# Patient Record
Sex: FEMALE | Race: WHITE | NOT HISPANIC OR LATINO | Employment: FULL TIME | URBAN - METROPOLITAN AREA
[De-identification: names, ages, dates, MRNs, and addresses within clinical notes are randomized per-mention and may not be internally consistent; named-entity substitution may affect disease eponyms.]

---

## 2017-06-01 ENCOUNTER — TRANSCRIBE ORDERS (OUTPATIENT)
Dept: MAMMOGRAPHY | Facility: HOSPITAL | Age: 47
End: 2017-06-01

## 2017-06-01 DIAGNOSIS — Z12.31 VISIT FOR SCREENING MAMMOGRAM: Primary | ICD-10-CM

## 2017-06-12 ENCOUNTER — HOSPITAL ENCOUNTER (OUTPATIENT)
Dept: MAMMOGRAPHY | Facility: HOSPITAL | Age: 47
Discharge: HOME | End: 2017-06-12
Attending: FAMILY MEDICINE | Admitting: FAMILY MEDICINE

## 2017-06-12 DIAGNOSIS — Z12.31 VISIT FOR SCREENING MAMMOGRAM: ICD-10-CM

## 2017-06-12 PROCEDURE — G0202 SCR MAMMO BI INCL CAD: HCPCS

## 2017-06-12 PROCEDURE — 77063 BREAST TOMOSYNTHESIS BI: CPT | Performed by: RADIOLOGY

## 2017-06-12 PROCEDURE — 77063 BREAST TOMOSYNTHESIS BI: CPT

## 2017-06-12 PROCEDURE — 77067 SCR MAMMO BI INCL CAD: CPT | Performed by: RADIOLOGY

## 2018-05-17 ENCOUNTER — TRANSCRIBE ORDERS (OUTPATIENT)
Dept: ADMINISTRATIVE | Facility: HOSPITAL | Age: 48
End: 2018-05-17

## 2018-05-17 ENCOUNTER — HOSPITAL ENCOUNTER (OUTPATIENT)
Dept: CT IMAGING | Facility: HOSPITAL | Age: 48
Discharge: HOME | End: 2018-05-17
Admitting: NURSE PRACTITIONER

## 2018-05-17 DIAGNOSIS — R10.30 LOWER ABDOMINAL PAIN: ICD-10-CM

## 2018-05-17 DIAGNOSIS — R10.30 LOWER ABDOMINAL PAIN: Primary | ICD-10-CM

## 2018-05-17 PROCEDURE — 74176 CT ABD & PELVIS W/O CONTRAST: CPT

## 2018-05-17 PROCEDURE — 0 DIATRIZOATE MEGLUMINE & SODIUM PER 1 ML: Performed by: NURSE PRACTITIONER

## 2018-05-17 RX ADMIN — DIATRIZOATE MEGLUMINE AND DIATRIZOATE SODIUM 15 ML: 660; 100 LIQUID ORAL; RECTAL at 11:50

## 2018-08-13 ENCOUNTER — TRANSCRIBE ORDERS (OUTPATIENT)
Dept: ADMINISTRATIVE | Facility: HOSPITAL | Age: 48
End: 2018-08-13

## 2018-08-13 DIAGNOSIS — Z12.31 VISIT FOR SCREENING MAMMOGRAM: Primary | ICD-10-CM

## 2018-08-14 ENCOUNTER — HOSPITAL ENCOUNTER (OUTPATIENT)
Dept: MAMMOGRAPHY | Facility: HOSPITAL | Age: 48
Discharge: HOME | End: 2018-08-14
Attending: FAMILY MEDICINE | Admitting: FAMILY MEDICINE

## 2018-08-14 DIAGNOSIS — Z12.31 VISIT FOR SCREENING MAMMOGRAM: ICD-10-CM

## 2018-08-14 PROCEDURE — 77063 BREAST TOMOSYNTHESIS BI: CPT | Performed by: RADIOLOGY

## 2018-08-14 PROCEDURE — 77063 BREAST TOMOSYNTHESIS BI: CPT

## 2018-08-14 PROCEDURE — 77067 SCR MAMMO BI INCL CAD: CPT

## 2018-08-14 PROCEDURE — 77067 SCR MAMMO BI INCL CAD: CPT | Performed by: RADIOLOGY

## 2019-05-31 ENCOUNTER — TRANSCRIBE ORDERS (OUTPATIENT)
Dept: ADMINISTRATIVE | Facility: HOSPITAL | Age: 49
End: 2019-05-31

## 2019-05-31 ENCOUNTER — HOSPITAL ENCOUNTER (OUTPATIENT)
Dept: CT IMAGING | Facility: HOSPITAL | Age: 49
Discharge: HOME | End: 2019-05-31
Admitting: NURSE PRACTITIONER

## 2019-05-31 DIAGNOSIS — R10.30 LOWER ABDOMINAL PAIN: ICD-10-CM

## 2019-05-31 DIAGNOSIS — R10.30 LOWER ABDOMINAL PAIN: Primary | ICD-10-CM

## 2019-05-31 PROCEDURE — 74176 CT ABD & PELVIS W/O CONTRAST: CPT

## 2019-12-16 ENCOUNTER — TRANSCRIBE ORDERS (OUTPATIENT)
Dept: ADMINISTRATIVE | Facility: HOSPITAL | Age: 49
End: 2019-12-16

## 2019-12-16 DIAGNOSIS — Z12.31 VISIT FOR SCREENING MAMMOGRAM: Primary | ICD-10-CM

## 2019-12-17 ENCOUNTER — HOSPITAL ENCOUNTER (OUTPATIENT)
Dept: MAMMOGRAPHY | Facility: HOSPITAL | Age: 49
Discharge: HOME | End: 2019-12-17
Admitting: FAMILY MEDICINE

## 2019-12-17 DIAGNOSIS — Z12.31 VISIT FOR SCREENING MAMMOGRAM: ICD-10-CM

## 2019-12-17 PROCEDURE — 77063 BREAST TOMOSYNTHESIS BI: CPT | Performed by: RADIOLOGY

## 2019-12-17 PROCEDURE — 77067 SCR MAMMO BI INCL CAD: CPT

## 2019-12-17 PROCEDURE — 77067 SCR MAMMO BI INCL CAD: CPT | Performed by: RADIOLOGY

## 2019-12-17 PROCEDURE — 77063 BREAST TOMOSYNTHESIS BI: CPT

## 2020-11-25 ENCOUNTER — TRANSCRIBE ORDERS (OUTPATIENT)
Dept: ADMINISTRATIVE | Facility: HOSPITAL | Age: 50
End: 2020-11-25

## 2020-11-25 ENCOUNTER — HOSPITAL ENCOUNTER (OUTPATIENT)
Dept: GENERAL RADIOLOGY | Facility: HOSPITAL | Age: 50
Discharge: HOME | End: 2020-11-25
Admitting: NURSE PRACTITIONER

## 2020-11-25 DIAGNOSIS — R10.12 LUQ PAIN: Primary | ICD-10-CM

## 2020-11-25 PROCEDURE — 74019 RADEX ABDOMEN 2 VIEWS: CPT

## 2021-01-19 ENCOUNTER — TRANSCRIBE ORDERS (OUTPATIENT)
Dept: ADMINISTRATIVE | Facility: HOSPITAL | Age: 51
End: 2021-01-19

## 2021-01-19 DIAGNOSIS — Z12.31 VISIT FOR SCREENING MAMMOGRAM: Primary | ICD-10-CM

## 2021-03-20 ENCOUNTER — APPOINTMENT (OUTPATIENT)
Dept: MAMMOGRAPHY | Facility: HOSPITAL | Age: 51
End: 2021-03-20

## 2021-05-01 ENCOUNTER — HOSPITAL ENCOUNTER (OUTPATIENT)
Dept: MAMMOGRAPHY | Facility: HOSPITAL | Age: 51
Discharge: HOME | End: 2021-05-01
Admitting: FAMILY MEDICINE

## 2021-05-01 DIAGNOSIS — Z12.31 VISIT FOR SCREENING MAMMOGRAM: ICD-10-CM

## 2021-05-01 PROCEDURE — 77067 SCR MAMMO BI INCL CAD: CPT

## 2021-05-01 PROCEDURE — 77063 BREAST TOMOSYNTHESIS BI: CPT

## 2021-05-01 PROCEDURE — 77067 SCR MAMMO BI INCL CAD: CPT | Performed by: RADIOLOGY

## 2021-05-01 PROCEDURE — 77063 BREAST TOMOSYNTHESIS BI: CPT | Performed by: RADIOLOGY

## 2021-08-10 ENCOUNTER — TRANSCRIBE ORDERS (OUTPATIENT)
Dept: GENERAL RADIOLOGY | Facility: HOSPITAL | Age: 51
End: 2021-08-10

## 2021-08-10 ENCOUNTER — HOSPITAL ENCOUNTER (OUTPATIENT)
Dept: GENERAL RADIOLOGY | Facility: HOSPITAL | Age: 51
Discharge: HOME | End: 2021-08-10
Admitting: PHYSICIAN ASSISTANT

## 2021-08-10 DIAGNOSIS — M54.50 LOW BACK PAIN, UNSPECIFIED BACK PAIN LATERALITY, UNSPECIFIED CHRONICITY, UNSPECIFIED WHETHER SCIATICA PRESENT: ICD-10-CM

## 2021-08-10 DIAGNOSIS — M54.50 LOW BACK PAIN, UNSPECIFIED BACK PAIN LATERALITY, UNSPECIFIED CHRONICITY, UNSPECIFIED WHETHER SCIATICA PRESENT: Primary | ICD-10-CM

## 2021-08-10 PROCEDURE — 72202 X-RAY EXAM SI JOINTS 3/> VWS: CPT

## 2021-09-17 ENCOUNTER — TRANSCRIBE ORDERS (OUTPATIENT)
Dept: ADMINISTRATIVE | Facility: HOSPITAL | Age: 51
End: 2021-09-17

## 2021-09-17 DIAGNOSIS — M15.0 PRIMARY GENERALIZED (OSTEO)ARTHRITIS: ICD-10-CM

## 2021-09-17 DIAGNOSIS — M06.09 RHEUMATOID ARTHRITIS OF MULTIPLE SITES WITHOUT RHEUMATOID FACTOR (HCC): ICD-10-CM

## 2021-09-17 DIAGNOSIS — M54.50 LOW BACK PAIN, UNSPECIFIED BACK PAIN LATERALITY, UNSPECIFIED CHRONICITY, UNSPECIFIED WHETHER SCIATICA PRESENT: Primary | ICD-10-CM

## 2022-03-11 ENCOUNTER — LAB (OUTPATIENT)
Dept: LAB | Facility: HOSPITAL | Age: 52
End: 2022-03-11

## 2022-03-11 ENCOUNTER — TRANSCRIBE ORDERS (OUTPATIENT)
Dept: LAB | Facility: HOSPITAL | Age: 52
End: 2022-03-11

## 2022-03-11 DIAGNOSIS — M05.79 SEROPOSITIVE RHEUMATOID ARTHRITIS OF MULTIPLE SITES: ICD-10-CM

## 2022-03-11 DIAGNOSIS — M05.79 SEROPOSITIVE RHEUMATOID ARTHRITIS OF MULTIPLE SITES: Primary | ICD-10-CM

## 2022-03-11 LAB
BASOPHILS # BLD AUTO: 0.04 10*3/MM3 (ref 0–0.2)
BASOPHILS NFR BLD AUTO: 0.5 % (ref 0–1.5)
DEPRECATED RDW RBC AUTO: 41.2 FL (ref 37–54)
EOSINOPHIL # BLD AUTO: 0.09 10*3/MM3 (ref 0–0.4)
EOSINOPHIL NFR BLD AUTO: 1.2 % (ref 0.3–6.2)
ERYTHROCYTE [DISTWIDTH] IN BLOOD BY AUTOMATED COUNT: 12.8 % (ref 12.3–15.4)
ERYTHROCYTE [SEDIMENTATION RATE] IN BLOOD: 15 MM/HR (ref 0–20)
HCT VFR BLD AUTO: 47.9 % (ref 34–46.6)
HGB BLD-MCNC: 16 G/DL (ref 12–15.9)
IMM GRANULOCYTES # BLD AUTO: 0.02 10*3/MM3 (ref 0–0.05)
IMM GRANULOCYTES NFR BLD AUTO: 0.3 % (ref 0–0.5)
LYMPHOCYTES # BLD AUTO: 2.05 10*3/MM3 (ref 0.7–3.1)
LYMPHOCYTES NFR BLD AUTO: 27.2 % (ref 19.6–45.3)
MCH RBC QN AUTO: 29.6 PG (ref 26.6–33)
MCHC RBC AUTO-ENTMCNC: 33.4 G/DL (ref 31.5–35.7)
MCV RBC AUTO: 88.5 FL (ref 79–97)
MONOCYTES # BLD AUTO: 0.48 10*3/MM3 (ref 0.1–0.9)
MONOCYTES NFR BLD AUTO: 6.4 % (ref 5–12)
NEUTROPHILS NFR BLD AUTO: 4.87 10*3/MM3 (ref 1.7–7)
NEUTROPHILS NFR BLD AUTO: 64.4 % (ref 42.7–76)
NRBC BLD AUTO-RTO: 0 /100 WBC (ref 0–0.2)
PLATELET # BLD AUTO: 302 10*3/MM3 (ref 140–450)
PMV BLD AUTO: 10.7 FL (ref 6–12)
RBC # BLD AUTO: 5.41 10*6/MM3 (ref 3.77–5.28)
WBC NRBC COR # BLD: 7.55 10*3/MM3 (ref 3.4–10.8)

## 2022-03-11 PROCEDURE — 80053 COMPREHEN METABOLIC PANEL: CPT

## 2022-03-11 PROCEDURE — 86480 TB TEST CELL IMMUN MEASURE: CPT

## 2022-03-11 PROCEDURE — 36415 COLL VENOUS BLD VENIPUNCTURE: CPT

## 2022-03-11 PROCEDURE — 85652 RBC SED RATE AUTOMATED: CPT

## 2022-03-11 PROCEDURE — 86140 C-REACTIVE PROTEIN: CPT

## 2022-03-11 PROCEDURE — 85025 COMPLETE CBC W/AUTO DIFF WBC: CPT

## 2022-03-12 LAB
ALBUMIN SERPL-MCNC: 4.5 G/DL (ref 3.5–5.2)
ALBUMIN/GLOB SERPL: 1.7 G/DL
ALP SERPL-CCNC: 77 U/L (ref 39–117)
ALT SERPL W P-5'-P-CCNC: 48 U/L (ref 1–33)
ANION GAP SERPL CALCULATED.3IONS-SCNC: 13.1 MMOL/L (ref 5–15)
AST SERPL-CCNC: 33 U/L (ref 1–32)
BILIRUB SERPL-MCNC: 0.3 MG/DL (ref 0–1.2)
BUN SERPL-MCNC: 11 MG/DL (ref 6–20)
BUN/CREAT SERPL: 12 (ref 7–25)
CALCIUM SPEC-SCNC: 9.2 MG/DL (ref 8.6–10.5)
CHLORIDE SERPL-SCNC: 97 MMOL/L (ref 98–107)
CO2 SERPL-SCNC: 28.9 MMOL/L (ref 22–29)
CREAT SERPL-MCNC: 0.92 MG/DL (ref 0.57–1)
CRP SERPL-MCNC: <0.3 MG/DL (ref 0–0.5)
EGFRCR SERPLBLD CKD-EPI 2021: 76.5 ML/MIN/1.73
GLOBULIN UR ELPH-MCNC: 2.7 GM/DL
GLUCOSE SERPL-MCNC: 92 MG/DL (ref 65–99)
POTASSIUM SERPL-SCNC: 3.2 MMOL/L (ref 3.5–5.2)
PROT SERPL-MCNC: 7.2 G/DL (ref 6–8.5)
SODIUM SERPL-SCNC: 139 MMOL/L (ref 136–145)

## 2022-03-15 LAB
GAMMA INTERFERON BACKGROUND BLD IA-ACNC: 0.19 IU/ML
M TB IFN-G BLD-IMP: NEGATIVE
M TB IFN-G CD4+ BCKGRND COR BLD-ACNC: 0.16 IU/ML
M TB IFN-G CD4+CD8+ BCKGRND COR BLD-ACNC: 0.29 IU/ML
MITOGEN IGNF BLD-ACNC: >10 IU/ML
SERVICE CMNT-IMP: NORMAL

## 2022-07-11 ENCOUNTER — TRANSCRIBE ORDERS (OUTPATIENT)
Dept: ADMINISTRATIVE | Facility: HOSPITAL | Age: 52
End: 2022-07-11

## 2022-07-11 DIAGNOSIS — Z12.31 VISIT FOR SCREENING MAMMOGRAM: Primary | ICD-10-CM

## 2022-07-19 ENCOUNTER — APPOINTMENT (OUTPATIENT)
Dept: MAMMOGRAPHY | Facility: HOSPITAL | Age: 52
End: 2022-07-19

## 2022-08-10 ENCOUNTER — HOSPITAL ENCOUNTER (OUTPATIENT)
Dept: MAMMOGRAPHY | Facility: HOSPITAL | Age: 52
Discharge: HOME | End: 2022-08-10
Admitting: FAMILY MEDICINE

## 2022-08-10 DIAGNOSIS — Z12.31 VISIT FOR SCREENING MAMMOGRAM: ICD-10-CM

## 2022-08-10 PROCEDURE — 77067 SCR MAMMO BI INCL CAD: CPT

## 2022-08-10 PROCEDURE — 77063 BREAST TOMOSYNTHESIS BI: CPT | Performed by: RADIOLOGY

## 2022-08-10 PROCEDURE — 77063 BREAST TOMOSYNTHESIS BI: CPT

## 2022-08-10 PROCEDURE — 77067 SCR MAMMO BI INCL CAD: CPT | Performed by: RADIOLOGY

## 2022-10-16 ENCOUNTER — HOSPITAL ENCOUNTER (EMERGENCY)
Facility: HOSPITAL | Age: 52
Discharge: HOME | End: 2022-10-16
Attending: EMERGENCY MEDICINE | Admitting: EMERGENCY MEDICINE

## 2022-10-16 ENCOUNTER — APPOINTMENT (OUTPATIENT)
Dept: CT IMAGING | Facility: HOSPITAL | Age: 52
End: 2022-10-16

## 2022-10-16 VITALS
HEIGHT: 66 IN | OXYGEN SATURATION: 98 % | WEIGHT: 198 LBS | RESPIRATION RATE: 18 BRPM | DIASTOLIC BLOOD PRESSURE: 85 MMHG | HEART RATE: 94 BPM | SYSTOLIC BLOOD PRESSURE: 123 MMHG | BODY MASS INDEX: 31.82 KG/M2 | TEMPERATURE: 97.8 F

## 2022-10-16 DIAGNOSIS — K57.92 ACUTE DIVERTICULITIS: Primary | ICD-10-CM

## 2022-10-16 LAB
ALBUMIN SERPL-MCNC: 4.3 G/DL (ref 3.5–5.2)
ALBUMIN/GLOB SERPL: 1.5 G/DL
ALP SERPL-CCNC: 86 U/L (ref 39–117)
ALT SERPL W P-5'-P-CCNC: 57 U/L (ref 1–33)
ANION GAP SERPL CALCULATED.3IONS-SCNC: 11 MMOL/L (ref 5–15)
AST SERPL-CCNC: 33 U/L (ref 1–32)
BASOPHILS # BLD AUTO: 0.02 10*3/MM3 (ref 0–0.2)
BASOPHILS NFR BLD AUTO: 0.2 % (ref 0–1.5)
BILIRUB SERPL-MCNC: 0.7 MG/DL (ref 0–1.2)
BILIRUB UR QL STRIP: NEGATIVE
BUN SERPL-MCNC: 13 MG/DL (ref 6–20)
BUN/CREAT SERPL: 16.9 (ref 7–25)
CALCIUM SPEC-SCNC: 9.3 MG/DL (ref 8.6–10.5)
CHLORIDE SERPL-SCNC: 101 MMOL/L (ref 98–107)
CLARITY UR: CLEAR
CO2 SERPL-SCNC: 27 MMOL/L (ref 22–29)
COLOR UR: YELLOW
CREAT SERPL-MCNC: 0.77 MG/DL (ref 0.57–1)
D-LACTATE SERPL-SCNC: 1.4 MMOL/L (ref 0.5–2)
DEPRECATED RDW RBC AUTO: 43 FL (ref 37–54)
EGFRCR SERPLBLD CKD-EPI 2021: 94.1 ML/MIN/1.73
EOSINOPHIL # BLD AUTO: 0.11 10*3/MM3 (ref 0–0.4)
EOSINOPHIL NFR BLD AUTO: 1.2 % (ref 0.3–6.2)
ERYTHROCYTE [DISTWIDTH] IN BLOOD BY AUTOMATED COUNT: 12.6 % (ref 12.3–15.4)
GLOBULIN UR ELPH-MCNC: 2.8 GM/DL
GLUCOSE SERPL-MCNC: 123 MG/DL (ref 65–99)
GLUCOSE UR STRIP-MCNC: NEGATIVE MG/DL
HCT VFR BLD AUTO: 47.9 % (ref 34–46.6)
HGB BLD-MCNC: 15.8 G/DL (ref 12–15.9)
HGB UR QL STRIP.AUTO: NEGATIVE
HOLD SPECIMEN: NORMAL
IMM GRANULOCYTES # BLD AUTO: 0.02 10*3/MM3 (ref 0–0.05)
IMM GRANULOCYTES NFR BLD AUTO: 0.2 % (ref 0–0.5)
KETONES UR QL STRIP: NEGATIVE
LEUKOCYTE ESTERASE UR QL STRIP.AUTO: NEGATIVE
LIPASE SERPL-CCNC: 16 U/L (ref 13–60)
LYMPHOCYTES # BLD AUTO: 1.72 10*3/MM3 (ref 0.7–3.1)
LYMPHOCYTES NFR BLD AUTO: 19.4 % (ref 19.6–45.3)
MCH RBC QN AUTO: 30.4 PG (ref 26.6–33)
MCHC RBC AUTO-ENTMCNC: 33 G/DL (ref 31.5–35.7)
MCV RBC AUTO: 92.1 FL (ref 79–97)
MONOCYTES # BLD AUTO: 0.73 10*3/MM3 (ref 0.1–0.9)
MONOCYTES NFR BLD AUTO: 8.2 % (ref 5–12)
NEUTROPHILS NFR BLD AUTO: 6.28 10*3/MM3 (ref 1.7–7)
NEUTROPHILS NFR BLD AUTO: 70.8 % (ref 42.7–76)
NITRITE UR QL STRIP: NEGATIVE
NRBC BLD AUTO-RTO: 0 /100 WBC (ref 0–0.2)
PH UR STRIP.AUTO: 6 [PH] (ref 5–8)
PLATELET # BLD AUTO: 279 10*3/MM3 (ref 140–450)
PMV BLD AUTO: 10 FL (ref 6–12)
POTASSIUM SERPL-SCNC: 3.7 MMOL/L (ref 3.5–5.2)
PROT SERPL-MCNC: 7.1 G/DL (ref 6–8.5)
PROT UR QL STRIP: NEGATIVE
RBC # BLD AUTO: 5.2 10*6/MM3 (ref 3.77–5.28)
SODIUM SERPL-SCNC: 139 MMOL/L (ref 136–145)
SP GR UR STRIP: 1.01 (ref 1–1.03)
UROBILINOGEN UR QL STRIP: NORMAL
WBC NRBC COR # BLD: 8.88 10*3/MM3 (ref 3.4–10.8)
WHOLE BLOOD HOLD COAG: NORMAL
WHOLE BLOOD HOLD SPECIMEN: NORMAL

## 2022-10-16 PROCEDURE — 25010000002 ONDANSETRON PER 1 MG: Performed by: EMERGENCY MEDICINE

## 2022-10-16 PROCEDURE — 25010000002 IOPAMIDOL 61 % SOLUTION: Performed by: EMERGENCY MEDICINE

## 2022-10-16 PROCEDURE — 25010000002 LEVOFLOXACIN PER 250 MG: Performed by: NURSE PRACTITIONER

## 2022-10-16 PROCEDURE — 96365 THER/PROPH/DIAG IV INF INIT: CPT

## 2022-10-16 PROCEDURE — 96376 TX/PRO/DX INJ SAME DRUG ADON: CPT

## 2022-10-16 PROCEDURE — 74177 CT ABD & PELVIS W/CONTRAST: CPT

## 2022-10-16 PROCEDURE — 81003 URINALYSIS AUTO W/O SCOPE: CPT

## 2022-10-16 PROCEDURE — 83690 ASSAY OF LIPASE: CPT

## 2022-10-16 PROCEDURE — 85025 COMPLETE CBC W/AUTO DIFF WBC: CPT

## 2022-10-16 PROCEDURE — 96375 TX/PRO/DX INJ NEW DRUG ADDON: CPT

## 2022-10-16 PROCEDURE — 99284 EMERGENCY DEPT VISIT MOD MDM: CPT

## 2022-10-16 PROCEDURE — 36415 COLL VENOUS BLD VENIPUNCTURE: CPT

## 2022-10-16 PROCEDURE — 83605 ASSAY OF LACTIC ACID: CPT

## 2022-10-16 PROCEDURE — 25010000002 HYDROMORPHONE PER 4 MG: Performed by: EMERGENCY MEDICINE

## 2022-10-16 PROCEDURE — 80053 COMPREHEN METABOLIC PANEL: CPT

## 2022-10-16 RX ORDER — LEVOFLOXACIN 5 MG/ML
750 INJECTION, SOLUTION INTRAVENOUS ONCE
Status: COMPLETED | OUTPATIENT
Start: 2022-10-16 | End: 2022-10-16

## 2022-10-16 RX ORDER — ONDANSETRON 2 MG/ML
4 INJECTION INTRAMUSCULAR; INTRAVENOUS ONCE
Status: COMPLETED | OUTPATIENT
Start: 2022-10-16 | End: 2022-10-16

## 2022-10-16 RX ORDER — HYDROMORPHONE HYDROCHLORIDE 1 MG/ML
0.5 INJECTION, SOLUTION INTRAMUSCULAR; INTRAVENOUS; SUBCUTANEOUS ONCE
Status: COMPLETED | OUTPATIENT
Start: 2022-10-16 | End: 2022-10-16

## 2022-10-16 RX ORDER — METRONIDAZOLE 500 MG/1
500 TABLET ORAL 2 TIMES DAILY
Qty: 14 TABLET | Refills: 0 | Status: ON HOLD | OUTPATIENT
Start: 2022-10-16 | End: 2023-02-17

## 2022-10-16 RX ORDER — SODIUM CHLORIDE 0.9 % (FLUSH) 0.9 %
10 SYRINGE (ML) INJECTION AS NEEDED
Status: DISCONTINUED | OUTPATIENT
Start: 2022-10-16 | End: 2022-10-16 | Stop reason: HOSPADM

## 2022-10-16 RX ORDER — LEVOFLOXACIN 750 MG/1
750 TABLET ORAL DAILY
Qty: 9 TABLET | Refills: 0 | Status: ON HOLD | OUTPATIENT
Start: 2022-10-16 | End: 2023-02-17

## 2022-10-16 RX ORDER — ONDANSETRON 4 MG/1
4 TABLET, ORALLY DISINTEGRATING ORAL EVERY 6 HOURS PRN
Qty: 20 TABLET | Refills: 0 | Status: ON HOLD | OUTPATIENT
Start: 2022-10-16 | End: 2023-04-03 | Stop reason: SDUPTHER

## 2022-10-16 RX ORDER — SODIUM CHLORIDE 9 MG/ML
10 INJECTION INTRAVENOUS AS NEEDED
Status: DISCONTINUED | OUTPATIENT
Start: 2022-10-16 | End: 2022-10-16 | Stop reason: HOSPADM

## 2022-10-16 RX ADMIN — IOPAMIDOL 95 ML: 612 INJECTION, SOLUTION INTRAVENOUS at 15:29

## 2022-10-16 RX ADMIN — SODIUM CHLORIDE 1000 ML: 9 INJECTION, SOLUTION INTRAVENOUS at 15:08

## 2022-10-16 RX ADMIN — LEVOFLOXACIN 750 MG: 750 INJECTION, SOLUTION INTRAVENOUS at 16:37

## 2022-10-16 RX ADMIN — ONDANSETRON 4 MG: 2 INJECTION INTRAMUSCULAR; INTRAVENOUS at 16:37

## 2022-10-16 RX ADMIN — HYDROMORPHONE HYDROCHLORIDE 0.5 MG: 1 INJECTION, SOLUTION INTRAMUSCULAR; INTRAVENOUS; SUBCUTANEOUS at 16:37

## 2022-10-16 RX ADMIN — HYDROMORPHONE HYDROCHLORIDE 0.5 MG: 1 INJECTION, SOLUTION INTRAMUSCULAR; INTRAVENOUS; SUBCUTANEOUS at 15:08

## 2022-10-16 RX ADMIN — ONDANSETRON 4 MG: 2 INJECTION INTRAMUSCULAR; INTRAVENOUS at 15:08

## 2022-10-16 NOTE — DISCHARGE INSTRUCTIONS
Home to rest.  Maintain your very best hydration and nutrition.  Your next dose of Levaquin is due tomorrow and once daily for 9 days.  Begin taking the Flagyl twice daily this evening.  Do not drink any alcohol while consuming Flagyl.  Ibuprofen for discomfort.  Return to the emergency department as needed for worsening symptoms or concerns including but not limited to: Fever, intractable pain, intractable vomiting.  Thank you

## 2022-10-16 NOTE — ED PROVIDER NOTES
EMERGENCY DEPARTMENT ENCOUNTER    Pt Name: Heidy Whitman  MRN: [id number]  Pt :   1970  Room Number:    Date of encounter:  10/16/2022  PCP: Itzel Long MD  ED Provider: RORO Bowles    Historian: Patient      HPI:  Chief Complaint: Abdominal pain        Context: Heidy Whitman is a 52 y.o. female who presents to the ED c/o abdominal pain in the left lower quadrant that she recognizes is similar presentation in intensity to previous occasions of diverticulitis.  She rates her pain as 8 out of 10.  It has been hurting for approximately 3 days now.  She originally thought she may be constipated but after use of laxatives and a series of bowel movements yesterday, her pain has not relieved but only worsened.  She has had no blood from the rectum.    Review of system is negative for fever positive for chills.  Negative for chest pain or cough or shortness of breath.  Positive for nausea without vomiting.  She denies any diarrhea.  Positive for some constipation no blood from the rectum.  Positive for generalized weakness without dizziness or syncope.  No neurosensory complaint or focal weakness      PAST MEDICAL HISTORY  Past Medical History:   Diagnosis Date   • Arthritis    • Breast injury     2022-right breast hit with car door   • Disease of thyroid gland    • Diverticulitis    • Edema    • Hyperlipidemia    • Hypertension    • Rheumatoid arthritis (HCC)          PAST SURGICAL HISTORY  Past Surgical History:   Procedure Laterality Date   • ABDOMINAL SURGERY     • HYSTERECTOMY      age 30   • OOPHORECTOMY     • THYROID SURGERY           FAMILY HISTORY  Family History   Problem Relation Age of Onset   • Breast cancer Paternal Grandmother         age unknown   • Ovarian cancer Neg Hx          SOCIAL HISTORY  Social History     Socioeconomic History   • Marital status:    Tobacco Use   • Smoking status: Never   Substance and Sexual Activity   • Alcohol use: Yes     Comment:  occasional use   • Drug use: No   • Sexual activity: Defer         ALLERGIES  Guaifenesin & derivatives, Gabapentin, and Pregabalin        REVIEW OF SYSTEMS  Review of Systems     All systems reviewed and negative except for those discussed in HPI.       PHYSICAL EXAM    I have reviewed the triage vital signs and nursing notes.    ED Triage Vitals [10/16/22 1159]   Temp Heart Rate Resp BP SpO2   97.8 °F (36.6 °C) 98 18 107/81 98 %      Temp src Heart Rate Source Patient Position BP Location FiO2 (%)   Oral Monitor Sitting Left arm --       Physical Exam  GENERAL:   Appears uncomfortable.  She is slightly tachycardic.  She is a good historian  HENT: Nares patent.  EYES: No scleral icterus.  CV: Regular rhythm, regular rate.  Heart rate in the 90s.  RESPIRATORY: Normal effort.  No audible wheezes, rales or rhonchi.  ABDOMEN: Soft, localizes pain to the left lower quadrant moderate without guarding.  No CVA tenderness  MUSCULOSKELETAL: No deformities.   NEURO: Alert, moves all extremities, follows commands.  SKIN: Warm, dry, no rash visualized.        LAB RESULTS  No results found for this or any previous visit (from the past 24 hour(s)).    If labs were ordered, I independently reviewed the results.        RADIOLOGY  No Radiology Exams Resulted Within Past 24 Hours      PROCEDURES    Procedures    No orders to display       MEDICATIONS GIVEN IN ER    Medications   sodium chloride 0.9 % bolus 1,000 mL (0 mL Intravenous Stopped 10/16/22 1606)   HYDROmorphone (DILAUDID) injection 0.5 mg (0.5 mg Intravenous Given 10/16/22 1508)   ondansetron (ZOFRAN) injection 4 mg (4 mg Intravenous Given 10/16/22 1508)   iopamidol (ISOVUE-300) 61 % injection 95 mL (95 mL Intravenous Given 10/16/22 1529)   levoFLOXacin (LEVAQUIN) 750 mg/150 mL D5W (premix) (LEVAQUIN) 750 mg (0 mg Intravenous Stopped 10/16/22 1825)   ondansetron (ZOFRAN) injection 4 mg (4 mg Intravenous Given 10/16/22 1637)   HYDROmorphone (DILAUDID) injection 0.5 mg (0.5  mg Intravenous Given 10/16/22 1637)         ED Course as of 10/21/22 2000   Sun Oct 16, 2022   1459 WBC: 8.88 [MS]   1624 Patient's CT imaging is remarkable for acute diverticulitis.  Meanwhile, she has a normal white count and normal lactic acid.  She has no hypotension or tachycardia.  She has been made comfortable.  She is sure she is able to take and tolerate fluids.  She historically cannot tolerate oral pain medications due to profound nausea.  She is confident that she can manage her pain at home with ibuprofen and the antibiotics.  In her experience, initiation of antibiotics almost immediately starts to relieve her associated discomfort.  We discussed in detail parameters for concern that would warrant return to the emergency department.  Ms. Whitman understands and concurs with this outpatient plan and close follow-up [MS]      ED Course User Index  [MS] Sharonda PulliamRORO           AS OF 20:00 EDT VITALS:    BP - 123/85  HR - 94  TEMP - 97.8 °F (36.6 °C) (Oral)  O2 SATS - 98%                  DIAGNOSIS  Final diagnoses:   Acute diverticulitis         DISPOSITION    DISCHARGE    Patient discharged in stable condition.    Reviewed implications of results, diagnosis, meds, responsibility to follow up, warning signs and symptoms of possible worsening, potential complications and reasons to return to ER.    Patient/Family voiced understanding of above instructions.    Discussed plan for discharge, as there is no emergent indication for admission.  Pt/family is agreeable and understands need for follow up and possible repeat testing.  Pt/family is aware that discharge does not mean that nothing is wrong but that it indicates no emergency is currently present that requires admission and they must continue care with follow-up as given below or with a physician of their choice.     FOLLOW-UP  Amaury Little MD  [address] [place] [address] [phone]               Medication List      New  Prescriptions    levoFLOXacin 750 MG tablet  Commonly known as: LEVAQUIN  Take 1 tablet by mouth Daily.     metroNIDAZOLE 500 MG tablet  Commonly known as: FLAGYL  Take 1 tablet by mouth 2 (Two) Times a Day.     ondansetron ODT 4 MG disintegrating tablet  Commonly known as: ZOFRAN-ODT  Place 1 tablet on the tongue Every 6 (Six) Hours As Needed for Nausea.           Where to Get Your Medications      These medications were sent to [facility] [address] [place] [address] [address] [place] [address] [phone]  - [phone] [place] [address]    Phone: [phone]   · levoFLOXacin 750 MG tablet  · metroNIDAZOLE 500 MG tablet  · ondansetron ODT 4 MG disintegrating tablet                  Sharonda Pulliam, RORO  10/21/22 2000

## 2023-02-06 ENCOUNTER — TRANSCRIBE ORDERS (OUTPATIENT)
Dept: ADMINISTRATIVE | Facility: HOSPITAL | Age: 53
End: 2023-02-06
Payer: COMMERCIAL

## 2023-02-06 DIAGNOSIS — R94.31 NONSPECIFIC ABNORMAL ELECTROCARDIOGRAM (ECG) (EKG): Primary | ICD-10-CM

## 2023-02-06 DIAGNOSIS — R10.30 LOWER ABDOMINAL PAIN: Primary | ICD-10-CM

## 2023-02-16 ENCOUNTER — APPOINTMENT (OUTPATIENT)
Dept: CT IMAGING | Facility: HOSPITAL | Age: 53
End: 2023-02-16
Payer: COMMERCIAL

## 2023-02-16 ENCOUNTER — HOSPITAL ENCOUNTER (OUTPATIENT)
Facility: HOSPITAL | Age: 53
Setting detail: OBSERVATION
Discharge: HOME | End: 2023-02-21
Attending: EMERGENCY MEDICINE | Admitting: EMERGENCY MEDICINE
Payer: COMMERCIAL

## 2023-02-16 ENCOUNTER — APPOINTMENT (OUTPATIENT)
Dept: GENERAL RADIOLOGY | Facility: HOSPITAL | Age: 53
End: 2023-02-16
Payer: COMMERCIAL

## 2023-02-16 DIAGNOSIS — K57.92 ACUTE DIVERTICULITIS: Primary | ICD-10-CM

## 2023-02-16 DIAGNOSIS — M06.9 RHEUMATOID ARTHRITIS INVOLVING MULTIPLE SITES, UNSPECIFIED WHETHER RHEUMATOID FACTOR PRESENT: ICD-10-CM

## 2023-02-16 DIAGNOSIS — K57.92 DIVERTICULITIS: ICD-10-CM

## 2023-02-16 PROBLEM — E03.9 HYPOTHYROIDISM (ACQUIRED): Status: ACTIVE | Noted: 2023-02-16

## 2023-02-16 PROBLEM — Z86.19 HISTORY OF SHINGLES: Status: ACTIVE | Noted: 2017-03-23

## 2023-02-16 PROBLEM — I10 HTN (HYPERTENSION): Status: ACTIVE | Noted: 2023-02-16

## 2023-02-16 PROBLEM — Z79.52 CURRENT USE OF STEROID MEDICATION: Status: ACTIVE | Noted: 2023-02-16

## 2023-02-16 PROBLEM — R06.09 DOE (DYSPNEA ON EXERTION): Status: ACTIVE | Noted: 2023-02-16

## 2023-02-16 PROBLEM — R07.89 OTHER CHEST PAIN: Status: ACTIVE | Noted: 2023-02-16

## 2023-02-16 PROBLEM — K57.90 DIVERTICULOSIS: Status: ACTIVE | Noted: 2018-05-01

## 2023-02-16 PROBLEM — E78.5 HLD (HYPERLIPIDEMIA): Status: ACTIVE | Noted: 2023-02-16

## 2023-02-16 LAB
ALBUMIN SERPL-MCNC: 4.5 G/DL (ref 3.5–5.2)
ALBUMIN/GLOB SERPL: 1.6 G/DL
ALP SERPL-CCNC: 101 U/L (ref 39–117)
ALT SERPL W P-5'-P-CCNC: 68 U/L (ref 1–33)
ANION GAP SERPL CALCULATED.3IONS-SCNC: 12 MMOL/L (ref 5–15)
AST SERPL-CCNC: 52 U/L (ref 1–32)
BACTERIA UR QL AUTO: ABNORMAL /HPF
BASOPHILS # BLD AUTO: 0.03 10*3/MM3 (ref 0–0.2)
BASOPHILS NFR BLD AUTO: 0.4 % (ref 0–1.5)
BILIRUB SERPL-MCNC: 0.5 MG/DL (ref 0–1.2)
BILIRUB UR QL STRIP: NEGATIVE
BUN SERPL-MCNC: 13 MG/DL (ref 6–20)
BUN/CREAT SERPL: 14.3 (ref 7–25)
CALCIUM SPEC-SCNC: 9.2 MG/DL (ref 8.6–10.5)
CHLORIDE SERPL-SCNC: 102 MMOL/L (ref 98–107)
CLARITY UR: CLEAR
CO2 SERPL-SCNC: 27 MMOL/L (ref 22–29)
COLOR UR: YELLOW
CREAT BLDA-MCNC: 0.9 MG/DL
CREAT BLDA-MCNC: 0.9 MG/DL (ref 0.6–1.3)
CREAT SERPL-MCNC: 0.91 MG/DL (ref 0.57–1)
CRP SERPL-MCNC: 1.4 MG/DL (ref 0–0.5)
D-LACTATE SERPL-SCNC: 1.2 MMOL/L (ref 0.5–2)
DEPRECATED RDW RBC AUTO: 42.9 FL (ref 37–54)
EGFRCR SERPLBLD CKD-EPI 2021: 77 ML/MIN/1.73
EOSINOPHIL # BLD AUTO: 0.06 10*3/MM3 (ref 0–0.4)
EOSINOPHIL NFR BLD AUTO: 0.7 % (ref 0.3–6.2)
ERYTHROCYTE [DISTWIDTH] IN BLOOD BY AUTOMATED COUNT: 13.1 % (ref 12.3–15.4)
ERYTHROCYTE [SEDIMENTATION RATE] IN BLOOD: 36 MM/HR (ref 0–30)
GEN 5 2HR TROPONIN T REFLEX: 7 NG/L
GLOBULIN UR ELPH-MCNC: 2.8 GM/DL
GLUCOSE SERPL-MCNC: 106 MG/DL (ref 65–99)
GLUCOSE UR STRIP-MCNC: NEGATIVE MG/DL
HCT VFR BLD AUTO: 46.4 % (ref 34–46.6)
HGB BLD-MCNC: 15.4 G/DL (ref 12–15.9)
HGB UR QL STRIP.AUTO: NEGATIVE
HOLD SPECIMEN: NORMAL
HYALINE CASTS UR QL AUTO: ABNORMAL /LPF
IMM GRANULOCYTES # BLD AUTO: 0.03 10*3/MM3 (ref 0–0.05)
IMM GRANULOCYTES NFR BLD AUTO: 0.4 % (ref 0–0.5)
KETONES UR QL STRIP: ABNORMAL
LEUKOCYTE ESTERASE UR QL STRIP.AUTO: ABNORMAL
LIPASE SERPL-CCNC: 24 U/L (ref 13–60)
LYMPHOCYTES # BLD AUTO: 2.28 10*3/MM3 (ref 0.7–3.1)
LYMPHOCYTES NFR BLD AUTO: 27.7 % (ref 19.6–45.3)
MCH RBC QN AUTO: 29.9 PG (ref 26.6–33)
MCHC RBC AUTO-ENTMCNC: 33.2 G/DL (ref 31.5–35.7)
MCV RBC AUTO: 90.1 FL (ref 79–97)
MONOCYTES # BLD AUTO: 0.92 10*3/MM3 (ref 0.1–0.9)
MONOCYTES NFR BLD AUTO: 11.2 % (ref 5–12)
NEUTROPHILS NFR BLD AUTO: 4.9 10*3/MM3 (ref 1.7–7)
NEUTROPHILS NFR BLD AUTO: 59.6 % (ref 42.7–76)
NITRITE UR QL STRIP: NEGATIVE
NRBC BLD AUTO-RTO: 0 /100 WBC (ref 0–0.2)
NT-PROBNP SERPL-MCNC: 34.6 PG/ML (ref 0–900)
PH UR STRIP.AUTO: 8 [PH] (ref 5–8)
PLATELET # BLD AUTO: 306 10*3/MM3 (ref 140–450)
PMV BLD AUTO: 10.2 FL (ref 6–12)
POTASSIUM SERPL-SCNC: 4.1 MMOL/L (ref 3.5–5.2)
PROCALCITONIN SERPL-MCNC: 0.06 NG/ML (ref 0–0.25)
PROT SERPL-MCNC: 7.3 G/DL (ref 6–8.5)
PROT UR QL STRIP: ABNORMAL
RBC # BLD AUTO: 5.15 10*6/MM3 (ref 3.77–5.28)
RBC # UR STRIP: ABNORMAL /HPF
REF LAB TEST METHOD: ABNORMAL
SODIUM SERPL-SCNC: 141 MMOL/L (ref 136–145)
SP GR UR STRIP: 1.02 (ref 1–1.03)
SQUAMOUS #/AREA URNS HPF: ABNORMAL /HPF
TROPONIN T DELTA: -4 NG/L
TROPONIN T SERPL HS-MCNC: 11 NG/L
UROBILINOGEN UR QL STRIP: ABNORMAL
WBC # UR STRIP: ABNORMAL /HPF
WBC NRBC COR # BLD: 8.22 10*3/MM3 (ref 3.4–10.8)
WHOLE BLOOD HOLD COAG: NORMAL
WHOLE BLOOD HOLD SPECIMEN: NORMAL

## 2023-02-16 PROCEDURE — 85025 COMPLETE CBC W/AUTO DIFF WBC: CPT | Performed by: EMERGENCY MEDICINE

## 2023-02-16 PROCEDURE — 25010000002 ONDANSETRON PER 1 MG: Performed by: EMERGENCY MEDICINE

## 2023-02-16 PROCEDURE — 96374 THER/PROPH/DIAG INJ IV PUSH: CPT

## 2023-02-16 PROCEDURE — G0378 HOSPITAL OBSERVATION PER HR: HCPCS

## 2023-02-16 PROCEDURE — 93005 ELECTROCARDIOGRAM TRACING: CPT | Performed by: EMERGENCY MEDICINE

## 2023-02-16 PROCEDURE — 25010000002 HYDROMORPHONE PER 4 MG: Performed by: EMERGENCY MEDICINE

## 2023-02-16 PROCEDURE — 25010000002 PIPERACILLIN SOD-TAZOBACTAM PER 1 G: Performed by: EMERGENCY MEDICINE

## 2023-02-16 PROCEDURE — 93005 ELECTROCARDIOGRAM TRACING: CPT

## 2023-02-16 PROCEDURE — 84484 ASSAY OF TROPONIN QUANT: CPT | Performed by: EMERGENCY MEDICINE

## 2023-02-16 PROCEDURE — 71045 X-RAY EXAM CHEST 1 VIEW: CPT

## 2023-02-16 PROCEDURE — 87040 BLOOD CULTURE FOR BACTERIA: CPT | Performed by: EMERGENCY MEDICINE

## 2023-02-16 PROCEDURE — 80053 COMPREHEN METABOLIC PANEL: CPT | Performed by: EMERGENCY MEDICINE

## 2023-02-16 PROCEDURE — 85652 RBC SED RATE AUTOMATED: CPT | Performed by: FAMILY MEDICINE

## 2023-02-16 PROCEDURE — 96375 TX/PRO/DX INJ NEW DRUG ADDON: CPT

## 2023-02-16 PROCEDURE — 25010000002 MORPHINE PER 10 MG: Performed by: FAMILY MEDICINE

## 2023-02-16 PROCEDURE — 84145 PROCALCITONIN (PCT): CPT | Performed by: EMERGENCY MEDICINE

## 2023-02-16 PROCEDURE — 25010000002 IOPAMIDOL 61 % SOLUTION: Performed by: EMERGENCY MEDICINE

## 2023-02-16 PROCEDURE — 99285 EMERGENCY DEPT VISIT HI MDM: CPT

## 2023-02-16 PROCEDURE — 86140 C-REACTIVE PROTEIN: CPT | Performed by: FAMILY MEDICINE

## 2023-02-16 PROCEDURE — 81001 URINALYSIS AUTO W/SCOPE: CPT | Performed by: EMERGENCY MEDICINE

## 2023-02-16 PROCEDURE — 82565 ASSAY OF CREATININE: CPT

## 2023-02-16 PROCEDURE — 96365 THER/PROPH/DIAG IV INF INIT: CPT

## 2023-02-16 PROCEDURE — 83605 ASSAY OF LACTIC ACID: CPT | Performed by: EMERGENCY MEDICINE

## 2023-02-16 PROCEDURE — 63710000001 DIPHENHYDRAMINE PER 50 MG: Performed by: NURSE PRACTITIONER

## 2023-02-16 PROCEDURE — 99222 1ST HOSP IP/OBS MODERATE 55: CPT | Performed by: FAMILY MEDICINE

## 2023-02-16 PROCEDURE — 83690 ASSAY OF LIPASE: CPT | Performed by: EMERGENCY MEDICINE

## 2023-02-16 PROCEDURE — 83880 ASSAY OF NATRIURETIC PEPTIDE: CPT | Performed by: FAMILY MEDICINE

## 2023-02-16 PROCEDURE — 74177 CT ABD & PELVIS W/CONTRAST: CPT

## 2023-02-16 PROCEDURE — 36415 COLL VENOUS BLD VENIPUNCTURE: CPT

## 2023-02-16 RX ORDER — MORPHINE SULFATE 2 MG/ML
2 INJECTION, SOLUTION INTRAMUSCULAR; INTRAVENOUS
Status: DISCONTINUED | OUTPATIENT
Start: 2023-02-16 | End: 2023-02-17

## 2023-02-16 RX ORDER — SODIUM CHLORIDE 0.9 % (FLUSH) 0.9 %
10 SYRINGE (ML) INJECTION EVERY 12 HOURS SCHEDULED
Status: DISCONTINUED | OUTPATIENT
Start: 2023-02-16 | End: 2023-02-21 | Stop reason: HOSPADM

## 2023-02-16 RX ORDER — SODIUM CHLORIDE 0.9 % (FLUSH) 0.9 %
10 SYRINGE (ML) INJECTION AS NEEDED
Status: DISCONTINUED | OUTPATIENT
Start: 2023-02-16 | End: 2023-02-20

## 2023-02-16 RX ORDER — HYDROCHLOROTHIAZIDE 25 MG/1
25 TABLET ORAL
Status: DISCONTINUED | OUTPATIENT
Start: 2023-02-17 | End: 2023-02-21 | Stop reason: HOSPADM

## 2023-02-16 RX ORDER — AMITRIPTYLINE HYDROCHLORIDE 10 MG/1
10 TABLET, FILM COATED ORAL NIGHTLY
Status: DISCONTINUED | OUTPATIENT
Start: 2023-02-16 | End: 2023-02-21 | Stop reason: HOSPADM

## 2023-02-16 RX ORDER — FLUOXETINE HYDROCHLORIDE 20 MG/1
40 CAPSULE ORAL DAILY
Status: DISCONTINUED | OUTPATIENT
Start: 2023-02-17 | End: 2023-02-21 | Stop reason: HOSPADM

## 2023-02-16 RX ORDER — ATORVASTATIN CALCIUM 40 MG/1
40 TABLET, FILM COATED ORAL DAILY
Status: DISCONTINUED | OUTPATIENT
Start: 2023-02-17 | End: 2023-02-17

## 2023-02-16 RX ORDER — ONDANSETRON 2 MG/ML
4 INJECTION INTRAMUSCULAR; INTRAVENOUS EVERY 6 HOURS PRN
Status: DISCONTINUED | OUTPATIENT
Start: 2023-02-16 | End: 2023-02-21 | Stop reason: HOSPADM

## 2023-02-16 RX ORDER — LOSARTAN POTASSIUM 50 MG/1
100 TABLET ORAL
Status: DISCONTINUED | OUTPATIENT
Start: 2023-02-17 | End: 2023-02-21 | Stop reason: HOSPADM

## 2023-02-16 RX ORDER — LANOLIN ALCOHOL/MO/W.PET/CERES
1000 CREAM (GRAM) TOPICAL DAILY
Status: DISCONTINUED | OUTPATIENT
Start: 2023-02-17 | End: 2023-02-21 | Stop reason: HOSPADM

## 2023-02-16 RX ORDER — HYDROMORPHONE HYDROCHLORIDE 1 MG/ML
0.5 INJECTION, SOLUTION INTRAMUSCULAR; INTRAVENOUS; SUBCUTANEOUS ONCE
Status: COMPLETED | OUTPATIENT
Start: 2023-02-16 | End: 2023-02-16

## 2023-02-16 RX ORDER — DIPHENHYDRAMINE HCL 25 MG
25 CAPSULE ORAL ONCE
Status: DISCONTINUED | OUTPATIENT
Start: 2023-02-16 | End: 2023-02-16

## 2023-02-16 RX ORDER — METOPROLOL SUCCINATE 25 MG/1
25 TABLET, EXTENDED RELEASE ORAL DAILY
COMMUNITY
End: 2023-02-21 | Stop reason: HOSPADM

## 2023-02-16 RX ORDER — SODIUM CHLORIDE 9 MG/ML
40 INJECTION, SOLUTION INTRAVENOUS AS NEEDED
Status: DISCONTINUED | OUTPATIENT
Start: 2023-02-16 | End: 2023-02-21 | Stop reason: HOSPADM

## 2023-02-16 RX ORDER — SODIUM CHLORIDE 0.9 % (FLUSH) 0.9 %
10 SYRINGE (ML) INJECTION AS NEEDED
Status: DISCONTINUED | OUTPATIENT
Start: 2023-02-16 | End: 2023-02-21 | Stop reason: HOSPADM

## 2023-02-16 RX ORDER — TIZANIDINE 4 MG/1
4 TABLET ORAL NIGHTLY PRN
Status: DISCONTINUED | OUTPATIENT
Start: 2023-02-16 | End: 2023-02-21 | Stop reason: HOSPADM

## 2023-02-16 RX ORDER — ONDANSETRON 2 MG/ML
4 INJECTION INTRAMUSCULAR; INTRAVENOUS ONCE
Status: COMPLETED | OUTPATIENT
Start: 2023-02-16 | End: 2023-02-16

## 2023-02-16 RX ORDER — DIPHENHYDRAMINE HCL 25 MG
25 CAPSULE ORAL EVERY 6 HOURS PRN
Status: DISCONTINUED | OUTPATIENT
Start: 2023-02-16 | End: 2023-02-16

## 2023-02-16 RX ORDER — DIPHENHYDRAMINE HCL 25 MG
25 CAPSULE ORAL EVERY 6 HOURS PRN
Status: DISCONTINUED | OUTPATIENT
Start: 2023-02-16 | End: 2023-02-21 | Stop reason: HOSPADM

## 2023-02-16 RX ORDER — ONDANSETRON 4 MG/1
4 TABLET, FILM COATED ORAL EVERY 6 HOURS PRN
Status: DISCONTINUED | OUTPATIENT
Start: 2023-02-16 | End: 2023-02-21 | Stop reason: HOSPADM

## 2023-02-16 RX ORDER — AMITRIPTYLINE HYDROCHLORIDE 10 MG/1
10 TABLET, FILM COATED ORAL NIGHTLY
COMMUNITY

## 2023-02-16 RX ORDER — LEVOTHYROXINE SODIUM 137 UG/1
137 TABLET ORAL
Status: DISCONTINUED | OUTPATIENT
Start: 2023-02-17 | End: 2023-02-17

## 2023-02-16 RX ADMIN — Medication 10 ML: at 22:51

## 2023-02-16 RX ADMIN — IOPAMIDOL 100 ML: 612 INJECTION, SOLUTION INTRAVENOUS at 17:38

## 2023-02-16 RX ADMIN — AMITRIPTYLINE HYDROCHLORIDE 10 MG: 10 TABLET, FILM COATED ORAL at 22:43

## 2023-02-16 RX ADMIN — ONDANSETRON 4 MG: 2 INJECTION INTRAMUSCULAR; INTRAVENOUS at 18:59

## 2023-02-16 RX ADMIN — HYDROMORPHONE HYDROCHLORIDE 0.5 MG: 1 INJECTION, SOLUTION INTRAMUSCULAR; INTRAVENOUS; SUBCUTANEOUS at 18:59

## 2023-02-16 RX ADMIN — MORPHINE SULFATE 2 MG: 2 INJECTION, SOLUTION INTRAMUSCULAR; INTRAVENOUS at 22:25

## 2023-02-16 RX ADMIN — METOPROLOL TARTRATE 25 MG: 25 TABLET, FILM COATED ORAL at 22:25

## 2023-02-16 RX ADMIN — DIPHENHYDRAMINE HYDROCHLORIDE 25 MG: 25 CAPSULE ORAL at 22:49

## 2023-02-16 RX ADMIN — TAZOBACTAM SODIUM AND PIPERACILLIN SODIUM 3.38 G: 375; 3 INJECTION, SOLUTION INTRAVENOUS at 20:43

## 2023-02-16 RX ADMIN — SODIUM CHLORIDE, POTASSIUM CHLORIDE, SODIUM LACTATE AND CALCIUM CHLORIDE 1000 ML: 600; 310; 30; 20 INJECTION, SOLUTION INTRAVENOUS at 18:59

## 2023-02-17 ENCOUNTER — APPOINTMENT (OUTPATIENT)
Dept: CARDIOLOGY | Facility: HOSPITAL | Age: 53
End: 2023-02-17
Payer: COMMERCIAL

## 2023-02-17 LAB
ANION GAP SERPL CALCULATED.3IONS-SCNC: 10 MMOL/L (ref 5–15)
BASOPHILS # BLD AUTO: 0.03 10*3/MM3 (ref 0–0.2)
BASOPHILS NFR BLD AUTO: 0.4 % (ref 0–1.5)
BH CV ECHO MEAS - AO MAX PG: 6.8 MMHG
BH CV ECHO MEAS - AO MEAN PG: 4 MMHG
BH CV ECHO MEAS - AO ROOT DIAM: 2.5 CM
BH CV ECHO MEAS - AO V2 MAX: 130 CM/SEC
BH CV ECHO MEAS - AO V2 VTI: 29.8 CM
BH CV ECHO MEAS - AVA(I,D): 2.07 CM2
BH CV ECHO MEAS - EDV(CUBED): 103.8 ML
BH CV ECHO MEAS - EDV(MOD-SP2): 59.8 ML
BH CV ECHO MEAS - EDV(MOD-SP4): 85.9 ML
BH CV ECHO MEAS - EF(MOD-BP): 68.6 %
BH CV ECHO MEAS - EF(MOD-SP2): 67.7 %
BH CV ECHO MEAS - EF(MOD-SP4): 68.6 %
BH CV ECHO MEAS - ESV(CUBED): 24.4 ML
BH CV ECHO MEAS - ESV(MOD-SP2): 19.3 ML
BH CV ECHO MEAS - ESV(MOD-SP4): 27 ML
BH CV ECHO MEAS - FS: 38.3 %
BH CV ECHO MEAS - IVS/LVPW: 1 CM
BH CV ECHO MEAS - IVSD: 0.8 CM
BH CV ECHO MEAS - LA DIMENSION: 3.5 CM
BH CV ECHO MEAS - LAT PEAK E' VEL: 12.4 CM/SEC
BH CV ECHO MEAS - LV MASS(C)D: 122.3 GRAMS
BH CV ECHO MEAS - LV MAX PG: 3.4 MMHG
BH CV ECHO MEAS - LV MEAN PG: 2 MMHG
BH CV ECHO MEAS - LV V1 MAX: 92 CM/SEC
BH CV ECHO MEAS - LV V1 VTI: 19.6 CM
BH CV ECHO MEAS - LVIDD: 4.7 CM
BH CV ECHO MEAS - LVIDS: 2.9 CM
BH CV ECHO MEAS - LVOT AREA: 3.1 CM2
BH CV ECHO MEAS - LVOT DIAM: 2 CM
BH CV ECHO MEAS - LVPWD: 0.8 CM
BH CV ECHO MEAS - MED PEAK E' VEL: 13.4 CM/SEC
BH CV ECHO MEAS - MV A MAX VEL: 78.6 CM/SEC
BH CV ECHO MEAS - MV DEC SLOPE: 207 CM/SEC2
BH CV ECHO MEAS - MV DEC TIME: 0.25 MSEC
BH CV ECHO MEAS - MV E MAX VEL: 62.6 CM/SEC
BH CV ECHO MEAS - MV E/A: 0.8
BH CV ECHO MEAS - MV MAX PG: 3 MMHG
BH CV ECHO MEAS - MV MEAN PG: 1 MMHG
BH CV ECHO MEAS - MV P1/2T: 101.7 MSEC
BH CV ECHO MEAS - MV V2 VTI: 25.4 CM
BH CV ECHO MEAS - MVA(P1/2T): 2.16 CM2
BH CV ECHO MEAS - MVA(VTI): 2.42 CM2
BH CV ECHO MEAS - PA ACC TIME: 0.15 SEC
BH CV ECHO MEAS - PA PR(ACCEL): 12.4 MMHG
BH CV ECHO MEAS - SV(LVOT): 61.6 ML
BH CV ECHO MEAS - SV(MOD-SP2): 40.5 ML
BH CV ECHO MEAS - SV(MOD-SP4): 58.9 ML
BH CV ECHO MEAS - TAPSE (>1.6): 2.22 CM
BH CV ECHO MEASUREMENTS AVERAGE E/E' RATIO: 4.85
BH CV XLRA - RV BASE: 3.4 CM
BH CV XLRA - RV LENGTH: 7 CM
BH CV XLRA - RV MID: 2.7 CM
BH CV XLRA - TDI S': 11.3 CM/SEC
BUN SERPL-MCNC: 14 MG/DL (ref 6–20)
BUN/CREAT SERPL: 18.2 (ref 7–25)
CALCIUM SPEC-SCNC: 9 MG/DL (ref 8.6–10.5)
CHLORIDE SERPL-SCNC: 101 MMOL/L (ref 98–107)
CHOLEST SERPL-MCNC: 144 MG/DL (ref 0–200)
CO2 SERPL-SCNC: 27 MMOL/L (ref 22–29)
CREAT SERPL-MCNC: 0.77 MG/DL (ref 0.57–1)
DEPRECATED RDW RBC AUTO: 45 FL (ref 37–54)
EGFRCR SERPLBLD CKD-EPI 2021: 94.1 ML/MIN/1.73
EOSINOPHIL # BLD AUTO: 0.1 10*3/MM3 (ref 0–0.4)
EOSINOPHIL NFR BLD AUTO: 1.5 % (ref 0.3–6.2)
ERYTHROCYTE [DISTWIDTH] IN BLOOD BY AUTOMATED COUNT: 13.2 % (ref 12.3–15.4)
GLUCOSE SERPL-MCNC: 93 MG/DL (ref 65–99)
HBA1C MFR BLD: 5.9 % (ref 4.8–5.6)
HCT VFR BLD AUTO: 41 % (ref 34–46.6)
HDLC SERPL-MCNC: 42 MG/DL (ref 40–60)
HGB BLD-MCNC: 13.6 G/DL (ref 12–15.9)
IMM GRANULOCYTES # BLD AUTO: 0.02 10*3/MM3 (ref 0–0.05)
IMM GRANULOCYTES NFR BLD AUTO: 0.3 % (ref 0–0.5)
LDLC SERPL CALC-MCNC: 78 MG/DL (ref 0–100)
LDLC/HDLC SERPL: 1.78 {RATIO}
LEFT ATRIUM VOLUME INDEX: 16.3 ML/M2
LYMPHOCYTES # BLD AUTO: 2.46 10*3/MM3 (ref 0.7–3.1)
LYMPHOCYTES NFR BLD AUTO: 36.6 % (ref 19.6–45.3)
MAGNESIUM SERPL-MCNC: 2 MG/DL (ref 1.6–2.6)
MAXIMAL PREDICTED HEART RATE: 170 BPM
MCH RBC QN AUTO: 30.4 PG (ref 26.6–33)
MCHC RBC AUTO-ENTMCNC: 33.2 G/DL (ref 31.5–35.7)
MCV RBC AUTO: 91.5 FL (ref 79–97)
MONOCYTES # BLD AUTO: 0.76 10*3/MM3 (ref 0.1–0.9)
MONOCYTES NFR BLD AUTO: 11.3 % (ref 5–12)
NEUTROPHILS NFR BLD AUTO: 3.36 10*3/MM3 (ref 1.7–7)
NEUTROPHILS NFR BLD AUTO: 49.9 % (ref 42.7–76)
NRBC BLD AUTO-RTO: 0 /100 WBC (ref 0–0.2)
PLATELET # BLD AUTO: 256 10*3/MM3 (ref 140–450)
PMV BLD AUTO: 10.5 FL (ref 6–12)
POTASSIUM SERPL-SCNC: 4.2 MMOL/L (ref 3.5–5.2)
QT INTERVAL: 396 MS
QTC INTERVAL: 465 MS
RBC # BLD AUTO: 4.48 10*6/MM3 (ref 3.77–5.28)
SODIUM SERPL-SCNC: 138 MMOL/L (ref 136–145)
STRESS TARGET HR: 145 BPM
TRIGL SERPL-MCNC: 137 MG/DL (ref 0–150)
TROPONIN T SERPL HS-MCNC: <6 NG/L
VLDLC SERPL-MCNC: 24 MG/DL (ref 5–40)
WBC NRBC COR # BLD: 6.73 10*3/MM3 (ref 3.4–10.8)

## 2023-02-17 PROCEDURE — G0378 HOSPITAL OBSERVATION PER HR: HCPCS

## 2023-02-17 PROCEDURE — 63710000001 DIPHENHYDRAMINE PER 50 MG: Performed by: NURSE PRACTITIONER

## 2023-02-17 PROCEDURE — 93306 TTE W/DOPPLER COMPLETE: CPT

## 2023-02-17 PROCEDURE — 99233 SBSQ HOSP IP/OBS HIGH 50: CPT | Performed by: FAMILY MEDICINE

## 2023-02-17 PROCEDURE — 80048 BASIC METABOLIC PNL TOTAL CA: CPT

## 2023-02-17 PROCEDURE — 99204 OFFICE O/P NEW MOD 45 MIN: CPT | Performed by: INTERNAL MEDICINE

## 2023-02-17 PROCEDURE — 85025 COMPLETE CBC W/AUTO DIFF WBC: CPT

## 2023-02-17 PROCEDURE — 96376 TX/PRO/DX INJ SAME DRUG ADON: CPT

## 2023-02-17 PROCEDURE — 25010000002 HYDROMORPHONE PER 4 MG: Performed by: NURSE PRACTITIONER

## 2023-02-17 PROCEDURE — 83735 ASSAY OF MAGNESIUM: CPT

## 2023-02-17 PROCEDURE — 83036 HEMOGLOBIN GLYCOSYLATED A1C: CPT | Performed by: FAMILY MEDICINE

## 2023-02-17 PROCEDURE — 25010000002 PIPERACILLIN SOD-TAZOBACTAM PER 1 G

## 2023-02-17 PROCEDURE — 84484 ASSAY OF TROPONIN QUANT: CPT

## 2023-02-17 PROCEDURE — 93306 TTE W/DOPPLER COMPLETE: CPT | Performed by: INTERNAL MEDICINE

## 2023-02-17 PROCEDURE — 80061 LIPID PANEL: CPT | Performed by: FAMILY MEDICINE

## 2023-02-17 RX ORDER — HYDROXYZINE HYDROCHLORIDE 25 MG/1
25 TABLET, FILM COATED ORAL 3 TIMES DAILY PRN
Status: DISCONTINUED | OUTPATIENT
Start: 2023-02-17 | End: 2023-02-21 | Stop reason: HOSPADM

## 2023-02-17 RX ORDER — ALIROCUMAB 75 MG/ML
75 INJECTION, SOLUTION SUBCUTANEOUS
Status: ON HOLD | COMMUNITY
End: 2023-04-03 | Stop reason: SDUPTHER

## 2023-02-17 RX ORDER — ACETAMINOPHEN 500 MG
1000 TABLET ORAL EVERY 6 HOURS PRN
Status: DISCONTINUED | OUTPATIENT
Start: 2023-02-17 | End: 2023-02-21 | Stop reason: HOSPADM

## 2023-02-17 RX ORDER — OMEPRAZOLE 40 MG/1
40 CAPSULE, DELAYED RELEASE ORAL DAILY
COMMUNITY
End: 2023-02-21 | Stop reason: HOSPADM

## 2023-02-17 RX ORDER — HYDROMORPHONE HYDROCHLORIDE 1 MG/ML
0.5 INJECTION, SOLUTION INTRAMUSCULAR; INTRAVENOUS; SUBCUTANEOUS EVERY 4 HOURS PRN
Status: DISCONTINUED | OUTPATIENT
Start: 2023-02-17 | End: 2023-02-17

## 2023-02-17 RX ORDER — MORPHINE SULFATE 2 MG/ML
2 INJECTION, SOLUTION INTRAMUSCULAR; INTRAVENOUS EVERY 4 HOURS PRN
Status: DISCONTINUED | OUTPATIENT
Start: 2023-02-17 | End: 2023-02-17

## 2023-02-17 RX ORDER — HYDROMORPHONE HYDROCHLORIDE 1 MG/ML
0.5 INJECTION, SOLUTION INTRAMUSCULAR; INTRAVENOUS; SUBCUTANEOUS
Status: DISCONTINUED | OUTPATIENT
Start: 2023-02-17 | End: 2023-02-17

## 2023-02-17 RX ORDER — HYDROCODONE BITARTRATE AND ACETAMINOPHEN 5; 325 MG/1; MG/1
1 TABLET ORAL EVERY 6 HOURS PRN
Status: DISCONTINUED | OUTPATIENT
Start: 2023-02-17 | End: 2023-02-18

## 2023-02-17 RX ORDER — LEVOTHYROXINE SODIUM 0.15 MG/1
150 TABLET ORAL
Status: DISCONTINUED | OUTPATIENT
Start: 2023-02-18 | End: 2023-02-21 | Stop reason: HOSPADM

## 2023-02-17 RX ORDER — IBUPROFEN 400 MG/1
800 TABLET ORAL ONCE
Status: COMPLETED | OUTPATIENT
Start: 2023-02-17 | End: 2023-02-17

## 2023-02-17 RX ADMIN — IBUPROFEN 800 MG: 400 TABLET, FILM COATED ORAL at 17:00

## 2023-02-17 RX ADMIN — LEVOTHYROXINE SODIUM 137 MCG: 137 TABLET ORAL at 05:23

## 2023-02-17 RX ADMIN — CYANOCOBALAMIN TAB 1000 MCG 1000 MCG: 1000 TAB at 09:24

## 2023-02-17 RX ADMIN — FLUOXETINE 40 MG: 20 CAPSULE ORAL at 09:24

## 2023-02-17 RX ADMIN — TIZANIDINE 4 MG: 4 TABLET ORAL at 21:17

## 2023-02-17 RX ADMIN — Medication 10 ML: at 21:35

## 2023-02-17 RX ADMIN — HYDROMORPHONE HYDROCHLORIDE 0.5 MG: 1 INJECTION, SOLUTION INTRAMUSCULAR; INTRAVENOUS; SUBCUTANEOUS at 09:33

## 2023-02-17 RX ADMIN — HYDROCHLOROTHIAZIDE 25 MG: 25 TABLET ORAL at 09:24

## 2023-02-17 RX ADMIN — DIPHENHYDRAMINE HYDROCHLORIDE 25 MG: 25 CAPSULE ORAL at 09:24

## 2023-02-17 RX ADMIN — HYDROXYZINE HYDROCHLORIDE 25 MG: 25 TABLET ORAL at 11:51

## 2023-02-17 RX ADMIN — AMITRIPTYLINE HYDROCHLORIDE 10 MG: 10 TABLET, FILM COATED ORAL at 21:05

## 2023-02-17 RX ADMIN — ACETAMINOPHEN 1000 MG: 500 TABLET ORAL at 21:05

## 2023-02-17 RX ADMIN — TAZOBACTAM SODIUM AND PIPERACILLIN SODIUM 3.38 G: 375; 3 INJECTION, SOLUTION INTRAVENOUS at 11:51

## 2023-02-17 RX ADMIN — LOSARTAN POTASSIUM 100 MG: 50 TABLET, FILM COATED ORAL at 09:24

## 2023-02-17 RX ADMIN — ATORVASTATIN CALCIUM 40 MG: 40 TABLET, FILM COATED ORAL at 09:24

## 2023-02-17 RX ADMIN — TAZOBACTAM SODIUM AND PIPERACILLIN SODIUM 3.38 G: 375; 3 INJECTION, SOLUTION INTRAVENOUS at 04:09

## 2023-02-17 RX ADMIN — Medication 10 ML: at 09:25

## 2023-02-17 RX ADMIN — HYDROMORPHONE HYDROCHLORIDE 0.5 MG: 1 INJECTION, SOLUTION INTRAMUSCULAR; INTRAVENOUS; SUBCUTANEOUS at 04:09

## 2023-02-17 RX ADMIN — TAZOBACTAM SODIUM AND PIPERACILLIN SODIUM 3.38 G: 375; 3 INJECTION, SOLUTION INTRAVENOUS at 21:05

## 2023-02-17 NOTE — CASE MANAGEMENT/SOCIAL WORK
Discharge Planning Assessment  [facility]     Patient Name: Heidy Whitman  MRN: [id number]  Today's Date: 2/17/2023    Admit Date: 2/16/2023    Plan: home   Discharge Needs Assessment     Row Name 02/17/23 0925       Living Environment    People in Home spouse    Name(s) of People in Home Jimi    Current Living Arrangements home    Primary Care Provided by self       Transition Planning    Patient/Family Anticipates Transition to home with family       Discharge Needs Assessment    Readmission Within the Last 30 Days no previous admission in last 30 days    Equipment Currently Used at Home none    Concerns to be Addressed discharge planning;basic needs               Discharge Plan     Row Name 02/17/23 0925       Plan    Plan home    Patient/Family in Agreement with Plan yes    Plan Comments I met with Ms Whitman at the bedside. She lives with her  in [facility]. She is independent with activities of daily living and mobility. She anticipates returning home after this hospitalization and her  can transport her home. Case management will continue to follow her plan of care and assist with any discharge planning needs.    Final Discharge Disposition Code 01 - home or self-care              Continued Care and Services - Admitted Since 2/16/2023    Coordination has not been started for this encounter.       Expected Discharge Date and Time     Expected Discharge Date Expected Discharge Time    Feb 24, 2023          Demographic Summary     Row Name 02/17/23 0924       General Information    General Information Comments I confirmed that Itzel Long is Ms Whitman's PCP and she has [facility] for insurance               Functional Status     Row Name 02/17/23 0924       Functional Status, IADL    Medications independent    Meal Preparation independent    Housekeeping independent    Laundry independent    Shopping independent               Psychosocial    No documentation.                 Abuse/Neglect    No documentation.                Legal    No documentation.                Substance Abuse    No documentation.                Patient Forms    No documentation.                   Magi Gandara RN

## 2023-02-17 NOTE — CONSULTS
Heidy Whitman  [id number]  [id number]  1970    Patient Care Team:  Itzel Long MD as PCP - General (Family Medicine)    Consulting Provider Annalisa Wood, internal medicine    Reason for Consult diverticulitis    Subjective     Patient is a 52 y.o. female with a past medical history significant for hyperlipidemia, hypertension, rheumatoid arthritis on Enbrel and hypothyroidism who is several 0 history of intermittent diverticulitis.  First episode was around 2019.  She has had a total of 5 episodes since then.  Last colonoscopy was in 2019 by my partner Dr. Little.  Patient stated that she recently suffered a flare in October 2022 which was quite significant and took her a while to recover.  In the beginning of February, she redeveloped some abdominal pain, CT scan confirmed presence of diverticulitis, she was given an outpatient oral regimen of Augmentin which she completed on February 13, 2023.  She did temporarily improve but quickly relapsed especially once her antibiotics were off.  Due to significant pain, she presented to [facility] on 2/16/2023.  Work-up showed normal white count but elevated inflammatory markers, CT scan shows signs of mid sigmoid diverticulitis, uncomplicated.  She has since been admitted and initiated on IV antibiotics and received a clear liquid diet this afternoon.    Colorectal surgery was consulted for assistance in management    Review of Systems   Pertinent items are noted in HPI, all other systems reviewed and negative. Specifically, there is no F/C/N/V/NSAID abuse, recent abx, new/unusual HA or visual disturbances, CP/SOB. Limb swelling, gait disturbance, new rashes or arthritis.       History  Past Medical History:   Diagnosis Date   • Arthritis    • Breast injury     July 2022-right breast hit with car door   • Current use of steroid medication 2/16/2023   • Disease of thyroid gland    • Diverticulitis    • Edema    • Hyperlipidemia    • Hypertension    •  Rheumatoid arthritis (HCC)      Past Surgical History:   Procedure Laterality Date   • ABDOMINAL SURGERY     • HYSTERECTOMY      age 30   • OOPHORECTOMY     • THYROID SURGERY       Family History   Problem Relation Age of Onset   • COPD Father    • Heart disease Father    • Breast cancer Paternal Grandmother         age unknown   • Colon cancer Paternal Grandfather    • Ovarian cancer Neg Hx      Social History     Tobacco Use   • Smoking status: Never   Vaping Use   • Vaping Use: Never used   Substance Use Topics   • Alcohol use: Not Currently     Comment: occasional use   • Drug use: No     Medications Prior to Admission   Medication Sig Dispense Refill Last Dose   • amitriptyline (ELAVIL) 10 MG tablet Take 10 mg by mouth Every Night.      • Cyanocobalamin (Vitamin B-12) 50 MCG lozenge Take 25 mcg by mouth.      • FLUoxetine (PROzac) 10 MG capsule Take 40 mg by mouth daily.      • furosemide (LASIX) 20 MG tablet Take 20 mg by mouth 2 (two) times a day.      • levothyroxine (SYNTHROID, LEVOTHROID) 137 MCG tablet Take 137 mcg by mouth daily.      • Loratadine-Pseudoephedrine (CLARITIN-D 24 HOUR PO) Take  by mouth daily.      • losartan-hydrochlorothiazide (HYZAAR) 100-25 MG per tablet Take 1 tablet by mouth daily.      • metoprolol succinate XL (TOPROL-XL) 25 MG 24 hr tablet Take 25 mg by mouth Daily.      • ondansetron ODT (ZOFRAN-ODT) 4 MG disintegrating tablet Place 1 tablet on the tongue Every 6 (Six) Hours As Needed for Nausea. 20 tablet 0    • potassium chloride (K-DUR,KLOR-CON) 20 MEQ tablet controlled-release ER tablet       • predniSONE (DELTASONE) 10 MG tablet Take 10 mg by mouth As Needed.      • predniSONE (DELTASONE) 10 MG tablet DAILY TAPER - 6/6/5/5/4/4/3/3/2/2/1/1 THEN D/C 42 tablet 0    • TiZANidine (ZANAFLEX) 4 MG capsule Take 4 mg by mouth 3 (three) times a day.        Allergies:  Guaifenesin & derivatives, Gabapentin, and Pregabalin    Objective     Vital Signs  Blood pressure 119/75, pulse 73,  "temperature 98.2 °F (36.8 °C), temperature source Oral, resp. rate 18, height 167.6 cm (66\"), weight 94.9 kg (209 lb 3.2 oz), SpO2 98 %.  I/O last 3 completed shifts:  In: 50 [IV Piggyback:50]  Out: -     Physical Exam:  General Appearance: Alert and Oriented  Head: normocephalic, atraumatic  Eyes: HELENA  Neck: trachea midline  Lungs: nonlabored respirations  Heart: regular rhythm & normal rate    Abdomen: Soft, tender to palpation in the left lower quadrant and suprapubic regions.  Nondistended.  No peritoneal signs.    Skin: no bruising or rash  Neurologic: Cranial Nerves grossly intact   Psych: normal      Results Review:   LABS  Results from last 7 days   Lab Units 02/17/23  0344 02/16/23  1627   WBC 10*3/mm3 6.73 8.22   HEMOGLOBIN g/dL 13.6 15.4   HEMATOCRIT % 41.0 46.4   PLATELETS 10*3/mm3 256 306     Results from last 7 days   Lab Units 02/17/23  0344 02/16/23  1630 02/16/23  1627   SODIUM mmol/L 138  --  141   POTASSIUM mmol/L 4.2  --  4.1   CHLORIDE mmol/L 101  --  102   CO2 mmol/L 27.0  --  27.0   BUN mg/dL 14  --  13   CREATININE mg/dL 0.77 0.90  0.90 0.91   GLUCOSE mg/dL 93  --  106*   CALCIUM mg/dL 9.0  --  9.2       IMAGING  Imaging Results (Last 72 Hours)     Procedure Component Value Units Date/Time    XR Chest 1 View [[id number]] Collected: 02/16/23 2029     Updated: 02/16/23 2032    Narrative:      XR CHEST 1 VW    Date of Exam: 2/16/2023 8:00 PM EST    Indication: chest pain.    Comparison: Chest x-ray 8/16/2016    Findings:  Normal cardiomediastinal silhouette. The lungs are clear. No pleural effusion or pneumothorax. No acute osseous findings.      Impression:      Impression:  No acute cardiopulmonary findings.       Electronically Signed: Keon Bunch    2/16/2023 8:30 PM EST    Workstation ID: [id number]    CT Abdomen Pelvis With Contrast [[id number]] Collected: 02/16/23 1742     Updated: 02/16/23 1754    Narrative:      CT ABDOMEN PELVIS W CONTRAST    Date of Exam: 2/16/2023 5:33 PM " EST    Indication: LLQ pain.    Comparison: CT abdomen and pelvis 10/16/2022    Technique: Axial CT images were obtained of the abdomen and pelvis following the uneventful intravenous administration of 100 mL Isovue-300. Reconstructed coronal and sagittal images were also obtained. Automated exposure control and iterative   construction methods were used.    Findings:  Unremarkable appearance of the lower thorax. There is diffuse hypoattenuation of the liver parenchyma compatible with hepatic steatosis. Unremarkable appearance of the gallbladder and bile ducts. Normal appearance of the spleen, pancreas, adrenal glands,   kidneys, ureters, and decompressed bladder. The uterus is surgically absent.    There is a short 4 cm segment of mid sigmoid colonic wall thickening with peridiverticular fat stranding, compatible with acute diverticulitis. No peridiverticular abscess/fluid or extraluminal air to suggest perforation. The location of diverticulitis   is similar to that of prior CT from October 2022. Otherwise grossly unremarkable appearance of the GI tract. No abdominopelvic free fluid. No pneumoperitoneum.    Normal appearance of the vasculature. No lymphadenopathy. Unremarkable appearance of the body wall soft tissues. No acute or suspicious bony findings.      Impression:      Impression:  Acute uncomplicated sigmoid diverticulitis. The location is similar to prior diverticulitis seen on October 2022 CT. Given recurrent diverticulitis in this area, colonoscopy is recommended to exclude possibility of colonic neoplasm in this area, and   correlate with colonoscopy history.    Electronically Signed: Keon Bunch    2/16/2023 5:52 PM EST    Workstation ID: [id number]           I reviewed the patient's new clinical results.  Personally reviewed her CT scan from 2/16/2023, this shows evidence of mid sigmoid diverticulitis and a bit of mesenteric stranding.  There is no signs of perforation, no extraluminal  air.      Assessment & Plan       Diverticulitis    HTN (hypertension)    HLD (hyperlipidemia)    Hypothyroidism (acquired)    Rheumatoid arthritis involving multiple sites (HCC)    Other chest pain    BLAEK (dyspnea on exertion)    Current use of steroid medication    This a 52-year-old female who presented to [facility] with uncomplicated diverticulitis after failed outpatient management.  This is her fifth bout in the most recent years.  She does not exhibit signs of toxicity    Plan  At this point, I would continue with only a clear liquid diet  Agree with IV antibiotics  I will reevaluate tomorrow, hopefully we can advance her diet in the next 24 hours.  As she does not have any abscess, perhaps when she is improved, she can go out on Cipro and Flagyl, this may be a bit more efficacious than her previous regiment of Augmentin. This in an effort to keep her off IV antibiotics.    Ultimately, she will require a low anterior resection in the future, wish to pursue conservative measures now in order to get her to a more elective colectomy.    I discussed the patients findings and my recommendations with patient and family.     Isidro Stanley MD  02/17/23  15:54 EST    Time: More than 50% of time spent in counseling and coordination of care:  Total face-to-face/floor time 30 min.  Time spent in counseling 10 min. Counseling included the following topics: Diverticulitis, low anterior resection, colonoscopy

## 2023-02-17 NOTE — ED PROVIDER NOTES
"Subjective   History of Present Illness  52-year-old female sent by her primary care physician to be evaluated for multiple complaints.  Of note, the patient has a history of recurrent diverticulitis.  She saw her primary care physician regarding symptoms of lower abdominal pain on February 2 and 6 days ago had an outpatient CT scan that revealed acute diverticulitis.  She endorses compliance with an outpatient course of Augmentin but notes that she has had persistent pain and notes that her symptoms do not seem to be improving but actually do seem to be worsening.  She is still experiencing persistent pain in her lower abdomen, primarily in her right lower quadrant.  She endorses a subjective fever over the past 24 hours as well.  She endorses accompanying occasional episodes of chest pain over the past few days as well.  She describes the pain as fleeting and as a \"pressure.\"  She denies any accompanying vomiting or diaphoresis.  She notes that her blood pressure has been quite elevated as well.  She was unsure as to whether this was secondary to stress or pain.  Given all of her symptoms, she was advised to come to the emergency department to be evaluated today by her primary care physician.        Review of Systems   Respiratory: Positive for chest tightness.    Cardiovascular: Positive for chest pain.   Gastrointestinal: Positive for abdominal pain and nausea.   All other systems reviewed and are negative.      Past Medical History:   Diagnosis Date   • Arthritis    • Breast injury     July 2022-right breast hit with car door   • Current use of steroid medication 2/16/2023   • Disease of thyroid gland    • Diverticulitis    • Edema    • Hyperlipidemia    • Hypertension    • Rheumatoid arthritis (HCC)        Allergies   Allergen Reactions   • Guaifenesin & Derivatives    • Gabapentin Hives and Rash   • Pregabalin Hives and Rash       Past Surgical History:   Procedure Laterality Date   • ABDOMINAL SURGERY     • " HYSTERECTOMY      age 30   • OOPHORECTOMY     • THYROID SURGERY         Family History   Problem Relation Age of Onset   • COPD Father    • Heart disease Father    • Breast cancer Paternal Grandmother         age unknown   • Colon cancer Paternal Grandfather    • Ovarian cancer Neg Hx        Social History     Socioeconomic History   • Marital status:    Tobacco Use   • Smoking status: Never   Vaping Use   • Vaping Use: Never used   Substance and Sexual Activity   • Alcohol use: Not Currently     Comment: occasional use   • Drug use: No   • Sexual activity: Defer           Objective   Physical Exam  Vitals and nursing note reviewed.   Constitutional:       General: She is not in acute distress.     Appearance: She is well-developed. She is not diaphoretic.      Comments: Nontoxic-appearing female   HENT:      Head: Normocephalic and atraumatic.   Cardiovascular:      Rate and Rhythm: Normal rate and regular rhythm.      Heart sounds: Normal heart sounds. No murmur heard.    No friction rub. No gallop.   Pulmonary:      Effort: Pulmonary effort is normal. No respiratory distress.      Breath sounds: Normal breath sounds. No wheezing or rales.   Abdominal:      General: Bowel sounds are normal. There is no distension.      Palpations: Abdomen is soft. There is no mass.      Tenderness: There is abdominal tenderness. There is guarding. There is no rebound.      Comments: Focal tenderness noted to left lower quadrant with voluntary guarding present, no pain out of proportion to exam   Genitourinary:     Comments: No CVA tenderness noted  Musculoskeletal:         General: Normal range of motion.      Cervical back: Neck supple.   Skin:     General: Skin is warm and dry.      Findings: No erythema or rash.      Comments: No dermatomal rash present   Neurological:      Mental Status: She is alert and oriented to person, place, and time.   Psychiatric:         Mood and Affect: Mood normal.         Thought Content:  Thought content normal.         Judgment: Judgment normal.         Procedures           ED Course  ED Course as of 02/17/23 0154   u Feb 16, 2023   1636 52-year-old female sent by her primary care physician to be evaluated for multiple complaints.  Of note, the patient has a history of recurrent diverticulitis.  She saw her primary care physician regarding her symptoms of lower abdominal pain on February 2 and 6 days ago had an outpatient CT scan that revealed acute diverticulitis.  She endorses compliance with an outpatient course of Augmentin but notes that her symptoms do not seem to be improving.  She is experiencing persistent lower abdominal pain, primarily in her left lower quadrant.  She endorses subjective fever.  She endorses accompanying occasional chest pain over the past few days as well.  She notes that her blood pressure has been elevated.  Given all of her symptoms, she was advised to come to the emergency department today by her primary care physician.  On arrival, the patient is nontoxic-appearing.  Vital signs are reassuring.  Exam remarkable for focal left lower quadrant tenderness with guarding noted.  No pain out of proportion to exam.  No dermatomal rash noted.  No CVA tenderness noted.  Broad differential diagnosis.  We will obtain labs and imaging, and we will reassess following initial interventions.  The patient's only cardiac risk factor is hypertension.  EKG revealed normal sinus rhythm with a heart rate of 83 and no ST segments suggestive of or concerning for ischemia. [DD]   1834 After reviewing the patient's labs and imaging, she was reevaluated.  We discussed inpatient versus outpatient management, and using shared decision-making, we elected for the former.  I feel that this is completely reasonable as she has clearly failed outpatient treatment at this point.  I discussed the patient's case with Dr. Perez, the patient will be admitted under her care for further evaluation and  treatment.  IV Zosyn given.  We will admit the patient to the hospital and seek a gastroenterology consult given CT findings as well.  She is aware/agreeable with the plan at this time. [DD]   Fri Feb 17, 2023 0154 I personally and independently viewed the patient's x-ray images myself, and I am in agreement with the radiologist's reading for final interpretation.   [DD]      ED Course User Index  [DD] Paulo Glover MD                                       Recent Results (from the past 24 hour(s))   ECG 12 Lead ED Triage Standing Order; Abdominal Pain (Upper)    Collection Time: 02/16/23  4:00 PM   Result Value Ref Range    QT Interval 396 ms    QTC Interval 465 ms   Comprehensive Metabolic Panel    Collection Time: 02/16/23  4:27 PM    Specimen: Blood   Result Value Ref Range    Glucose 106 (H) 65 - 99 mg/dL    BUN 13 6 - 20 mg/dL    Creatinine 0.91 0.57 - 1.00 mg/dL    Sodium 141 136 - 145 mmol/L    Potassium 4.1 3.5 - 5.2 mmol/L    Chloride 102 98 - 107 mmol/L    CO2 27.0 22.0 - 29.0 mmol/L    Calcium 9.2 8.6 - 10.5 mg/dL    Total Protein 7.3 6.0 - 8.5 g/dL    Albumin 4.5 3.5 - 5.2 g/dL    ALT (SGPT) 68 (H) 1 - 33 U/L    AST (SGOT) 52 (H) 1 - 32 U/L    Alkaline Phosphatase 101 39 - 117 U/L    Total Bilirubin 0.5 0.0 - 1.2 mg/dL    Globulin 2.8 gm/dL    A/G Ratio 1.6 g/dL    BUN/Creatinine Ratio 14.3 7.0 - 25.0    Anion Gap 12.0 5.0 - 15.0 mmol/L    eGFR 77.0 >60.0 mL/min/1.73   Lipase    Collection Time: 02/16/23  4:27 PM    Specimen: Blood   Result Value Ref Range    Lipase 24 13 - 60 U/L   High Sensitivity Troponin T    Collection Time: 02/16/23  4:27 PM    Specimen: Blood   Result Value Ref Range    HS Troponin T 11 (H) <10 ng/L   Green Top (Gel)    Collection Time: 02/16/23  4:27 PM   Result Value Ref Range    Extra Tube Hold for add-ons.    Lavender Top    Collection Time: 02/16/23  4:27 PM   Result Value Ref Range    Extra Tube hold for add-on    Gold Top - SST    Collection Time: 02/16/23  4:27 PM    Result Value Ref Range    Extra Tube Hold for add-ons.    Gray Top    Collection Time: 02/16/23  4:27 PM   Result Value Ref Range    Extra Tube Hold for add-ons.    Light Blue Top    Collection Time: 02/16/23  4:27 PM   Result Value Ref Range    Extra Tube Hold for add-ons.    CBC Auto Differential    Collection Time: 02/16/23  4:27 PM    Specimen: Blood   Result Value Ref Range    WBC 8.22 3.40 - 10.80 10*3/mm3    RBC 5.15 3.77 - 5.28 10*6/mm3    Hemoglobin 15.4 12.0 - 15.9 g/dL    Hematocrit 46.4 34.0 - 46.6 %    MCV 90.1 79.0 - 97.0 fL    MCH 29.9 26.6 - 33.0 pg    MCHC 33.2 31.5 - 35.7 g/dL    RDW 13.1 12.3 - 15.4 %    RDW-SD 42.9 37.0 - 54.0 fl    MPV 10.2 6.0 - 12.0 fL    Platelets 306 140 - 450 10*3/mm3    Neutrophil % 59.6 42.7 - 76.0 %    Lymphocyte % 27.7 19.6 - 45.3 %    Monocyte % 11.2 5.0 - 12.0 %    Eosinophil % 0.7 0.3 - 6.2 %    Basophil % 0.4 0.0 - 1.5 %    Immature Grans % 0.4 0.0 - 0.5 %    Neutrophils, Absolute 4.90 1.70 - 7.00 10*3/mm3    Lymphocytes, Absolute 2.28 0.70 - 3.10 10*3/mm3    Monocytes, Absolute 0.92 (H) 0.10 - 0.90 10*3/mm3    Eosinophils, Absolute 0.06 0.00 - 0.40 10*3/mm3    Basophils, Absolute 0.03 0.00 - 0.20 10*3/mm3    Immature Grans, Absolute 0.03 0.00 - 0.05 10*3/mm3    nRBC 0.0 0.0 - 0.2 /100 WBC   Lactic Acid, Plasma    Collection Time: 02/16/23  4:27 PM    Specimen: Blood   Result Value Ref Range    Lactate 1.2 0.5 - 2.0 mmol/L   Procalcitonin    Collection Time: 02/16/23  4:27 PM    Specimen: Blood   Result Value Ref Range    Procalcitonin 0.06 0.00 - 0.25 ng/mL   Sedimentation Rate    Collection Time: 02/16/23  4:27 PM    Specimen: Blood   Result Value Ref Range    Sed Rate 36 (H) 0 - 30 mm/hr   POC Creatinine    Collection Time: 02/16/23  4:30 PM    Specimen: Blood   Result Value Ref Range    Creatinine 0.90 mg/dL   POC Creatinine    Collection Time: 02/16/23  4:30 PM    Specimen: Blood   Result Value Ref Range    Creatinine 0.90 0.60 - 1.30 mg/dL   Urinalysis With  Microscopic If Indicated (No Culture) - Urine, Clean Catch    Collection Time: 02/16/23  5:32 PM    Specimen: Urine, Clean Catch   Result Value Ref Range    Color, UA Yellow Yellow, Straw    Appearance, UA Clear Clear    pH, UA 8.0 5.0 - 8.0    Specific Gravity, UA 1.023 1.001 - 1.030    Glucose, UA Negative Negative    Ketones, UA Trace (A) Negative    Bilirubin, UA Negative Negative    Blood, UA Negative Negative    Protein, UA Trace (A) Negative    Leuk Esterase, UA Trace (A) Negative    Nitrite, UA Negative Negative    Urobilinogen, UA 1.0 E.U./dL 0.2 - 1.0 E.U./dL   Urinalysis, Microscopic Only - Urine, Clean Catch    Collection Time: 02/16/23  5:32 PM    Specimen: Urine, Clean Catch   Result Value Ref Range    RBC, UA 7-12 (A) None Seen, 0-2 /HPF    WBC, UA 0-2 None Seen, 0-2 /HPF    Bacteria, UA None Seen None Seen, Trace /HPF    Squamous Epithelial Cells, UA 7-12 (A) None Seen, 0-2 /HPF    Hyaline Casts, UA 0-6 0 - 6 /LPF    Methodology Manual Light Microscopy    High Sensitivity Troponin T 2Hr    Collection Time: 02/16/23  6:58 PM    Specimen: Blood   Result Value Ref Range    HS Troponin T 7 <10 ng/L    Troponin T Delta -4 (L) >=-4 - <+4 ng/L   proBNP    Collection Time: 02/16/23  6:58 PM    Specimen: Blood   Result Value Ref Range    proBNP 34.6 0.0 - 900.0 pg/mL   C-reactive Protein    Collection Time: 02/16/23  6:58 PM    Specimen: Blood   Result Value Ref Range    C-Reactive Protein 1.40 (H) 0.00 - 0.50 mg/dL     Note: In addition to lab results from this visit, the labs listed above may include labs taken at another facility or during a different encounter within the last 24 hours. Please correlate lab times with ED admission and discharge times for further clarification of the services performed during this visit.    XR Chest 1 View   Final Result   Impression:   No acute cardiopulmonary findings.          Electronically Signed: Keon Bunch     2/16/2023 8:30 PM EST     Workstation ID: [id number]       CT Abdomen Pelvis With Contrast   Final Result   Impression:   Acute uncomplicated sigmoid diverticulitis. The location is similar to prior diverticulitis seen on October 2022 CT. Given recurrent diverticulitis in this area, colonoscopy is recommended to exclude possibility of colonic neoplasm in this area, and    correlate with colonoscopy history.      Electronically Signed: Keon Bunch     2/16/2023 5:52 PM EST     Workstation ID: [id number]        Vitals:    02/16/23 1954 02/16/23 2000 02/16/23 2102 02/16/23 2208   BP:  125/81 126/75 127/77   BP Location:    Left arm   Patient Position:    Lying   Pulse: 73 76 72 72   Resp:    18   Temp:    98.3 °F (36.8 °C)   TempSrc:    Oral   SpO2: 93%  93% 97%   Weight:    94.9 kg (209 lb 3.2 oz)   Height:         Medications   sodium chloride 0.9 % flush 10 mL (has no administration in time range)   atorvastatin (LIPITOR) tablet 40 mg (has no administration in time range)   FLUoxetine (PROzac) capsule 40 mg (has no administration in time range)   levothyroxine (SYNTHROID, LEVOTHROID) tablet 137 mcg (has no administration in time range)   losartan (COZAAR) tablet 100 mg (has no administration in time range)     And   hydroCHLOROthiazide (HYDRODIURIL) tablet 25 mg (has no administration in time range)   metoprolol tartrate (LOPRESSOR) tablet 25 mg (25 mg Oral Given 2/16/23 2225)   amitriptyline (ELAVIL) tablet 10 mg (10 mg Oral Given 2/16/23 2243)   vitamin B-12 (CYANOCOBALAMIN) tablet 1,000 mcg (has no administration in time range)   tiZANidine (ZANAFLEX) tablet 4 mg (has no administration in time range)   sodium chloride 0.9 % flush 10 mL (10 mL Intravenous Given 2/16/23 2251)   sodium chloride 0.9 % flush 10 mL (has no administration in time range)   sodium chloride 0.9 % infusion 40 mL (has no administration in time range)   ondansetron (ZOFRAN) tablet 4 mg (has no administration in time range)     Or   ondansetron (ZOFRAN) injection 4 mg (has no administration in time  range)   piperacillin-tazobactam (ZOSYN) 3.375 g in iso-osmotic dextrose 50 ml (premix) (has no administration in time range)   diphenhydrAMINE (BENADRYL) capsule 25 mg (25 mg Oral Given 2/16/23 2249)   HYDROmorphone (DILAUDID) injection 0.5 mg (has no administration in time range)   lactated ringers bolus 1,000 mL (0 mL Intravenous Stopped 2/16/23 2252)   ondansetron (ZOFRAN) injection 4 mg (4 mg Intravenous Given 2/16/23 1859)   HYDROmorphone (DILAUDID) injection 0.5 mg (0.5 mg Intravenous Given 2/16/23 1859)   iopamidol (ISOVUE-300) 61 % injection 100 mL (100 mL Intravenous Given 2/16/23 1738)   piperacillin-tazobactam (ZOSYN) 3.375 g in iso-osmotic dextrose 50 ml (premix) (0 g Intravenous Stopped 2/16/23 2113)     ECG/EMG Results (last 24 hours)     Procedure Component Value Units Date/Time    ECG 12 Lead ED Triage Standing Order; Abdominal Pain (Upper) [[id number]] Collected: 02/16/23 1600     Updated: 02/16/23 1600     QT Interval 396 ms      QTC Interval 465 ms     Narrative:      Test Reason : ED Triage Standing Order~  Blood Pressure :   */*   mmHG  Vent. Rate :  83 BPM     Atrial Rate :  83 BPM     P-R Int : 140 ms          QRS Dur :  80 ms      QT Int : 396 ms       P-R-T Axes :  32  -4  27 degrees     QTc Int : 465 ms    Normal sinus rhythm  Nonspecific ST abnormality  Abnormal ECG  No previous ECGs available    Referred By:            Confirmed By:         ECG 12 Lead ED Triage Standing Order; Abdominal Pain (Upper)   Preliminary Result   Test Reason : ED Triage Standing Order~   Blood Pressure :   */*   mmHG   Vent. Rate :  83 BPM     Atrial Rate :  83 BPM      P-R Int : 140 ms          QRS Dur :  80 ms       QT Int : 396 ms       P-R-T Axes :  32  -4  27 degrees      QTc Int : 465 ms      Normal sinus rhythm   Nonspecific ST abnormality   Abnormal ECG   No previous ECGs available      Referred By:            Confirmed By:       ECG 12 Lead Chest Pain    (Results Pending)              MDM    Final  diagnoses:   Acute diverticulitis       ED Disposition  ED Disposition     ED Disposition   Decision to Admit    Condition   --    Comment   Level of Care: Telemetry [5]   Diagnosis: Diverticulitis [087083]   Admitting Physician: EVA HOLLOWAY [[id number]]   Attending Physician: EVA HOLLOWAY [[id number]]   Bed Request Comments: tele               No follow-up provider specified.       Medication List      No changes were made to your prescriptions during this visit.          Paulo Glover MD  02/17/23 0155

## 2023-02-17 NOTE — PROGRESS NOTES
[facility] Medicine Services  PROGRESS NOTE    Patient Name: Heidy Whitman  : 1970  MRN: [id number]    Date of Admission: 2023  Primary Care Physician: Itzel Long MD    Subjective   Subjective     CC:  F/U recurrent diverticulitis    HPI:  Patient seen and examined. Still having some LLQ pain. No N/V/D or fever. Also discussed her chest pain. Associated with elevated BP and headaches.     ROS:  Gen- No fevers, chills  CV- No chest pain, palpitations  Resp- No cough, dyspnea  GI- No N/V/D, +LLQ abd pain    Objective   Objective     Vital Signs:   Temp:  [98.2 °F (36.8 °C)-98.8 °F (37.1 °C)] 98.2 °F (36.8 °C)  Heart Rate:  [60-88] 73  Resp:  [18] 18  BP: (115-128)/(71-94) 119/75  Flow (L/min):  [2] 2     Physical Exam:  Constitutional: No acute distress, awake, alert  HENT: NCAT, mucous membranes moist  Respiratory: Clear to auscultation bilaterally, respiratory effort normal   Cardiovascular: RRR, no murmurs, rubs, or gallops  Gastrointestinal: Positive bowel sounds, soft, non-distended, +TTP LLQ, no rigidity, rebound or guarding   Musculoskeletal: No bilateral ankle edema  Psychiatric: Appropriate affect, cooperative  Neurologic: Oriented x 3, no focal deficits, speech clear  Skin: No rashes    Results Reviewed:  LAB RESULTS:      Lab 23  0344 23  1858 23  1627   WBC 6.73  --  8.22   HEMOGLOBIN 13.6  --  15.4   HEMATOCRIT 41.0  --  46.4   PLATELETS 256  --  306   NEUTROS ABS 3.36  --  4.90   IMMATURE GRANS (ABS) 0.02  --  0.03   LYMPHS ABS 2.46  --  2.28   MONOS ABS 0.76  --  0.92*   EOS ABS 0.10  --  0.06   MCV 91.5  --  90.1   SED RATE  --   --  36*   CRP  --  1.40*  --    PROCALCITONIN  --   --  0.06   LACTATE  --   --  1.2         Lab 23  0344 23  1630 23  1627   SODIUM 138  --  141   POTASSIUM 4.2  --  4.1   CHLORIDE 101  --  102   CO2 27.0  --  27.0   ANION GAP 10.0  --  12.0   BUN 14  --  13   CREATININE 0.77 0.90  0.90  0.91   EGFR 94.1  --  77.0   GLUCOSE 93  --  106*   CALCIUM 9.0  --  9.2   MAGNESIUM 2.0  --   --    HEMOGLOBIN A1C 5.90*  --   --          Lab 02/16/23  1627   TOTAL PROTEIN 7.3   ALBUMIN 4.5   GLOBULIN 2.8   ALT (SGPT) 68*   AST (SGOT) 52*   BILIRUBIN 0.5   ALK PHOS 101   LIPASE 24         Lab 02/17/23  0344 02/16/23  1858 02/16/23  1627   PROBNP  --  34.6  --    HSTROP T <6 7 11*         Lab 02/17/23  0344   CHOLESTEROL 144   LDL CHOL 78   HDL CHOL 42   TRIGLYCERIDES 137             Brief Urine Lab Results  (Last result in the past 365 days)      Color   Clarity   Blood   Leuk Est   Nitrite   Protein   CREAT   Urine HCG        02/16/23 1732 Yellow   Clear   Negative   Trace   Negative   Trace                 Microbiology Results Abnormal     None          CT Abdomen Pelvis With Contrast    Result Date: 2/16/2023  CT ABDOMEN PELVIS W CONTRAST Date of Exam: 2/16/2023 5:33 PM EST Indication: LLQ pain. Comparison: CT abdomen and pelvis 10/16/2022 Technique: Axial CT images were obtained of the abdomen and pelvis following the uneventful intravenous administration of 100 mL Isovue-300. Reconstructed coronal and sagittal images were also obtained. Automated exposure control and iterative construction methods were used. Findings: Unremarkable appearance of the lower thorax. There is diffuse hypoattenuation of the liver parenchyma compatible with hepatic steatosis. Unremarkable appearance of the gallbladder and bile ducts. Normal appearance of the spleen, pancreas, adrenal glands,  kidneys, ureters, and decompressed bladder. The uterus is surgically absent. There is a short 4 cm segment of mid sigmoid colonic wall thickening with peridiverticular fat stranding, compatible with acute diverticulitis. No peridiverticular abscess/fluid or extraluminal air to suggest perforation. The location of diverticulitis is similar to that of prior CT from October 2022. Otherwise grossly unremarkable appearance of the GI tract. No  abdominopelvic free fluid. No pneumoperitoneum. Normal appearance of the vasculature. No lymphadenopathy. Unremarkable appearance of the body wall soft tissues. No acute or suspicious bony findings.     Impression: Impression: Acute uncomplicated sigmoid diverticulitis. The location is similar to prior diverticulitis seen on October 2022 CT. Given recurrent diverticulitis in this area, colonoscopy is recommended to exclude possibility of colonic neoplasm in this area, and correlate with colonoscopy history. Electronically Signed: Keon Tang Song  2/16/2023 5:52 PM EST  Workstation ID: [id number]    XR Chest 1 View    Result Date: 2/16/2023  XR CHEST 1 VW Date of Exam: 2/16/2023 8:00 PM EST Indication: chest pain. Comparison: Chest x-ray 8/16/2016 Findings: Normal cardiomediastinal silhouette. The lungs are clear. No pleural effusion or pneumothorax. No acute osseous findings.     Impression: Impression: No acute cardiopulmonary findings.  Electronically Signed: Keon Tang Song  2/16/2023 8:30 PM EST  Workstation ID: [id number]          I have reviewed the medications:  Scheduled Meds:amitriptyline, 10 mg, Oral, Nightly  atorvastatin, 40 mg, Oral, Daily  FLUoxetine, 40 mg, Oral, Daily  losartan, 100 mg, Oral, Q24H   And  hydroCHLOROthiazide, 25 mg, Oral, Q24H  levothyroxine, 137 mcg, Oral, Q AM  metoprolol tartrate, 25 mg, Oral, Daily  piperacillin-tazobactam, 3.375 g, Intravenous, Q8H  sodium chloride, 10 mL, Intravenous, Q12H  vitamin B-12, 1,000 mcg, Oral, Daily      Continuous Infusions:Pharmacy Consult,       PRN Meds:.•  diphenhydrAMINE  •  HYDROcodone-acetaminophen  •  hydrOXYzine  •  ondansetron **OR** ondansetron  •  Pharmacy Consult  •  sodium chloride  •  sodium chloride  •  sodium chloride  •  tiZANidine    Assessment & Plan   Assessment & Plan     Active Hospital Problems    Diagnosis  POA   • **Diverticulitis [K57.92]  Yes   • HTN (hypertension) [I10]  Unknown   • HLD (hyperlipidemia) [E78.5]  Unknown   •  Hypothyroidism (acquired) [E03.9]  Unknown   • Rheumatoid arthritis involving multiple sites (HCC) [M06.9]  Unknown   • Other chest pain [R07.89]  Unknown   • BLAKE (dyspnea on exertion) [R06.09]  Unknown   • Current use of steroid medication [Z79.52]  Not Applicable      Resolved Hospital Problems   No resolved problems to display.        Brief Hospital Course to date:  Heidy Whitman is a 52 y.o. female with PMH of HTN, hypothyroidism, HLD, RA and diverticulitis (x5) presents to the ED for abdominal pain.    This patient's problems and plans were partially entered by my partner and updated as appropriate by me 02/17/23.  All problems are new to me today      Diverticulitis  -CT abd shows acute uncomplicated sigmoid diverticulitis, location is similar to prior diverticulitis  -Last colonoscopy in 2019 with Dr. Little  -Pain control. Patient itching with Dilaudid. Also has itching with Morphine. Trial of Norco. PRN Atarax for itching   -Antiemetics  -Failed outpatient therapy of Augmentin  -Continue IV Zosyn  -S/P Maintenance fluids  -Colorectal Surgery consult, follows with Dr Little  -Trial of clear liquids      Chest Pain  -Trop negative  -Reported EKG changes per PCP  -EKG here shows NSR, no acute ST changes  -Echo pending   -Cardiology following, plan for Stress Test Monday     Uncontrolled HTN, HLD  -Continue home statin, losartan/HCTZ, metoprolol  -Hold home lasix for now     RA  -On amitryptiline   -Gets Enbrel and Praluent injects, last was 2/14  -Currently on prednisone taper.  We will need to clarify where patient is in her current taper, will have pharmacy clarify   -Follows with Dr Adarsh Patton      Hypothyroidism  -Continue home synthroid    Expected Discharge Location and Transportation: Home   Expected Discharge   Expected Discharge Date and Time     Expected Discharge Date Expected Discharge Time    Feb 21, 2023            DVT prophylaxis:  Mechanical DVT prophylaxis orders are present.          CODE  STATUS:   Code Status and Medical Interventions:   Ordered at: 02/16/23 1954     Level Of Support Discussed With:    Patient     Code Status (Patient has no pulse and is not breathing):    CPR (Attempt to Resuscitate)     Medical Interventions (Patient has pulse or is breathing):    Full Support       Annalisa Wood, DO  02/17/23
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
## 2023-02-17 NOTE — H&P
"    [facility] Medicine Services  HISTORY AND PHYSICAL    Patient Name: Heidy Whitman  : 1970  MRN: [id number]  Primary Care Physician: Itzel Long MD  Date of admission: 2023    Subjective   Subjective     Chief Complaint:  Abdominal Pain    HPI:  Heidy Whitman is a 52 y.o. female with PMH of HTN, hypothyroidism, HLD, RA and diverticulitis (x5) presents to the ED for abdominal pain. She does have a family history of colon cancer. Patient saw her PCP on  for left lower abdominal pain. She had a CT scan that confirmed diverticulitis. She was started on Augmentin. Since finishing her antibiotics on , patient has had gradual increase in abdominal pain. She is distended. Last night, she wasn't able to sleep. She had a 10/10 pain. She reports she has been on a clear liquid diet. On Monday and Tuesday she had severe pain again. She has been nauseated, but has not vomited. Denies diarrhea. But chronic has loss stools due to increased fiber intake. Denies fever, V/D, dysuria.     She also has had dull intermittent chest pains. She has radiation into her neck and back. She gets dyspnea on exertion and has had bilateral lower extremity swelling, despite taking lasix at home. She has also been having high blood pressures with associated headaches. Her PCP started her on metoprolol recently.     Of note, patient has had multiple episodes of diverticulitis in the past. She has followed with a dietitian to reduce \"trigger\" foods. Her last colonoscopy was in 2019 with Dr. Little. She reports severe pain last night despite completely a course of Augmentin. And pain more severe after BMs. Also with Family hx of colon cancer.  Therefore we will request input from Dr Little.         Review of Systems   Constitutional: Positive for activity change and appetite change. Negative for fever.   HENT: Negative for congestion, sore throat and trouble swallowing.    Eyes: Negative.  "   Respiratory: Positive for shortness of breath (on exertion). Negative for cough and wheezing.    Cardiovascular: Positive for chest pain and leg swelling.   Gastrointestinal: Positive for abdominal distention, abdominal pain, constipation (significant constipation in the past, currentl with loose stools ) and nausea. Negative for blood in stool, diarrhea and vomiting.   Endocrine: Negative.    Genitourinary: Negative.  Negative for difficulty urinating and dysuria.   Musculoskeletal: Positive for arthralgias (chronic).   Skin: Negative.    Neurological: Positive for headaches. Negative for dizziness and weakness.   Hematological: Negative.    Psychiatric/Behavioral: Negative for agitation and confusion.      All other systems reviewed and are negative.     Personal History     Past Medical History:   Diagnosis Date   • Arthritis    • Breast injury     July 2022-right breast hit with car door   • Current use of steroid medication 2/16/2023   • Disease of thyroid gland    • Diverticulitis    • Edema    • Hyperlipidemia    • Hypertension    • Rheumatoid arthritis (HCC)          Past Surgical History:   Procedure Laterality Date   • ABDOMINAL SURGERY     • HYSTERECTOMY      age 30   • OOPHORECTOMY     • THYROID SURGERY         Family History:  family history includes Breast cancer in her paternal grandmother; COPD in her father; Colon cancer in her paternal grandfather; Heart disease in her father. Otherwise pertinent FHx was reviewed and unremarkable.     Social History:  reports that she has never smoked. She does not have any smokeless tobacco history on file. She reports that she does not currently use alcohol. She reports that she does not use drugs.  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        Medications:  Certolizumab Pegol, FLUoxetine, Lifitegrast, Loratadine, Loratadine-Pseudoephedrine, TiZANidine, Vitamin B-12, atorvastatin, folic  acid, furosemide, levoFLOXacin, levothyroxine, losartan-hydrochlorothiazide, methotrexate, metroNIDAZOLE, ondansetron ODT, potassium chloride, and predniSONE    Allergies   Allergen Reactions   • Guaifenesin & Derivatives    • Gabapentin Hives and Rash   • Pregabalin Hives and Rash       Objective   Objective     Vital Signs:   Temp:  [98.8 °F (37.1 °C)] 98.8 °F (37.1 °C)  Heart Rate:  [88] 88  Resp:  [18] 18  BP: (128)/(94) 128/94    Physical Exam  Constitutional:       General: She is not in acute distress.     Appearance: She is obese.   HENT:      Head: Normocephalic.      Nose: Nose normal. No congestion.      Mouth/Throat:      Mouth: Mucous membranes are moist.   Eyes:      Extraocular Movements: Extraocular movements intact.      Pupils: Pupils are equal, round, and reactive to light.   Cardiovascular:      Rate and Rhythm: Normal rate and regular rhythm.      Pulses: Normal pulses.      Heart sounds: Normal heart sounds. No murmur heard.  Pulmonary:      Effort: No respiratory distress.      Breath sounds: Normal breath sounds. No wheezing or rhonchi.   Abdominal:      General: Bowel sounds are normal. There is distension.      Palpations: Abdomen is soft.      Tenderness: There is abdominal tenderness.   Musculoskeletal:      Cervical back: Normal range of motion. No rigidity.      Right lower le+ Edema present.      Left lower le+ Edema present.   Skin:     General: Skin is warm.      Capillary Refill: Capillary refill takes less than 2 seconds.   Neurological:      General: No focal deficit present.      Mental Status: She is alert and oriented to person, place, and time. Mental status is at baseline.      Motor: No weakness.   Psychiatric:         Mood and Affect: Mood normal.         Behavior: Behavior normal.         Thought Content: Thought content normal.         Judgment: Judgment normal.          Result Review:  I have personally reviewed the results from the time of this admission to  2/16/2023 19:26 EST and agree with these findings:  [x]  Laboratory list / accordion  [x]  Microbiology  [x]  Radiology  [x]  EKG/Telemetry   []  Cardiology/Vascular   []  Pathology  [x]  Old records  []  Other:  Most notable findings include:     LAB RESULTS:      Lab 02/16/23  1627   WBC 8.22   HEMOGLOBIN 15.4   HEMATOCRIT 46.4   PLATELETS 306   NEUTROS ABS 4.90   IMMATURE GRANS (ABS) 0.03   LYMPHS ABS 2.28   MONOS ABS 0.92*   EOS ABS 0.06   MCV 90.1   PROCALCITONIN 0.06   LACTATE 1.2         Lab 02/16/23  1630 02/16/23  1627   SODIUM  --  141   POTASSIUM  --  4.1   CHLORIDE  --  102   CO2  --  27.0   ANION GAP  --  12.0   BUN  --  13   CREATININE 0.90  0.90 0.91   EGFR  --  77.0   GLUCOSE  --  106*   CALCIUM  --  9.2         Lab 02/16/23  1627   TOTAL PROTEIN 7.3   ALBUMIN 4.5   GLOBULIN 2.8   ALT (SGPT) 68*   AST (SGOT) 52*   BILIRUBIN 0.5   ALK PHOS 101   LIPASE 24         Lab 02/16/23  1627   HSTROP T 11*                 Brief Urine Lab Results  (Last result in the past 365 days)      Color   Clarity   Blood   Leuk Est   Nitrite   Protein   CREAT   Urine HCG        02/16/23 1732 Yellow   Clear   Negative   Trace   Negative   Trace               Microbiology Results (last 10 days)     ** No results found for the last 240 hours. **          CT Abdomen Pelvis With Contrast    Result Date: 2/16/2023  CT ABDOMEN PELVIS W CONTRAST Date of Exam: 2/16/2023 5:33 PM EST Indication: LLQ pain. Comparison: CT abdomen and pelvis 10/16/2022 Technique: Axial CT images were obtained of the abdomen and pelvis following the uneventful intravenous administration of 100 mL Isovue-300. Reconstructed coronal and sagittal images were also obtained. Automated exposure control and iterative construction methods were used. Findings: Unremarkable appearance of the lower thorax. There is diffuse hypoattenuation of the liver parenchyma compatible with hepatic steatosis. Unremarkable appearance of the gallbladder and bile ducts. Normal  appearance of the spleen, pancreas, adrenal glands,  kidneys, ureters, and decompressed bladder. The uterus is surgically absent. There is a short 4 cm segment of mid sigmoid colonic wall thickening with peridiverticular fat stranding, compatible with acute diverticulitis. No peridiverticular abscess/fluid or extraluminal air to suggest perforation. The location of diverticulitis is similar to that of prior CT from October 2022. Otherwise grossly unremarkable appearance of the GI tract. No abdominopelvic free fluid. No pneumoperitoneum. Normal appearance of the vasculature. No lymphadenopathy. Unremarkable appearance of the body wall soft tissues. No acute or suspicious bony findings.     Impression: Impression: Acute uncomplicated sigmoid diverticulitis. The location is similar to prior diverticulitis seen on October 2022 CT. Given recurrent diverticulitis in this area, colonoscopy is recommended to exclude possibility of colonic neoplasm in this area, and correlate with colonoscopy history. Electronically Signed: Keon Bunch  2/16/2023 5:52 PM EST  Workstation ID: [id number]          Assessment & Plan   Assessment & Plan       Diverticulitis    HTN (hypertension)    HLD (hyperlipidemia)    Hypothyroidism (acquired)    Rheumatoid arthritis involving multiple sites (HCC)    Other chest pain    BLAKE (dyspnea on exertion)    Current use of steroid medication    Heidy Whitman is a 52 y.o. female with PMH of HTN, hypothyroidism, HLD, RA and diverticulitis (x5) presents to the ED for abdominal pain.    Diverticulitis  -CT abd shows acute uncomplicated sigmoid diverticulitis, location is similar to prior diverticulitis  -Last colonoscopy in 2019 with Dr. Little  -Pain control  -Antiemetics  -Failed outpatient therapy of Augmentin  -Zosyn  -Maintenance fluids  -Has appt outpatient with Dr. Little 3/15, consult colorectal    Chest Pain  -Trop negative  -Reported EKG changes per PCP  -EKG here shows NSR, no acute ST  changes  -Check echo  -NPO at midnight  -Has scheduled stress test 3/6, may need inpatient  -Patient requesting Dr. Elizabeth, consult cardiology     Uncontrolled HTN, HLD  -Continue home statin, losartan/HCTZ, metoprolol  -Hold home lasix for now    RA  -On amitryptiline   -Gets Enbrel and Praluent injects, last was 2/14  -Currently on prednisone taper.  We will need to clarify where patient is in her current taper.    Hypothyroidism  -Continue home synthroid    DVT prophylaxis:  SCDs    CODE STATUS:  FULL       Expected Discharge  2/18/2023      This note has been completed as part of a split-shared workflow.     Signature: Electronically signed by RORO Ellis, 02/16/23, 7:48 PM EST.      Total APC Time: 60 minutes      Attending   Admission Attestation       I have performed an independent face-to-face diagnostic evaluation including performing an independent physical examination as documented here.  The documented plan of care above was reviewed and developed with the advanced practice clinician (APC).      Brief Summary Statement:   Heidy Whitman is a 52 y.o. female with PMHx of HTN, HLD, Rheumatoid arthritis,   Followed by a retina specialist, on systemic steroids, recurrent diverticulitis and family hx of colon cancer. Pt presented to [facility] ED with increased lower abd pain. Her symptoms started a few weeks ago and she was Seen at PCP office on 2/2/23, with increased SOA, edema and chest pain and abd pain. Had increased pain that evening and then CT was completed on 2/3/23. Started augmentin on completed on 2/15. Better for a few days and then worsened. Was also doing clear liquids, soups and jello. On Monday a Tuesday had severe abdominal pain. She reports that last night she had such severe pain after having a BP she had to go lay down. And on multiple occations she was bent over with abdominal pain. Last night had constant severe pain last night. 10/10. Pain greatest in the suprapubic and LLQ pain.  "Pain never completely resolved.   Last BM was last night. No BRBPR. On arrival to the ED CT of abd and pelvis noted   \"There is a short 4 cm segment of mid sigmoid colonic wall thickening with peridiverticular fat stranding, compatible with acute diverticulitis\".  There decision was made to admit patient to hospitalist service for further evaluation and management.  Patient has been followed by Dr. Little in the past for recurrent diverticulitis therefore we will consult Dr. Little in the a.m.  Patient will be treated with IV Zosyn for now.  We will keep patient n.p.o. after midnight.    Patient also reports ongoing Chest pain on/off for the past 3 weeks. BP has been elevated. Dyspnea climbing stairs at work. CP on left side of chest and at times radiates into back and neck and often has associated headache. When BP gets headache. When BP is up her left eye has blurred vision.  Pressure, dull pain. 4/10 at its greatest.   BP was in the 160/90s.  Due to chest pain and dyspnea with exertion we will obtain a 2D echo and consult cardiology in the a.m.      Remainder of detailed HPI is as noted by APC and has been reviewed and/or edited by me for completeness.    Attending Physical Exam:  Temp:  [98.8 °F (37.1 °C)] 98.8 °F (37.1 °C)  Heart Rate:  [88] 88  Resp:  [18] 18  BP: (128)/(94) 128/94    Constitutional: Awake, alert, distressed, diaphoretic   Eyes: PERRLA, sclerae anicteric, no conjunctival injection  HENT: NCAT, mucous membranes moist  Neck: Supple, no thyromegaly, no lymphadenopathy, trachea midline  Respiratory: Clear to auscultation bilaterally, nonlabored respirations   Cardiovascular: RRR, no murmurs, rubs, or gallops, palpable pedal pulses bilaterally  Gastrointestinal: Positive bowel sounds, soft, left lower quadrant and suprapubic tenderness  Musculoskeletal: trace edema, no clubbing or cyanosis to extremities  Psychiatric: Anxious affect, cooperative  Neurologic: Oriented x 3, strength symmetric in all " extremities, Cranial Nerves grossly intact to confrontation, speech clear  Skin: No rashes      Brief Assessment/Plan :  See detailed assessment and plan developed with APC which I have reviewed and/or edited for completeness.    Total time spent: 30  minutes  Time spent includes time reviewing chart, face-to-face time, counseling patient/family/caregiver, ordering medications/tests/procedures, communicating with other health care professionals, documenting clinical information in the electronic health record, and coordination of care.    Aury Worthington,   02/16/23

## 2023-02-18 LAB
ANION GAP SERPL CALCULATED.3IONS-SCNC: 12 MMOL/L (ref 5–15)
BUN SERPL-MCNC: 14 MG/DL (ref 6–20)
BUN/CREAT SERPL: 19.2 (ref 7–25)
CALCIUM SPEC-SCNC: 9 MG/DL (ref 8.6–10.5)
CHLORIDE SERPL-SCNC: 98 MMOL/L (ref 98–107)
CO2 SERPL-SCNC: 28 MMOL/L (ref 22–29)
CREAT SERPL-MCNC: 0.73 MG/DL (ref 0.57–1)
DEPRECATED RDW RBC AUTO: 44 FL (ref 37–54)
EGFRCR SERPLBLD CKD-EPI 2021: 100.3 ML/MIN/1.73
ERYTHROCYTE [DISTWIDTH] IN BLOOD BY AUTOMATED COUNT: 13 % (ref 12.3–15.4)
GEN 5 2HR TROPONIN T REFLEX: 11 NG/L
GLUCOSE SERPL-MCNC: 83 MG/DL (ref 65–99)
HCT VFR BLD AUTO: 45.6 % (ref 34–46.6)
HGB BLD-MCNC: 14.9 G/DL (ref 12–15.9)
MCH RBC QN AUTO: 29.9 PG (ref 26.6–33)
MCHC RBC AUTO-ENTMCNC: 32.7 G/DL (ref 31.5–35.7)
MCV RBC AUTO: 91.4 FL (ref 79–97)
PLATELET # BLD AUTO: 279 10*3/MM3 (ref 140–450)
PMV BLD AUTO: 10.3 FL (ref 6–12)
POTASSIUM SERPL-SCNC: 3.3 MMOL/L (ref 3.5–5.2)
RBC # BLD AUTO: 4.99 10*6/MM3 (ref 3.77–5.28)
SODIUM SERPL-SCNC: 138 MMOL/L (ref 136–145)
TROPONIN T DELTA: 4 NG/L
TROPONIN T SERPL HS-MCNC: 7 NG/L
WBC NRBC COR # BLD: 5.94 10*3/MM3 (ref 3.4–10.8)

## 2023-02-18 PROCEDURE — 63710000001 DIPHENHYDRAMINE PER 50 MG: Performed by: NURSE PRACTITIONER

## 2023-02-18 PROCEDURE — 99232 SBSQ HOSP IP/OBS MODERATE 35: CPT | Performed by: FAMILY MEDICINE

## 2023-02-18 PROCEDURE — 25010000002 PIPERACILLIN SOD-TAZOBACTAM PER 1 G

## 2023-02-18 PROCEDURE — 84484 ASSAY OF TROPONIN QUANT: CPT | Performed by: INTERNAL MEDICINE

## 2023-02-18 PROCEDURE — G0378 HOSPITAL OBSERVATION PER HR: HCPCS

## 2023-02-18 PROCEDURE — 93005 ELECTROCARDIOGRAM TRACING: CPT | Performed by: INTERNAL MEDICINE

## 2023-02-18 PROCEDURE — 80048 BASIC METABOLIC PNL TOTAL CA: CPT | Performed by: FAMILY MEDICINE

## 2023-02-18 PROCEDURE — 93010 ELECTROCARDIOGRAM REPORT: CPT | Performed by: INTERNAL MEDICINE

## 2023-02-18 PROCEDURE — 99214 OFFICE O/P EST MOD 30 MIN: CPT | Performed by: INTERNAL MEDICINE

## 2023-02-18 PROCEDURE — 85027 COMPLETE CBC AUTOMATED: CPT | Performed by: FAMILY MEDICINE

## 2023-02-18 RX ORDER — POTASSIUM CHLORIDE 750 MG/1
40 CAPSULE, EXTENDED RELEASE ORAL ONCE
Status: COMPLETED | OUTPATIENT
Start: 2023-02-18 | End: 2023-02-18

## 2023-02-18 RX ORDER — ACETAMINOPHEN 10 MG/ML
1000 INJECTION, SOLUTION INTRAVENOUS ONCE
Status: DISCONTINUED | OUTPATIENT
Start: 2023-02-18 | End: 2023-02-20

## 2023-02-18 RX ORDER — SIMETHICONE 80 MG
80 TABLET,CHEWABLE ORAL 4 TIMES DAILY PRN
Status: DISCONTINUED | OUTPATIENT
Start: 2023-02-18 | End: 2023-02-21 | Stop reason: HOSPADM

## 2023-02-18 RX ORDER — TRAMADOL HYDROCHLORIDE 50 MG/1
50 TABLET ORAL EVERY 6 HOURS PRN
Status: DISCONTINUED | OUTPATIENT
Start: 2023-02-18 | End: 2023-02-21 | Stop reason: HOSPADM

## 2023-02-18 RX ADMIN — HYDROXYZINE HYDROCHLORIDE 25 MG: 25 TABLET ORAL at 18:28

## 2023-02-18 RX ADMIN — Medication 10 ML: at 07:53

## 2023-02-18 RX ADMIN — ACETAMINOPHEN 1000 MG: 500 TABLET ORAL at 18:28

## 2023-02-18 RX ADMIN — METOPROLOL TARTRATE 25 MG: 25 TABLET, FILM COATED ORAL at 07:51

## 2023-02-18 RX ADMIN — FLUOXETINE 40 MG: 20 CAPSULE ORAL at 07:52

## 2023-02-18 RX ADMIN — HYDROXYZINE HYDROCHLORIDE 25 MG: 25 TABLET ORAL at 08:02

## 2023-02-18 RX ADMIN — CYANOCOBALAMIN TAB 1000 MCG 1000 MCG: 1000 TAB at 07:52

## 2023-02-18 RX ADMIN — TAZOBACTAM SODIUM AND PIPERACILLIN SODIUM 3.38 G: 375; 3 INJECTION, SOLUTION INTRAVENOUS at 04:10

## 2023-02-18 RX ADMIN — DIPHENHYDRAMINE HYDROCHLORIDE 25 MG: 25 CAPSULE ORAL at 20:13

## 2023-02-18 RX ADMIN — SIMETHICONE CHEW TAB 80 MG 80 MG: 80 TABLET ORAL at 15:54

## 2023-02-18 RX ADMIN — AMITRIPTYLINE HYDROCHLORIDE 10 MG: 10 TABLET, FILM COATED ORAL at 20:13

## 2023-02-18 RX ADMIN — TAZOBACTAM SODIUM AND PIPERACILLIN SODIUM 3.38 G: 375; 3 INJECTION, SOLUTION INTRAVENOUS at 20:12

## 2023-02-18 RX ADMIN — TRAMADOL HYDROCHLORIDE 50 MG: 50 TABLET, COATED ORAL at 09:34

## 2023-02-18 RX ADMIN — TAZOBACTAM SODIUM AND PIPERACILLIN SODIUM 3.38 G: 375; 3 INJECTION, SOLUTION INTRAVENOUS at 12:26

## 2023-02-18 RX ADMIN — TIZANIDINE 4 MG: 4 TABLET ORAL at 20:12

## 2023-02-18 RX ADMIN — LOSARTAN POTASSIUM 100 MG: 50 TABLET, FILM COATED ORAL at 07:52

## 2023-02-18 RX ADMIN — HYDROCHLOROTHIAZIDE 25 MG: 25 TABLET ORAL at 07:52

## 2023-02-18 RX ADMIN — POTASSIUM CHLORIDE 40 MEQ: 750 CAPSULE, EXTENDED RELEASE ORAL at 06:42

## 2023-02-18 RX ADMIN — ACETAMINOPHEN 1000 MG: 500 TABLET ORAL at 03:01

## 2023-02-18 RX ADMIN — LEVOTHYROXINE SODIUM 150 MCG: 150 TABLET ORAL at 05:46

## 2023-02-18 RX ADMIN — TRAMADOL HYDROCHLORIDE 50 MG: 50 TABLET, COATED ORAL at 15:46

## 2023-02-18 NOTE — PROGRESS NOTES
"[facility] ([facility]        Length of stay: 0 days    Subjective: Ms. lerner is doing fair today.  She did have some chest pain earlier this morning.  She has been followed by cardiology.    She is also complaining of some abdominal pain, this increased in severity immediately prior to passage of some diarrhea.  Once her bowel movement was over, she did experience some relief.  Still not back to normal.    Physical Exam:  Blood pressure 115/71, pulse 62, temperature 97.8 °F (36.6 °C), temperature source Oral, resp. rate 18, height 167.6 cm (66\"), weight 94.9 kg (209 lb 3.2 oz), SpO2 99 %.    Abd: Soft, tender to palpation left lower quadrant and suprapubically.  No peritoneal signs.  Nondistended.    Labs past 24 hours:  Lab Results (last 24 hours)     Procedure Component Value Units Date/Time    High Sensitivity Troponin T 2Hr [[id number]]  (Abnormal) Collected: 02/18/23 0937    Specimen: Blood Updated: 02/18/23 1038     HS Troponin T 11 ng/L      Troponin T Delta 4 ng/L     Narrative:      High Sensitive Troponin T Reference Range:  <10.0 ng/L- Negative Female for AMI  <15.0 ng/L- Negative Male for AMI  >=10 - Abnormal Female indicating possible myocardial injury.  >=15 - Abnormal Male indicating possible myocardial injury.   Clinicians would have to utilize clinical acumen, EKG, Troponin, and serial changes to determine if it is an Acute Myocardial Infarction or myocardial injury due to an underlying chronic condition.         High Sensitivity Troponin T [[id number]]  (Normal) Collected: 02/18/23 0730    Specimen: Blood Updated: 02/18/23 0814     HS Troponin T 7 ng/L     Narrative:      High Sensitive Troponin T Reference Range:  <10.0 ng/L- Negative Female for AMI  <15.0 ng/L- Negative Male for AMI  >=10 - Abnormal Female indicating possible myocardial injury.  >=15 - Abnormal Male indicating possible myocardial injury.   Clinicians would have to utilize clinical acumen, EKG, " Troponin, and serial changes to determine if it is an Acute Myocardial Infarction or myocardial injury due to an underlying chronic condition.         Basic Metabolic Panel [[id number]]  (Abnormal) Collected: 02/18/23 0256    Specimen: Blood Updated: 02/18/23 0547     Glucose 83 mg/dL      BUN 14 mg/dL      Creatinine 0.73 mg/dL      Sodium 138 mmol/L      Potassium 3.3 mmol/L      Comment: Slight hemolysis detected by analyzer. Results may be affected.        Chloride 98 mmol/L      CO2 28.0 mmol/L      Calcium 9.0 mg/dL      BUN/Creatinine Ratio 19.2     Anion Gap 12.0 mmol/L      eGFR 100.3 mL/min/1.73     Narrative:      GFR Normal >60  Chronic Kidney Disease <60  Kidney Failure <15      CBC (No Diff) [[id number]]  (Normal) Collected: 02/18/23 0256    Specimen: Blood Updated: 02/18/23 0500     WBC 5.94 10*3/mm3      RBC 4.99 10*6/mm3      Hemoglobin 14.9 g/dL      Hematocrit 45.6 %      MCV 91.4 fL      MCH 29.9 pg      MCHC 32.7 g/dL      RDW 13.0 %      RDW-SD 44.0 fl      MPV 10.3 fL      Platelets 279 10*3/mm3     Blood Culture - Blood, Hand, Left [[id number]]  (Normal) Collected: 02/16/23 1940    Specimen: Blood from Hand, Left Updated: 02/17/23 2015     Blood Culture No growth at 24 hours    Blood Culture - Blood, Arm, Right [[id number]]  (Normal) Collected: 02/16/23 1625    Specimen: Blood from Arm, Right Updated: 02/17/23 1645     Blood Culture No growth at 24 hours          I/O last shift:  I/O this shift:  In: 360 [P.O.:360]  Out: -        Pathology:  * Cannot find OR log *.    Assessment and Plan:  52-year-old female with uncomplicated diverticulitis, no improvement after failed outpatient management.  Multiple bouts over the recent years.  Still has not shown significant improvement despite almost 48 hours of IV antibiotics    Plan  Continue her current regiment with antibiotics  Do not advance past clear liquid diet yet  If she still shows no improvement by tomorrow, will consider reimaging with a CT  scan  Certainly the longer it takes her to improve, I think this increases her need for outpatient antibiotics, will make that decision based on her appearance tomorrow.    Isidro Stanley MD  [facility]  02/18/23  13:36 EST

## 2023-02-18 NOTE — PROGRESS NOTES
[facility]  IP Progress Note      Chief Complaint/Reason for service: Chest pain    Subjective   Subjective: The patient denies chest pain.  She tells me she had a reaction to Dilaudid yesterday.  Also complaining of some lower abdominal pain.  No nausea or vomiting.    Past medical, surgical, social and family history reviewed in the patient's electronic medical record.    Objective     Vital Sign Min/Max for last 24 hours  Temp  Min: 97.9 °F (36.6 °C)  Max: 98.3 °F (36.8 °C)   BP  Min: 106/63  Max: 121/68   Pulse  Min: 59  Max: 73   Resp  Min: 8  Max: 18   SpO2  Min: 97 %  Max: 100 %   Flow (L/min)  Min: 2  Max: 2      Intake/Output Summary (Last 24 hours) at 2/18/2023 0551  Last data filed at 2/18/2023 0410  Gross per 24 hour   Intake 100 ml   Output --   Net 100 ml             Current Facility-Administered Medications:   •  acetaminophen (TYLENOL) tablet 1,000 mg, 1,000 mg, Oral, Q6H PRN, Annalisa Wood DO, 1,000 mg at 02/18/23 0301  •  amitriptyline (ELAVIL) tablet 10 mg, 10 mg, Oral, Nightly, [employer], Lolis, APRN, 10 mg at 02/17/23 2105  •  diphenhydrAMINE (BENADRYL) capsule 25 mg, 25 mg, Oral, Q6H PRN, Darell, Bethany, APRN, 25 mg at 02/17/23 0924  •  FLUoxetine (PROzac) capsule 40 mg, 40 mg, Oral, Daily, [employer], Lolis, APRN, 40 mg at 02/17/23 0924  •  losartan (COZAAR) tablet 100 mg, 100 mg, Oral, Q24H, 100 mg at 02/17/23 0924 **AND** hydroCHLOROthiazide (HYDRODIURIL) tablet 25 mg, 25 mg, Oral, Q24H, [employer], Lolis, APRN, 25 mg at 02/17/23 0924  •  HYDROcodone-acetaminophen (NORCO) 5-325 MG per tablet 1 tablet, 1 tablet, Oral, Q6H PRN, Annalisa Wood DO  •  hydrOXYzine (ATARAX) tablet 25 mg, 25 mg, Oral, TID PRN, Annalisa Wood DO, 25 mg at 02/17/23 1151  •  levothyroxine (SYNTHROID, LEVOTHROID) tablet 150 mcg, 150 mcg, Oral, Q AM, Annalisa Wood DO, 150 mcg at 02/18/23 0546  •  metoprolol tartrate (LOPRESSOR) tablet 25 mg, 25 mg, Oral, Daily, Paolo,  Lolis, APRN, 25 mg at 02/16/23 2225  •  ondansetron (ZOFRAN) tablet 4 mg, 4 mg, Oral, Q6H PRN **OR** ondansetron (ZOFRAN) injection 4 mg, 4 mg, Intravenous, Q6H PRN, [employer], Lolis, APRN  •  piperacillin-tazobactam (ZOSYN) 3.375 g in iso-osmotic dextrose 50 ml (premix), 3.375 g, Intravenous, Q8H, [employer], Lolis, APRN, 3.375 g at 02/18/23 0410  •  sodium chloride 0.9 % flush 10 mL, 10 mL, Intravenous, PRN, Paulo Glover MD  •  sodium chloride 0.9 % flush 10 mL, 10 mL, Intravenous, Q12H, [employer], Lolis, APRN, 10 mL at 02/17/23 2135  •  sodium chloride 0.9 % flush 10 mL, 10 mL, Intravenous, PRN, [employer], Lolis, APRN  •  sodium chloride 0.9 % infusion 40 mL, 40 mL, Intravenous, PRN, [employer], Lolis, APRN  •  tiZANidine (ZANAFLEX) tablet 4 mg, 4 mg, Oral, Nightly PRN, [employer], Lolis, APRN, 4 mg at 02/17/23 2117  •  vitamin B-12 (CYANOCOBALAMIN) tablet 1,000 mcg, 1,000 mcg, Oral, Daily, [employer], Lolis, APRN, 1,000 mcg at 02/17/23 0924    Physical Exam: General pleasant overweight female in bed at a 60 degree angle not dyspneic not tachypneic current heart rate 68        HEENT: No JVP       Respiratory: Clear anteriorly       Cardiovascular: Regular rate and rhythm without murmur gallop or click and no edema             Lower Extremities: No edema       Neuro: Facial expressions are symmetrical moves all 4 extremities       Skin: Warm and dry       Psych: Pleasant affect    Results Review: Intake and output are essentially even since admission.  Blood pressures 101 228 systolic.  Potassium is low at 3.3    Radiology Results:  Imaging Results (Last 72 Hours)     Procedure Component Value Units Date/Time    XR Chest 1 View [[id number]] Collected: 02/16/23 2029     Updated: 02/16/23 2032    Narrative:      XR CHEST 1 VW    Date of Exam: 2/16/2023 8:00 PM EST    Indication: chest pain.    Comparison: Chest x-ray 8/16/2016    Findings:  Normal cardiomediastinal silhouette. The lungs are clear. No pleural effusion or pneumothorax. No  acute osseous findings.      Impression:      Impression:  No acute cardiopulmonary findings.       Electronically Signed: Keon Bunch    2/16/2023 8:30 PM EST    Workstation ID: [id number]    CT Abdomen Pelvis With Contrast [[id number]] Collected: 02/16/23 1742     Updated: 02/16/23 1754    Narrative:      CT ABDOMEN PELVIS W CONTRAST    Date of Exam: 2/16/2023 5:33 PM EST    Indication: LLQ pain.    Comparison: CT abdomen and pelvis 10/16/2022    Technique: Axial CT images were obtained of the abdomen and pelvis following the uneventful intravenous administration of 100 mL Isovue-300. Reconstructed coronal and sagittal images were also obtained. Automated exposure control and iterative   construction methods were used.    Findings:  Unremarkable appearance of the lower thorax. There is diffuse hypoattenuation of the liver parenchyma compatible with hepatic steatosis. Unremarkable appearance of the gallbladder and bile ducts. Normal appearance of the spleen, pancreas, adrenal glands,   kidneys, ureters, and decompressed bladder. The uterus is surgically absent.    There is a short 4 cm segment of mid sigmoid colonic wall thickening with peridiverticular fat stranding, compatible with acute diverticulitis. No peridiverticular abscess/fluid or extraluminal air to suggest perforation. The location of diverticulitis   is similar to that of prior CT from October 2022. Otherwise grossly unremarkable appearance of the GI tract. No abdominopelvic free fluid. No pneumoperitoneum.    Normal appearance of the vasculature. No lymphadenopathy. Unremarkable appearance of the body wall soft tissues. No acute or suspicious bony findings.      Impression:      Impression:  Acute uncomplicated sigmoid diverticulitis. The location is similar to prior diverticulitis seen on October 2022 CT. Given recurrent diverticulitis in this area, colonoscopy is recommended to exclude possibility of colonic neoplasm in this area, and   correlate  with colonoscopy history.    Electronically Signed: Keon Bunch    2/16/2023 5:52 PM EST    Workstation ID: [id number]          EKG: Sinus rhythm nonspecific ST abnormalities laterally unchanged from previous EKG 2016    ECHO: EF 66 to 70%    Tele: Sinus rhythm    Assessment   Assessment/Plan: Chest pain-patient had no further episodes of chest pain.  She is on the schedule for stress PET on Monday  2 patient complaining of significant lower abdominal to pelvic pain.  She has had reactions now to pain medications.  I will give her a dose of IV Tylenol and see how she does with that    Randal Carmen MD  02/18/23   This is an addendum to the earlier note.  The nurse called me stating the patient was having chest pain.  I ordered a stat at bedtime troponin which was normal at 7 and the EKG showed no signs of ischemia.  The patient has recurrent chest pain we will place Nitropaste on her  05:51 EST

## 2023-02-18 NOTE — PROGRESS NOTES
[facility] Medicine Services  PROGRESS NOTE    Patient Name: Heidy Whitman  : 1970  MRN: [id number]    Date of Admission: 2023  Primary Care Physician: Itzel Long MD    Subjective   Subjective     CC:  F/U recurrent diverticulitis    HPI:  Patient seen and examined. Had some CP this AM, Cardiology aware. Having LLQ pain this AM, not tolerating pain meds due to itching     ROS:  Gen- No fevers, chills  CV- +chest pain, no palpitations  Resp- No cough, dyspnea  GI- No N/V/D, +LLQ abd pain    Objective   Objective     Vital Signs:   Temp:  [97.8 °F (36.6 °C)-98.3 °F (36.8 °C)] 97.8 °F (36.6 °C)  Heart Rate:  [59-64] 62  Resp:  [8-18] 18  BP: (106-121)/(63-74) 115/71  Flow (L/min):  [2] 2     Physical Exam:  Constitutional: No acute distress, awake, alert  HENT: NCAT, mucous membranes moist  Respiratory: Clear to auscultation bilaterally, respiratory effort normal   Cardiovascular: RRR, no murmurs, rubs, or gallops  Gastrointestinal: Positive bowel sounds, soft, non-distended, +TTP LLQ, no rigidity, rebound or guarding   Musculoskeletal: No bilateral ankle edema  Psychiatric: Appropriate affect, cooperative  Neurologic: Oriented x 3, no focal deficits, speech clear  Skin: No rashes    Results Reviewed:  LAB RESULTS:      Lab 23  0256 23  0344 23  1858 23  1627   WBC 5.94 6.73  --  8.22   HEMOGLOBIN 14.9 13.6  --  15.4   HEMATOCRIT 45.6 41.0  --  46.4   PLATELETS 279 256  --  306   NEUTROS ABS  --  3.36  --  4.90   IMMATURE GRANS (ABS)  --  0.02  --  0.03   LYMPHS ABS  --  2.46  --  2.28   MONOS ABS  --  0.76  --  0.92*   EOS ABS  --  0.10  --  0.06   MCV 91.4 91.5  --  90.1   SED RATE  --   --   --  36*   CRP  --   --  1.40*  --    PROCALCITONIN  --   --   --  0.06   LACTATE  --   --   --  1.2         Lab 23  0256 23  0344 23  1630 23  1627   SODIUM 138 138  --  141   POTASSIUM 3.3* 4.2  --  4.1   CHLORIDE 98 101  --  102    CO2 28.0 27.0  --  27.0   ANION GAP 12.0 10.0  --  12.0   BUN 14 14  --  13   CREATININE 0.73 0.77 0.90  0.90 0.91   EGFR 100.3 94.1  --  77.0   GLUCOSE 83 93  --  106*   CALCIUM 9.0 9.0  --  9.2   MAGNESIUM  --  2.0  --   --    HEMOGLOBIN A1C  --  5.90*  --   --          Lab 02/16/23  1627   TOTAL PROTEIN 7.3   ALBUMIN 4.5   GLOBULIN 2.8   ALT (SGPT) 68*   AST (SGOT) 52*   BILIRUBIN 0.5   ALK PHOS 101   LIPASE 24         Lab 02/18/23  0937 02/18/23  0730 02/17/23  0344 02/16/23  1858 02/16/23  1627   PROBNP  --   --   --  34.6  --    HSTROP T 11* 7 <6 7 11*         Lab 02/17/23  0344   CHOLESTEROL 144   LDL CHOL 78   HDL CHOL 42   TRIGLYCERIDES 137             Brief Urine Lab Results  (Last result in the past 365 days)      Color   Clarity   Blood   Leuk Est   Nitrite   Protein   CREAT   Urine HCG        02/16/23 1732 Yellow   Clear   Negative   Trace   Negative   Trace                 Microbiology Results Abnormal     Procedure Component Value - Date/Time    Blood Culture - Blood, Hand, Left [[id number]]  (Normal) Collected: 02/16/23 1940    Lab Status: Preliminary result Specimen: Blood from Hand, Left Updated: 02/17/23 2015     Blood Culture No growth at 24 hours    Blood Culture - Blood, Arm, Right [[id number]]  (Normal) Collected: 02/16/23 1625    Lab Status: Preliminary result Specimen: Blood from Arm, Right Updated: 02/17/23 1645     Blood Culture No growth at 24 hours          Adult Transthoracic Echo Complete w/ Color, Spectral and Contrast if necessary per protocol    Result Date: 2/17/2023  •  Left ventricular ejection fraction appears to be 66 - 70%. •  Estimated right ventricular systolic pressure from tricuspid regurgitation is normal (<35 mmHg).     CT Abdomen Pelvis With Contrast    Result Date: 2/16/2023  CT ABDOMEN PELVIS W CONTRAST Date of Exam: 2/16/2023 5:33 PM EST Indication: LLQ pain. Comparison: CT abdomen and pelvis 10/16/2022 Technique: Axial CT images were obtained of the abdomen and  pelvis following the uneventful intravenous administration of 100 mL Isovue-300. Reconstructed coronal and sagittal images were also obtained. Automated exposure control and iterative construction methods were used. Findings: Unremarkable appearance of the lower thorax. There is diffuse hypoattenuation of the liver parenchyma compatible with hepatic steatosis. Unremarkable appearance of the gallbladder and bile ducts. Normal appearance of the spleen, pancreas, adrenal glands,  kidneys, ureters, and decompressed bladder. The uterus is surgically absent. There is a short 4 cm segment of mid sigmoid colonic wall thickening with peridiverticular fat stranding, compatible with acute diverticulitis. No peridiverticular abscess/fluid or extraluminal air to suggest perforation. The location of diverticulitis is similar to that of prior CT from October 2022. Otherwise grossly unremarkable appearance of the GI tract. No abdominopelvic free fluid. No pneumoperitoneum. Normal appearance of the vasculature. No lymphadenopathy. Unremarkable appearance of the body wall soft tissues. No acute or suspicious bony findings.     Impression: Impression: Acute uncomplicated sigmoid diverticulitis. The location is similar to prior diverticulitis seen on October 2022 CT. Given recurrent diverticulitis in this area, colonoscopy is recommended to exclude possibility of colonic neoplasm in this area, and correlate with colonoscopy history. Electronically Signed: [email or web address]  2/16/2023 5:52 PM EST  Workstation ID: [id number]    XR Chest 1 View    Result Date: 2/16/2023  XR CHEST 1 VW Date of Exam: 2/16/2023 8:00 PM EST Indication: chest pain. Comparison: Chest x-ray 8/16/2016 Findings: Normal cardiomediastinal silhouette. The lungs are clear. No pleural effusion or pneumothorax. No acute osseous findings.     Impression: Impression: No acute cardiopulmonary findings.  Electronically Signed: [email or web address]  2/16/2023 8:30 PM EST  Workstation  ID: [id number]      Results for orders placed during the hospital encounter of 02/16/23    Adult Transthoracic Echo Complete w/ Color, Spectral and Contrast if necessary per protocol    Interpretation Summary  •  Left ventricular ejection fraction appears to be 66 - 70%.  •  Estimated right ventricular systolic pressure from tricuspid regurgitation is normal (<35 mmHg).      I have reviewed the medications:  Scheduled Meds:acetaminophen, 1,000 mg, Intravenous, Once  amitriptyline, 10 mg, Oral, Nightly  FLUoxetine, 40 mg, Oral, Daily  losartan, 100 mg, Oral, Q24H   And  hydroCHLOROthiazide, 25 mg, Oral, Q24H  levothyroxine, 150 mcg, Oral, Q AM  metoprolol tartrate, 25 mg, Oral, Daily  piperacillin-tazobactam, 3.375 g, Intravenous, Q8H  sodium chloride, 10 mL, Intravenous, Q12H  vitamin B-12, 1,000 mcg, Oral, Daily      Continuous Infusions:   PRN Meds:.•  acetaminophen  •  Calcium Replacement - Initiate Nurse / BPA Driven Protocol  •  diphenhydrAMINE  •  hydrOXYzine  •  Magnesium Standard Dose Replacement - Initiate Nurse / BPA Driven Protocol  •  ondansetron **OR** ondansetron  •  Phosphorus Replacement - Initiate Nurse / BPA Driven Protocol  •  Potassium Replacement - Initiate Nurse / BPA Driven Protocol  •  simethicone  •  sodium chloride  •  sodium chloride  •  sodium chloride  •  tiZANidine  •  traMADol    Assessment & Plan   Assessment & Plan     Active Hospital Problems    Diagnosis  POA   • **Diverticulitis [K57.92]  Yes   • HTN (hypertension) [I10]  Unknown   • HLD (hyperlipidemia) [E78.5]  Unknown   • Hypothyroidism (acquired) [E03.9]  Unknown   • Rheumatoid arthritis involving multiple sites (HCC) [M06.9]  Unknown   • Other chest pain [R07.89]  Unknown   • BLAKE (dyspnea on exertion) [R06.09]  Unknown   • Current use of steroid medication [Z79.52]  Not Applicable      Resolved Hospital Problems   No resolved problems to display.        Brief Hospital Course to date:  Heidy Whitman is a 52 y.o. female with  PMH of HTN, hypothyroidism, HLD, RA and diverticulitis (x5) presents to the ED for abdominal pain.    This patient's problems and plans were partially entered by my partner and updated as appropriate by me 02/18/23.     Diverticulitis  -CT abd shows acute uncomplicated sigmoid diverticulitis, location is similar to prior diverticulitis  -Last colonoscopy in 2019 with Dr. Little  -Pain control has been an issue due to itching with narcotics. Trial of Ultram and premedicating with Atarax   -Antiemetics  -Failed outpatient therapy of Augmentin  -Continue IV Zosyn  -S/P Maintenance fluids  -Colorectal Surgery following, plan for outpatient elective colectomy  -tolerating clear liquids, diet advancement per CRS   -Plan to DC on Cipro/Flagyl      Chest Pain  -Echo reviewed, EF WNL  -Cardiology following, plan for Stress Test Monday  -Episode of CP this AM, initial trop normal, repeat elevated. No acute EKG changes. Cards aware.      Uncontrolled HTN, HLD  -Continue home statin, losartan/HCTZ, metoprolol  -Holding home lasix      RA  -On amitryptiline   -Gets Enbrel and Praluent injects, last was 2/14  -Not taking steroids currently   -Follows with Dr Adarsh Patton      Hypothyroidism  -Continue home synthroid    Hypokalemia  -Replace per protocol     Expected Discharge Location and Transportation: Home   Expected Discharge   Expected Discharge Date and Time     Expected Discharge Date Expected Discharge Time    Feb 21, 2023            DVT prophylaxis:  Mechanical DVT prophylaxis orders are present.          CODE STATUS:   Code Status and Medical Interventions:   Ordered at: 02/16/23 1954     Level Of Support Discussed With:    Patient     Code Status (Patient has no pulse and is not breathing):    CPR (Attempt to Resuscitate)     Medical Interventions (Patient has pulse or is breathing):    Full Support       Annalisa Wood, DO  02/18/23

## 2023-02-18 NOTE — PLAN OF CARE
Problem: Adult Inpatient Plan of Care  Goal: Absence of Hospital-Acquired Illness or Injury  Intervention: Identify and Manage Fall Risk  Recent Flowsheet Documentation  Taken 2/18/2023 0000 by Shavon Baca RN  Safety Promotion/Fall Prevention:   clutter free environment maintained   assistive device/personal items within reach   safety round/check completed  Taken 2/17/2023 2200 by Shavon Baca RN  Safety Promotion/Fall Prevention:   assistive device/personal items within reach   clutter free environment maintained   safety round/check completed  Taken 2/17/2023 2055 by Shavon Baca RN  Safety Promotion/Fall Prevention:   assistive device/personal items within reach   clutter free environment maintained   safety round/check completed   Goal Outcome Evaluation:  Plan of Care Reviewed With: patient           Outcome Evaluation: oriented. c/o maricruz mckinney the beginning of the shift, no c/p any more pain after tylenol was given

## 2023-02-19 ENCOUNTER — APPOINTMENT (OUTPATIENT)
Dept: CT IMAGING | Facility: HOSPITAL | Age: 53
End: 2023-02-19
Payer: COMMERCIAL

## 2023-02-19 LAB
ANION GAP SERPL CALCULATED.3IONS-SCNC: 9 MMOL/L (ref 5–15)
BUN SERPL-MCNC: 10 MG/DL (ref 6–20)
BUN/CREAT SERPL: 11.5 (ref 7–25)
C DIFF TOX GENS STL QL NAA+PROBE: NOT DETECTED
CALCIUM SPEC-SCNC: 8.6 MG/DL (ref 8.6–10.5)
CHLORIDE SERPL-SCNC: 102 MMOL/L (ref 98–107)
CO2 SERPL-SCNC: 31 MMOL/L (ref 22–29)
CREAT SERPL-MCNC: 0.87 MG/DL (ref 0.57–1)
DEPRECATED RDW RBC AUTO: 42.3 FL (ref 37–54)
EGFRCR SERPLBLD CKD-EPI 2021: 81.3 ML/MIN/1.73
ERYTHROCYTE [DISTWIDTH] IN BLOOD BY AUTOMATED COUNT: 12.8 % (ref 12.3–15.4)
GLUCOSE SERPL-MCNC: 89 MG/DL (ref 65–99)
HCT VFR BLD AUTO: 39.8 % (ref 34–46.6)
HGB BLD-MCNC: 13.5 G/DL (ref 12–15.9)
MAGNESIUM SERPL-MCNC: 2 MG/DL (ref 1.6–2.6)
MCH RBC QN AUTO: 30.7 PG (ref 26.6–33)
MCHC RBC AUTO-ENTMCNC: 33.9 G/DL (ref 31.5–35.7)
MCV RBC AUTO: 90.5 FL (ref 79–97)
PLATELET # BLD AUTO: 246 10*3/MM3 (ref 140–450)
PMV BLD AUTO: 10.4 FL (ref 6–12)
POTASSIUM SERPL-SCNC: 3.2 MMOL/L (ref 3.5–5.2)
POTASSIUM SERPL-SCNC: 3.6 MMOL/L (ref 3.5–5.2)
RBC # BLD AUTO: 4.4 10*6/MM3 (ref 3.77–5.28)
SODIUM SERPL-SCNC: 142 MMOL/L (ref 136–145)
WBC NRBC COR # BLD: 4.47 10*3/MM3 (ref 3.4–10.8)

## 2023-02-19 PROCEDURE — 0 DIATRIZOATE MEGLUMINE & SODIUM PER 1 ML

## 2023-02-19 PROCEDURE — 74177 CT ABD & PELVIS W/CONTRAST: CPT

## 2023-02-19 PROCEDURE — 96376 TX/PRO/DX INJ SAME DRUG ADON: CPT

## 2023-02-19 PROCEDURE — 25010000002 ONDANSETRON PER 1 MG

## 2023-02-19 PROCEDURE — 25010000002 IOPAMIDOL 61 % SOLUTION: Performed by: FAMILY MEDICINE

## 2023-02-19 PROCEDURE — 85027 COMPLETE CBC AUTOMATED: CPT | Performed by: FAMILY MEDICINE

## 2023-02-19 PROCEDURE — 80048 BASIC METABOLIC PNL TOTAL CA: CPT | Performed by: FAMILY MEDICINE

## 2023-02-19 PROCEDURE — 99232 SBSQ HOSP IP/OBS MODERATE 35: CPT | Performed by: FAMILY MEDICINE

## 2023-02-19 PROCEDURE — 25010000002 PIPERACILLIN SOD-TAZOBACTAM PER 1 G

## 2023-02-19 PROCEDURE — 63710000001 DIPHENHYDRAMINE PER 50 MG: Performed by: NURSE PRACTITIONER

## 2023-02-19 PROCEDURE — G0378 HOSPITAL OBSERVATION PER HR: HCPCS

## 2023-02-19 PROCEDURE — 84132 ASSAY OF SERUM POTASSIUM: CPT | Performed by: FAMILY MEDICINE

## 2023-02-19 PROCEDURE — 99214 OFFICE O/P EST MOD 30 MIN: CPT | Performed by: INTERNAL MEDICINE

## 2023-02-19 PROCEDURE — 87493 C DIFF AMPLIFIED PROBE: CPT | Performed by: FAMILY MEDICINE

## 2023-02-19 PROCEDURE — 83735 ASSAY OF MAGNESIUM: CPT | Performed by: FAMILY MEDICINE

## 2023-02-19 PROCEDURE — 25010000002 ERTAPENEM PER 500 MG: Performed by: FAMILY MEDICINE

## 2023-02-19 RX ORDER — POTASSIUM CHLORIDE 750 MG/1
40 CAPSULE, EXTENDED RELEASE ORAL EVERY 4 HOURS
Status: COMPLETED | OUTPATIENT
Start: 2023-02-19 | End: 2023-02-19

## 2023-02-19 RX ORDER — HYDROCHLOROTHIAZIDE 25 MG/1
25 TABLET ORAL
Status: CANCELLED | OUTPATIENT
Start: 2023-02-19

## 2023-02-19 RX ORDER — LOSARTAN POTASSIUM 50 MG/1
50 TABLET ORAL
Status: CANCELLED | OUTPATIENT
Start: 2023-02-19

## 2023-02-19 RX ORDER — SODIUM CHLORIDE 9 MG/ML
75 INJECTION, SOLUTION INTRAVENOUS CONTINUOUS
Status: DISCONTINUED | OUTPATIENT
Start: 2023-02-19 | End: 2023-02-21 | Stop reason: HOSPADM

## 2023-02-19 RX ADMIN — HYDROCHLOROTHIAZIDE 25 MG: 25 TABLET ORAL at 08:00

## 2023-02-19 RX ADMIN — METOPROLOL TARTRATE 25 MG: 25 TABLET, FILM COATED ORAL at 08:00

## 2023-02-19 RX ADMIN — SODIUM CHLORIDE 75 ML/HR: 9 INJECTION, SOLUTION INTRAVENOUS at 12:16

## 2023-02-19 RX ADMIN — TIZANIDINE 4 MG: 4 TABLET ORAL at 20:29

## 2023-02-19 RX ADMIN — DIPHENHYDRAMINE HYDROCHLORIDE 25 MG: 25 CAPSULE ORAL at 13:29

## 2023-02-19 RX ADMIN — ACETAMINOPHEN 1000 MG: 500 TABLET ORAL at 20:24

## 2023-02-19 RX ADMIN — POTASSIUM CHLORIDE 40 MEQ: 750 CAPSULE, EXTENDED RELEASE ORAL at 20:24

## 2023-02-19 RX ADMIN — POTASSIUM CHLORIDE 40 MEQ: 750 CAPSULE, EXTENDED RELEASE ORAL at 07:59

## 2023-02-19 RX ADMIN — POTASSIUM CHLORIDE 40 MEQ: 750 CAPSULE, EXTENDED RELEASE ORAL at 12:13

## 2023-02-19 RX ADMIN — PSEUDOEPHEDRINE SULFATE, LORATADINE 1 TABLET: 10; 240 TABLET, FILM COATED ORAL at 09:00

## 2023-02-19 RX ADMIN — Medication 10 ML: at 20:29

## 2023-02-19 RX ADMIN — LOSARTAN POTASSIUM 100 MG: 50 TABLET, FILM COATED ORAL at 08:00

## 2023-02-19 RX ADMIN — ONDANSETRON 4 MG: 2 INJECTION INTRAMUSCULAR; INTRAVENOUS at 13:29

## 2023-02-19 RX ADMIN — POTASSIUM CHLORIDE 40 MEQ: 750 CAPSULE, EXTENDED RELEASE ORAL at 23:30

## 2023-02-19 RX ADMIN — LEVOTHYROXINE SODIUM 150 MCG: 150 TABLET ORAL at 07:59

## 2023-02-19 RX ADMIN — TAZOBACTAM SODIUM AND PIPERACILLIN SODIUM 3.38 G: 375; 3 INJECTION, SOLUTION INTRAVENOUS at 03:42

## 2023-02-19 RX ADMIN — TRAMADOL HYDROCHLORIDE 50 MG: 50 TABLET, COATED ORAL at 17:52

## 2023-02-19 RX ADMIN — DIATRIZOATE MEGLUMINE AND DIATRIZOATE SODIUM 15 ML: 660; 100 LIQUID ORAL; RECTAL at 12:13

## 2023-02-19 RX ADMIN — Medication 10 ML: at 08:00

## 2023-02-19 RX ADMIN — ERTAPENEM 1 G: 1 INJECTION INTRAMUSCULAR; INTRAVENOUS at 12:13

## 2023-02-19 RX ADMIN — ACETAMINOPHEN 1000 MG: 500 TABLET ORAL at 08:08

## 2023-02-19 RX ADMIN — TRAMADOL HYDROCHLORIDE 50 MG: 50 TABLET, COATED ORAL at 10:11

## 2023-02-19 RX ADMIN — IOPAMIDOL 85 ML: 612 INJECTION, SOLUTION INTRAVENOUS at 14:29

## 2023-02-19 RX ADMIN — AMITRIPTYLINE HYDROCHLORIDE 10 MG: 10 TABLET, FILM COATED ORAL at 20:24

## 2023-02-19 RX ADMIN — FLUOXETINE 40 MG: 20 CAPSULE ORAL at 08:00

## 2023-02-19 RX ADMIN — CYANOCOBALAMIN TAB 1000 MCG 1000 MCG: 1000 TAB at 08:00

## 2023-02-19 RX ADMIN — TRAMADOL HYDROCHLORIDE 50 MG: 50 TABLET, COATED ORAL at 03:45

## 2023-02-19 NOTE — PROGRESS NOTES
[facility]  IP Progress Note      Chief Complaint/Reason for service: Chest pain     Subjective   Subjective: The patient complains that she had explosive diarrhea yesterday.  She also had an episode of chest discomfort again which lasted about an hour.  She denies any history of exertional chest pain.    Past medical, surgical, social and family history reviewed in the patient's electronic medical record.    Objective     Vital Sign Min/Max for last 24 hours  Temp  Min: 97.8 °F (36.6 °C)  Max: 98.3 °F (36.8 °C)   BP  Min: 115/71  Max: 119/67   Pulse  Min: 62  Max: 73   Resp  Min: 16  Max: 18   SpO2  Min: 99 %  Max: 100 %   Flow (L/min)  Min: 2  Max: 2      Intake/Output Summary (Last 24 hours) at 2/19/2023 0645  Last data filed at 2/18/2023 1700  Gross per 24 hour   Intake 720 ml   Output --   Net 720 ml             Current Facility-Administered Medications:   •  acetaminophen (OFIRMEV) injection 1,000 mg, 1,000 mg, Intravenous, Once, Randal Carmen MD  •  acetaminophen (TYLENOL) tablet 1,000 mg, 1,000 mg, Oral, Q6H PRN, Annalisa Wood, , 1,000 mg at 02/18/23 1828  •  amitriptyline (ELAVIL) tablet 10 mg, 10 mg, Oral, Nightly, Paolo Lolis, APRN, 10 mg at 02/18/23 2013  •  Calcium Replacement - Initiate Nurse / BPA Driven Protocol, , Does not apply, PRN, Annalisa Wood DO  •  diphenhydrAMINE (BENADRYL) capsule 25 mg, 25 mg, Oral, Q6H PRN, Darell, Bethany, APRN, 25 mg at 02/18/23 2013  •  FLUoxetine (PROzac) capsule 40 mg, 40 mg, Oral, Daily, Krjanetr Lolis, APRN, 40 mg at 02/18/23 0752  •  losartan (COZAAR) tablet 100 mg, 100 mg, Oral, Q24H, 100 mg at 02/18/23 0752 **AND** hydroCHLOROthiazide (HYDRODIURIL) tablet 25 mg, 25 mg, Oral, Q24H, [employer]ger, Lolis, APRN, 25 mg at 02/18/23 0752  •  hydrOXYzine (ATARAX) tablet 25 mg, 25 mg, Oral, TID PRN, Annalisa Wood DO, 25 mg at 02/18/23 1828  •  levothyroxine (SYNTHROID, LEVOTHROID) tablet 150 mcg, 150 mcg, Oral, Q  AM, Annalisa Wood DO, 150 mcg at 02/18/23 0546  •  Magnesium Standard Dose Replacement - Initiate Nurse / BPA Driven Protocol, , Does not apply, PRN, Annalisa Wood DO  •  metoprolol tartrate (LOPRESSOR) tablet 25 mg, 25 mg, Oral, Daily, [employer]ger, Lolis, APRN, 25 mg at 02/18/23 0751  •  ondansetron (ZOFRAN) tablet 4 mg, 4 mg, Oral, Q6H PRN **OR** ondansetron (ZOFRAN) injection 4 mg, 4 mg, Intravenous, Q6H PRN, [employer]ger, Lolis, APRN  •  Phosphorus Replacement - Initiate Nurse / BPA Driven Protocol, , Does not apply, PRN, Annalisa Wood DO  •  piperacillin-tazobactam (ZOSYN) 3.375 g in iso-osmotic dextrose 50 ml (premix), 3.375 g, Intravenous, Q8H, Paolo, Lolis, APRN, 3.375 g at 02/19/23 0342  •  Potassium Replacement - Initiate Nurse / BPA Driven Protocol, , Does not apply, PRN, Annalisa Wood DO  •  simethicone (MYLICON) chewable tablet 80 mg, 80 mg, Oral, 4x Daily PRN, Annalisa Wood DO, 80 mg at 02/18/23 1554  •  sodium chloride 0.9 % flush 10 mL, 10 mL, Intravenous, PRN, Paulo Glover MD  •  sodium chloride 0.9 % flush 10 mL, 10 mL, Intravenous, Q12H, Paolo, Lolis, APRN, 10 mL at 02/18/23 0753  •  sodium chloride 0.9 % flush 10 mL, 10 mL, Intravenous, PRN, Satyaoger, Lolis, APRN  •  sodium chloride 0.9 % infusion 40 mL, 40 mL, Intravenous, PRN, Satyaoger, Lolis, APRN  •  tiZANidine (ZANAFLEX) tablet 4 mg, 4 mg, Oral, Nightly PRN, [employer]ger, Lolis, APRN, 4 mg at 02/18/23 2012  •  traMADol (ULTRAM) tablet 50 mg, 50 mg, Oral, Q6H PRN, Annalisa Wood DO, 50 mg at 02/19/23 0345  •  vitamin B-12 (CYANOCOBALAMIN) tablet 1,000 mcg, 1,000 mcg, Oral, Daily, Lolis Boone APRN, 1,000 mcg at 02/18/23 0752    Physical Exam: General pleasant overweight female nondyspneic tachypneic stable vitals        HEENT: No JVP       Respiratory: Clear anteriorly       Cardiovascular: Regular rate and rhythm without murmur gallop or click       GI: Positive bowel sound       Lower Extremities: No lesion       Neuro:  Facial expressions symmetric           Psych: Pleasant affect    Results Review: At bedtime troponin was elevated yesterday at 11.  Heart rates in the 60s.  Blood pressures 10 6-1 21    Radiology Results:  Imaging Results (Last 72 Hours)     Procedure Component Value Units Date/Time    XR Chest 1 View [[id number]] Collected: 02/16/23 2029     Updated: 02/16/23 2032    Narrative:      XR CHEST 1 VW    Date of Exam: 2/16/2023 8:00 PM EST    Indication: chest pain.    Comparison: Chest x-ray 8/16/2016    Findings:  Normal cardiomediastinal silhouette. The lungs are clear. No pleural effusion or pneumothorax. No acute osseous findings.      Impression:      Impression:  No acute cardiopulmonary findings.       Electronically Signed: Keon Bunch    2/16/2023 8:30 PM EST    Workstation ID: [id number]    CT Abdomen Pelvis With Contrast [[id number]] Collected: 02/16/23 1742     Updated: 02/16/23 1754    Narrative:      CT ABDOMEN PELVIS W CONTRAST    Date of Exam: 2/16/2023 5:33 PM EST    Indication: LLQ pain.    Comparison: CT abdomen and pelvis 10/16/2022    Technique: Axial CT images were obtained of the abdomen and pelvis following the uneventful intravenous administration of 100 mL Isovue-300. Reconstructed coronal and sagittal images were also obtained. Automated exposure control and iterative   construction methods were used.    Findings:  Unremarkable appearance of the lower thorax. There is diffuse hypoattenuation of the liver parenchyma compatible with hepatic steatosis. Unremarkable appearance of the gallbladder and bile ducts. Normal appearance of the spleen, pancreas, adrenal glands,   kidneys, ureters, and decompressed bladder. The uterus is surgically absent.    There is a short 4 cm segment of mid sigmoid colonic wall thickening with peridiverticular fat stranding, compatible with acute diverticulitis. No peridiverticular abscess/fluid or extraluminal air to suggest perforation. The location of diverticulitis    is similar to that of prior CT from October 2022. Otherwise grossly unremarkable appearance of the GI tract. No abdominopelvic free fluid. No pneumoperitoneum.    Normal appearance of the vasculature. No lymphadenopathy. Unremarkable appearance of the body wall soft tissues. No acute or suspicious bony findings.      Impression:      Impression:  Acute uncomplicated sigmoid diverticulitis. The location is similar to prior diverticulitis seen on October 2022 CT. Given recurrent diverticulitis in this area, colonoscopy is recommended to exclude possibility of colonic neoplasm in this area, and   correlate with colonoscopy history.    Electronically Signed: Keon Bunch    2/16/2023 5:52 PM EST    Workstation ID: [id number]          EKG: Sinus rhythm nonspecific ST abnormality    ECHO: EF 66 to 70%    Tele: Sinus rhythm    Assessment   Assessment/Plan: Chest pain-patient had another episode of pain at rest yesterday which lasted about an hour.  There were no associated EKG changes.  Prior to come to the hospital there is been no history of exertional chest pain.  We will plan to proceed with stress PET tomorrow  2 patient still continues to have significant abdominal pain and developed diarrhea.  She should get checked for C. Difficile  3 borderline blood pressure-we will reduce losartan to 50 mg a day    Randal Carmen MD  02/19/23  06:45 EST

## 2023-02-19 NOTE — PROGRESS NOTES
"[facility] ([facility]        Length of stay: 0 days    Subjective: Patient continues to have pain despite treatment with IV antibiotics.  She has not had fever over the last 24 hours.  She has had a couple bouts of diarrhea and some exquisite abdominal pain immediately prior and after to this.  C. difficile was tested this morning and is negative.    Physical Exam:  Blood pressure 125/64, pulse 68, temperature 98.6 °F (37 °C), temperature source Oral, resp. rate 18, height 167.6 cm (66\"), weight 94.9 kg (209 lb 3.2 oz), SpO2 96 %.    Abd: Soft, tender to palpation suprapubically in the left lower quadrant, really no significant improvement over the last several days.  Nondistended.    Labs past 24 hours:  Lab Results (last 24 hours)     Procedure Component Value Units Date/Time    Clostridioides difficile Toxin - Stool, Per Rectum [[id number]]  (Normal) Collected: 02/19/23 0921    Specimen: Stool from Per Rectum Updated: 02/19/23 1031    Narrative:      The following orders were created for panel order Clostridioides difficile Toxin - Stool, Per Rectum.  Procedure                               Abnormality         Status                     ---------                               -----------         ------                     Clostridioides difficile...[[id number]]  Normal              Final result                 Please view results for these tests on the individual orders.    Clostridioides difficile Toxin, PCR - Stool, Per Rectum [[id number]]  (Normal) Collected: 02/19/23 0921    Specimen: Stool from Per Rectum Updated: 02/19/23 1031     C. Difficile Toxins by PCR Not Detected    Narrative:      The result indicates the absence of toxigenic C. difficile from stool specimen.     Basic Metabolic Panel [[id number]]  (Abnormal) Collected: 02/19/23 0312    Specimen: Blood Updated: 02/19/23 0620     Glucose 89 mg/dL      BUN 10 mg/dL      Creatinine 0.87 mg/dL      Sodium 142 mmol/L      " Potassium 3.2 mmol/L      Chloride 102 mmol/L      CO2 31.0 mmol/L      Calcium 8.6 mg/dL      BUN/Creatinine Ratio 11.5     Anion Gap 9.0 mmol/L      eGFR 81.3 mL/min/1.73     Narrative:      GFR Normal >60  Chronic Kidney Disease <60  Kidney Failure <15      Magnesium [[id number]]  (Normal) Collected: 02/19/23 0312    Specimen: Blood Updated: 02/19/23 0620     Magnesium 2.0 mg/dL     CBC (No Diff) [[id number]]  (Normal) Collected: 02/19/23 0312    Specimen: Blood Updated: 02/19/23 0517     WBC 4.47 10*3/mm3      RBC 4.40 10*6/mm3      Hemoglobin 13.5 g/dL      Hematocrit 39.8 %      MCV 90.5 fL      MCH 30.7 pg      MCHC 33.9 g/dL      RDW 12.8 %      RDW-SD 42.3 fl      MPV 10.4 fL      Platelets 246 10*3/mm3     Blood Culture - Blood, Hand, Left [[id number]]  (Normal) Collected: 02/16/23 1940    Specimen: Blood from Hand, Left Updated: 02/18/23 2016     Blood Culture No growth at 2 days    Blood Culture - Blood, Arm, Right [[id number]]  (Normal) Collected: 02/16/23 1625    Specimen: Blood from Arm, Right Updated: 02/18/23 1645     Blood Culture No growth at 2 days          I/O last shift:  No intake/output data recorded.       Pathology:  * Cannot find OR log *.    Assessment and Plan:  52-year-old female who presented with uncomplicated diverticulitis with failed outpatient management on Augmentin.  She has been on IV antibiotics for nearly 72 hours now with no significant improvement.  Her diarrhea and pain is likely secondary to passage of stool through a narrowed sigmoid caliber.  C. difficile was negative.    Plan  -I think is reasonable to repeat her CT scan today as she has had no significant improvement despite being on IV antibiotics for almost 72 hours.  -I instructed her to take very minimal input by mouth  -I will place her on some fluids today as to prevent dehydration  -With the fact that she is taken so long to improve on IV antibiotics, she will likely require some in the outpatient setting, will  consult infectious disease for further evaluation and management the outpatient setting  -Certainly will require a colectomy, hopefully this can be afforded in the outpatient setting  -Going for stress test tomorrow, hopefully will have no significant atherosclerotic disease as she will likely need surgery in the next 2 months.    Isidro Stanley MD  [facility]  02/19/23  11:16 EST

## 2023-02-19 NOTE — PROGRESS NOTES
"                    Clinical Nutrition       Patient Name: Heidy Whitman  YOB: 1970  MRN: [id number]  Date of Encounter: 23 12:30 EST  Admission date: 2023      Reason for Visit   NPO/Clear [id number]    EMR Reviewed     EMR Reviewed: yes     Admission Diagnosis:  Diverticulitis [K57.92]    Problem List:    Diverticulitis    HTN (hypertension)    HLD (hyperlipidemia)    Hypothyroidism (acquired)    Rheumatoid arthritis involving multiple sites (HCC)    Other chest pain    BLAKE (dyspnea on exertion)    Current use of steroid medication    Anthropometric      Flowsheet Rows    Flowsheet Row First Filed Value   Admission Height 167.6 cm (66\") Documented at 2023 1553   Admission Weight 92.1 kg (203 lb) Documented at 2023 1553            Height: 167.6 cm (66\")  Last Filed Weight: Weight: 94.9 kg (209 lb 3.2 oz) (23)  Weight Method: Standing scale  BMI: BMI (Calculated): 33.8  BMI classification: Obese Class I: 30-34.9kg/m2   IBW:  130 lb     Reported/Observed/Food/Nutrition Related - Comments     Pt receiving medical treatment of diverticulitis. Per Dr. Stanley's note today- \"I instructed her to take very minimal input by mouth. I will place her on some fluids today as to prevent dehydration\"     Current Nutrition Prescription     Diet: Liquid Diets; Clear Liquid; Texture: Regular Texture (IDDSI 7); Fluid Consistency: Thin (IDDSI 0)  Orders Placed This Encounter      DIET MESSAGE NO: red dye 40, eggs, caffeine, wheat or barley. Patient is lactose intolerant.      Average Intake from Chart[facility] Day: 58% X 3 meals documented on CLD     Nutrition Diagnosis     Problem Inadequate oral intake   Etiology Per clinical status   Signs/Symptoms NPO/CLDX3d   Status:    Actions     Follow treatment progress, Care plan reviewed   Diet advancement/management per MD. Will not send ONS at this time per their recommendation to keep oral intake to minimum at this time.    Monitor Per " Protocol      Deloris Humphries RD  Time Spent: 15m

## 2023-02-19 NOTE — PROGRESS NOTES
"    [facility]  PROGRESS NOTE    Patient Name: Heidy Whitman  : 1970  MRN: [id number]    Date of Admission: 2023  Primary Care Physician: Itzel Long MD    Subjective   Subjective     CC:  F/U recurrent diverticulitis    HPI:  Patient seen and examined. Still having LLQ pain, diarrhea started yesterday, \"pure water\" per patient. Afebrile.     ROS:  Gen- No fevers, chills  CV- no chest pain, no palpitations  Resp- No cough, dyspnea  GI- No N/V, +LLQ abd pain, +diarrhea     Objective   Objective     Vital Signs:   Temp:  [97.9 °F (36.6 °C)-98.6 °F (37 °C)] 98.6 °F (37 °C)  Heart Rate:  [63-73] 68  Resp:  [16-18] 18  BP: (116-125)/(64-78) 125/64     Physical Exam:  Constitutional: No acute distress, awake, alert  HENT: NCAT, mucous membranes moist  Respiratory: Clear to auscultation bilaterally, respiratory effort normal   Cardiovascular: RRR, no murmurs, rubs, or gallops  Gastrointestinal: Positive bowel sounds, soft, non-distended, +TTP LLQ, no rigidity, rebound or guarding   Musculoskeletal: No bilateral ankle edema  Psychiatric: Appropriate affect, cooperative  Neurologic: Oriented x 3, no focal deficits, speech clear  Skin: No rashes    Results Reviewed:  LAB RESULTS:      Lab 23  0312 23  0256 23  0344 23  1858 23  1627   WBC 4.47 5.94 6.73  --  8.22   HEMOGLOBIN 13.5 14.9 13.6  --  15.4   HEMATOCRIT 39.8 45.6 41.0  --  46.4   PLATELETS 246 279 256  --  306   NEUTROS ABS  --   --  3.36  --  4.90   IMMATURE GRANS (ABS)  --   --  0.02  --  0.03   LYMPHS ABS  --   --  2.46  --  2.28   MONOS ABS  --   --  0.76  --  0.92*   EOS ABS  --   --  0.10  --  0.06   MCV 90.5 91.4 91.5  --  90.1   SED RATE  --   --   --   --  36*   CRP  --   --   --  1.40*  --    PROCALCITONIN  --   --   --   --  0.06   LACTATE  --   --   --   --  1.2         Lab 23  0312 23  0256 23  0344 23  1630 23  1627   SODIUM 142 " 138 138  --  141   POTASSIUM 3.2* 3.3* 4.2  --  4.1   CHLORIDE 102 98 101  --  102   CO2 31.0* 28.0 27.0  --  27.0   ANION GAP 9.0 12.0 10.0  --  12.0   BUN 10 14 14  --  13   CREATININE 0.87 0.73 0.77 0.90  0.90 0.91   EGFR 81.3 100.3 94.1  --  77.0   GLUCOSE 89 83 93  --  106*   CALCIUM 8.6 9.0 9.0  --  9.2   MAGNESIUM 2.0  --  2.0  --   --    HEMOGLOBIN A1C  --   --  5.90*  --   --          Lab 02/16/23  1627   TOTAL PROTEIN 7.3   ALBUMIN 4.5   GLOBULIN 2.8   ALT (SGPT) 68*   AST (SGOT) 52*   BILIRUBIN 0.5   ALK PHOS 101   LIPASE 24         Lab 02/18/23  0937 02/18/23  0730 02/17/23  0344 02/16/23  1858 02/16/23  1627   PROBNP  --   --   --  34.6  --    HSTROP T 11* 7 <6 7 11*         Lab 02/17/23  0344   CHOLESTEROL 144   LDL CHOL 78   HDL CHOL 42   TRIGLYCERIDES 137             Brief Urine Lab Results  (Last result in the past 365 days)      Color   Clarity   Blood   Leuk Est   Nitrite   Protein   CREAT   Urine HCG        02/16/23 1732 Yellow   Clear   Negative   Trace   Negative   Trace                 Microbiology Results Abnormal     Procedure Component Value - Date/Time    Clostridioides difficile Toxin - Stool, Per Rectum [[id number]]  (Normal) Collected: 02/19/23 0921    Lab Status: Final result Specimen: Stool from Per Rectum Updated: 02/19/23 1031    Narrative:      The following orders were created for panel order Clostridioides difficile Toxin - Stool, Per Rectum.  Procedure                               Abnormality         Status                     ---------                               -----------         ------                     Clostridioides difficile...[[id number]]  Normal              Final result                 Please view results for these tests on the individual orders.    Clostridioides difficile Toxin, PCR - Stool, Per Rectum [[id number]]  (Normal) Collected: 02/19/23 0921    Lab Status: Final result Specimen: Stool from Per Rectum Updated: 02/19/23 1031     C. Difficile Toxins by PCR  Not Detected    Narrative:      The result indicates the absence of toxigenic C. difficile from stool specimen.     Blood Culture - Blood, Hand, Left [[id number]]  (Normal) Collected: 02/16/23 1940    Lab Status: Preliminary result Specimen: Blood from Hand, Left Updated: 02/18/23 2016     Blood Culture No growth at 2 days    Blood Culture - Blood, Arm, Right [[id number]]  (Normal) Collected: 02/16/23 1625    Lab Status: Preliminary result Specimen: Blood from Arm, Right Updated: 02/18/23 1645     Blood Culture No growth at 2 days          Adult Transthoracic Echo Complete w/ Color, Spectral and Contrast if necessary per protocol    Result Date: 2/17/2023  •  Left ventricular ejection fraction appears to be 66 - 70%. •  Estimated right ventricular systolic pressure from tricuspid regurgitation is normal (<35 mmHg).       Results for orders placed during the hospital encounter of 02/16/23    Adult Transthoracic Echo Complete w/ Color, Spectral and Contrast if necessary per protocol    Interpretation Summary  •  Left ventricular ejection fraction appears to be 66 - 70%.  •  Estimated right ventricular systolic pressure from tricuspid regurgitation is normal (<35 mmHg).      I have reviewed the medications:  Scheduled Meds:acetaminophen, 1,000 mg, Intravenous, Once  amitriptyline, 10 mg, Oral, Nightly  FLUoxetine, 40 mg, Oral, Daily  losartan, 100 mg, Oral, Q24H   And  hydroCHLOROthiazide, 25 mg, Oral, Q24H  levothyroxine, 150 mcg, Oral, Q AM  loratadine-pseudoephedrine, 1 tablet, Oral, QAM  metoprolol tartrate, 25 mg, Oral, Daily  piperacillin-tazobactam, 3.375 g, Intravenous, Q8H  potassium chloride, 40 mEq, Oral, Q4H  sodium chloride, 10 mL, Intravenous, Q12H  vitamin B-12, 1,000 mcg, Oral, Daily      Continuous Infusions:   PRN Meds:.•  acetaminophen  •  Calcium Replacement - Initiate Nurse / BPA Driven Protocol  •  diphenhydrAMINE  •  hydrOXYzine  •  Magnesium Standard Dose Replacement - Initiate Nurse / BPA Driven  Protocol  •  ondansetron **OR** ondansetron  •  Phosphorus Replacement - Initiate Nurse / BPA Driven Protocol  •  Potassium Replacement - Initiate Nurse / BPA Driven Protocol  •  simethicone  •  sodium chloride  •  sodium chloride  •  sodium chloride  •  tiZANidine  •  traMADol    Assessment & Plan   Assessment & Plan     Active Hospital Problems    Diagnosis  POA   • **Diverticulitis [K57.92]  Yes   • HTN (hypertension) [I10]  Unknown   • HLD (hyperlipidemia) [E78.5]  Unknown   • Hypothyroidism (acquired) [E03.9]  Unknown   • Rheumatoid arthritis involving multiple sites (HCC) [M06.9]  Unknown   • Other chest pain [R07.89]  Unknown   • BLAKE (dyspnea on exertion) [R06.09]  Unknown   • Current use of steroid medication [Z79.52]  Not Applicable      Resolved Hospital Problems   No resolved problems to display.        Brief Hospital Course to date:  Heidy Whitman is a 52 y.o. female with PMH of HTN, hypothyroidism, HLD, RA and diverticulitis (x5) presents to the ED for abdominal pain.    This patient's problems and plans were partially entered by my partner and updated as appropriate by me 02/19/23.     Diverticulitis  -CT abd shows acute uncomplicated sigmoid diverticulitis, location is similar to prior diverticulitis  -Last colonoscopy in 2019 with Dr. Little  -Pain control has been an issue due to itching with narcotics. Continue Ultram and premedicating with Atarax   -Antiemetics  -Failed outpatient therapy of Augmentin  -Due to continued pain, change Zosyn to Invanz   -S/P Maintenance fluids  -Colorectal Surgery following, plan for outpatient elective colectomy  -Continue Clear liquids   -Obtain stool for C diff due to new diarrhea   -Suspect patient will need repeat CT abd pelvis with contrast today due to continued pain but will defer to CRS   -Will ask ID to evaluate in the AM for IV ABX outpatient due to recurrent diverticulitis      Chest Pain  -Echo reviewed, EF WNL  -Cardiology following, plan for Stress  Test Monday     Uncontrolled HTN, HLD  -Continue home statin, losartan/HCTZ, metoprolol  -Holding home lasix      RA  -On amitryptiline   -Gets Enbrel and Praluent injects, last was 2/14  -Not taking steroids currently   -Follows with Dr Adarsh Patton      Hypothyroidism  -Continue home synthroid    Hypokalemia  -Replace per protocol     Seasonal allergies  -Requests her home Claritin D, ordered     Expected Discharge Location and Transportation: Home   Expected Discharge   Expected Discharge Date and Time     Expected Discharge Date Expected Discharge Time    Feb 21, 2023            DVT prophylaxis:  Mechanical DVT prophylaxis orders are present.          CODE STATUS:   Code Status and Medical Interventions:   Ordered at: 02/16/23 1954     Level Of Support Discussed With:    Patient     Code Status (Patient has no pulse and is not breathing):    CPR (Attempt to Resuscitate)     Medical Interventions (Patient has pulse or is breathing):    Full Support       Annalisa Wood,   02/19/23

## 2023-02-20 ENCOUNTER — APPOINTMENT (OUTPATIENT)
Dept: CARDIOLOGY | Facility: HOSPITAL | Age: 53
End: 2023-02-20
Payer: COMMERCIAL

## 2023-02-20 LAB
ANION GAP SERPL CALCULATED.3IONS-SCNC: 7 MMOL/L (ref 5–15)
BASOPHILS # BLD AUTO: 0.02 10*3/MM3 (ref 0–0.2)
BASOPHILS NFR BLD AUTO: 0.5 % (ref 0–1.5)
BH CV REST NUCLEAR ISOTOPE DOSE: 29.9 MCI
BH CV STRESS BP STAGE 1: NORMAL
BH CV STRESS BP STAGE 3: NORMAL
BH CV STRESS COMMENTS STAGE 1: NORMAL
BH CV STRESS DOSE REGADENOSON STAGE 1: 0.4
BH CV STRESS DURATION MIN STAGE 1: 1
BH CV STRESS DURATION MIN STAGE 2: 1
BH CV STRESS DURATION MIN STAGE 3: 1
BH CV STRESS DURATION MIN STAGE 4: 1
BH CV STRESS DURATION SEC STAGE 1: 0
BH CV STRESS DURATION SEC STAGE 2: 0
BH CV STRESS DURATION SEC STAGE 3: 0
BH CV STRESS DURATION SEC STAGE 4: 0
BH CV STRESS HR STAGE 1: 83
BH CV STRESS HR STAGE 2: 93
BH CV STRESS HR STAGE 3: 93
BH CV STRESS HR STAGE 4: 93
BH CV STRESS NUCLEAR ISOTOPE DOSE: 29.9 MCI
BH CV STRESS O2 STAGE 1: 97
BH CV STRESS O2 STAGE 2: 98
BH CV STRESS O2 STAGE 3: 98
BH CV STRESS O2 STAGE 4: 97
BH CV STRESS PROTOCOL 1: NORMAL
BH CV STRESS RECOVERY BP: NORMAL MMHG
BH CV STRESS RECOVERY HR: 86 BPM
BH CV STRESS RECOVERY O2: 96 %
BH CV STRESS STAGE 1: 1
BH CV STRESS STAGE 2: 2
BH CV STRESS STAGE 3: 3
BH CV STRESS STAGE 4: 4
BUN SERPL-MCNC: 8 MG/DL (ref 6–20)
BUN/CREAT SERPL: 12.5 (ref 7–25)
CALCIUM SPEC-SCNC: 8.5 MG/DL (ref 8.6–10.5)
CHLORIDE SERPL-SCNC: 107 MMOL/L (ref 98–107)
CO2 SERPL-SCNC: 26 MMOL/L (ref 22–29)
CREAT SERPL-MCNC: 0.64 MG/DL (ref 0.57–1)
DEPRECATED RDW RBC AUTO: 42.8 FL (ref 37–54)
EGFRCR SERPLBLD CKD-EPI 2021: 107.8 ML/MIN/1.73
EOSINOPHIL # BLD AUTO: 0.1 10*3/MM3 (ref 0–0.4)
EOSINOPHIL NFR BLD AUTO: 2.5 % (ref 0.3–6.2)
ERYTHROCYTE [DISTWIDTH] IN BLOOD BY AUTOMATED COUNT: 12.6 % (ref 12.3–15.4)
GLUCOSE SERPL-MCNC: 82 MG/DL (ref 65–99)
HCT VFR BLD AUTO: 39 % (ref 34–46.6)
HGB BLD-MCNC: 12.9 G/DL (ref 12–15.9)
IMM GRANULOCYTES # BLD AUTO: 0.01 10*3/MM3 (ref 0–0.05)
IMM GRANULOCYTES NFR BLD AUTO: 0.2 % (ref 0–0.5)
LV EF NUC BP: 76 %
LYMPHOCYTES # BLD AUTO: 1.58 10*3/MM3 (ref 0.7–3.1)
LYMPHOCYTES NFR BLD AUTO: 39.4 % (ref 19.6–45.3)
MAXIMAL PREDICTED HEART RATE: 170 BPM
MCH RBC QN AUTO: 30.5 PG (ref 26.6–33)
MCHC RBC AUTO-ENTMCNC: 33.1 G/DL (ref 31.5–35.7)
MCV RBC AUTO: 92.2 FL (ref 79–97)
MONOCYTES # BLD AUTO: 0.64 10*3/MM3 (ref 0.1–0.9)
MONOCYTES NFR BLD AUTO: 16 % (ref 5–12)
NEUTROPHILS NFR BLD AUTO: 1.66 10*3/MM3 (ref 1.7–7)
NEUTROPHILS NFR BLD AUTO: 41.4 % (ref 42.7–76)
NRBC BLD AUTO-RTO: 0 /100 WBC (ref 0–0.2)
PERCENT MAX PREDICTED HR: 54.71 %
PLATELET # BLD AUTO: 229 10*3/MM3 (ref 140–450)
PMV BLD AUTO: 10.2 FL (ref 6–12)
POTASSIUM SERPL-SCNC: 4.2 MMOL/L (ref 3.5–5.2)
PROCALCITONIN SERPL-MCNC: 0.03 NG/ML (ref 0–0.25)
RBC # BLD AUTO: 4.23 10*6/MM3 (ref 3.77–5.28)
SODIUM SERPL-SCNC: 140 MMOL/L (ref 136–145)
STRESS BASELINE BP: NORMAL MMHG
STRESS BASELINE HR: 67 BPM
STRESS O2 SAT REST: 95 %
STRESS PERCENT HR: 64 %
STRESS POST ESTIMATED WORKLOAD: 1 METS
STRESS POST EXERCISE DUR MIN: 4 MIN
STRESS POST EXERCISE DUR SEC: 0 SEC
STRESS POST O2 SAT PEAK: 97 %
STRESS POST PEAK BP: NORMAL MMHG
STRESS POST PEAK HR: 93 BPM
STRESS TARGET HR: 145 BPM
WBC NRBC COR # BLD: 4.01 10*3/MM3 (ref 3.4–10.8)

## 2023-02-20 PROCEDURE — 84145 PROCALCITONIN (PCT): CPT | Performed by: FAMILY MEDICINE

## 2023-02-20 PROCEDURE — G0378 HOSPITAL OBSERVATION PER HR: HCPCS

## 2023-02-20 PROCEDURE — 63710000001 DIPHENHYDRAMINE PER 50 MG: Performed by: NURSE PRACTITIONER

## 2023-02-20 PROCEDURE — 93017 CV STRESS TEST TRACING ONLY: CPT

## 2023-02-20 PROCEDURE — 78431 MYOCRD IMG PET RST&STRS CT: CPT

## 2023-02-20 PROCEDURE — C1894 INTRO/SHEATH, NON-LASER: HCPCS

## 2023-02-20 PROCEDURE — 99231 SBSQ HOSP IP/OBS SF/LOW 25: CPT | Performed by: FAMILY MEDICINE

## 2023-02-20 PROCEDURE — 96367 TX/PROPH/DG ADDL SEQ IV INF: CPT

## 2023-02-20 PROCEDURE — C1751 CATH, INF, PER/CENT/MIDLINE: HCPCS

## 2023-02-20 PROCEDURE — 93018 CV STRESS TEST I&R ONLY: CPT | Performed by: INTERNAL MEDICINE

## 2023-02-20 PROCEDURE — 63710000001 DIPHENHYDRAMINE PER 50 MG: Performed by: INTERNAL MEDICINE

## 2023-02-20 PROCEDURE — 85025 COMPLETE CBC W/AUTO DIFF WBC: CPT | Performed by: FAMILY MEDICINE

## 2023-02-20 PROCEDURE — 99214 OFFICE O/P EST MOD 30 MIN: CPT | Performed by: INTERNAL MEDICINE

## 2023-02-20 PROCEDURE — 25010000002 ERTAPENEM PER 500 MG: Performed by: INTERNAL MEDICINE

## 2023-02-20 PROCEDURE — A9555 RB82 RUBIDIUM: HCPCS | Performed by: INTERNAL MEDICINE

## 2023-02-20 PROCEDURE — 78431 MYOCRD IMG PET RST&STRS CT: CPT | Performed by: INTERNAL MEDICINE

## 2023-02-20 PROCEDURE — 25010000002 REGADENOSON 0.4 MG/5ML SOLUTION: Performed by: INTERNAL MEDICINE

## 2023-02-20 PROCEDURE — 0 RUBIDIUM CHLORIDE: Performed by: INTERNAL MEDICINE

## 2023-02-20 PROCEDURE — 80048 BASIC METABOLIC PNL TOTAL CA: CPT | Performed by: FAMILY MEDICINE

## 2023-02-20 RX ORDER — SODIUM CHLORIDE 0.9 % (FLUSH) 0.9 %
10 SYRINGE (ML) INJECTION EVERY 12 HOURS SCHEDULED
Status: DISCONTINUED | OUTPATIENT
Start: 2023-02-20 | End: 2023-02-20

## 2023-02-20 RX ORDER — DIPHENHYDRAMINE HCL 25 MG
25 CAPSULE ORAL
Status: DISCONTINUED | OUTPATIENT
Start: 2023-02-20 | End: 2023-02-21 | Stop reason: HOSPADM

## 2023-02-20 RX ORDER — SODIUM CHLORIDE 0.9 % (FLUSH) 0.9 %
10 SYRINGE (ML) INJECTION AS NEEDED
Status: DISCONTINUED | OUTPATIENT
Start: 2023-02-20 | End: 2023-02-20

## 2023-02-20 RX ORDER — SODIUM CHLORIDE 0.9 % (FLUSH) 0.9 %
20 SYRINGE (ML) INJECTION AS NEEDED
Status: DISCONTINUED | OUTPATIENT
Start: 2023-02-20 | End: 2023-02-20

## 2023-02-20 RX ORDER — CAFFEINE CITRATE 20 MG/ML
60 SOLUTION INTRAVENOUS ONCE
Status: COMPLETED | OUTPATIENT
Start: 2023-02-20 | End: 2023-02-20

## 2023-02-20 RX ADMIN — Medication 10 ML: at 12:09

## 2023-02-20 RX ADMIN — RUBIDIUM CHLORIDE RB-82 1 DOSE: 150 INJECTION, SOLUTION INTRAVENOUS at 08:26

## 2023-02-20 RX ADMIN — CYANOCOBALAMIN TAB 1000 MCG 1000 MCG: 1000 TAB at 09:12

## 2023-02-20 RX ADMIN — METOPROLOL TARTRATE 25 MG: 25 TABLET, FILM COATED ORAL at 09:12

## 2023-02-20 RX ADMIN — RUBIDIUM CHLORIDE RB-82 1 DOSE: 150 INJECTION, SOLUTION INTRAVENOUS at 08:37

## 2023-02-20 RX ADMIN — PSEUDOEPHEDRINE SULFATE, LORATADINE 1 TABLET: 10; 240 TABLET, FILM COATED ORAL at 09:15

## 2023-02-20 RX ADMIN — TRAMADOL HYDROCHLORIDE 50 MG: 50 TABLET, COATED ORAL at 12:07

## 2023-02-20 RX ADMIN — TRAMADOL HYDROCHLORIDE 50 MG: 50 TABLET, COATED ORAL at 18:42

## 2023-02-20 RX ADMIN — ACETAMINOPHEN 1000 MG: 500 TABLET ORAL at 23:44

## 2023-02-20 RX ADMIN — TIZANIDINE 4 MG: 4 TABLET ORAL at 20:14

## 2023-02-20 RX ADMIN — SODIUM CHLORIDE 75 ML/HR: 9 INJECTION, SOLUTION INTRAVENOUS at 02:51

## 2023-02-20 RX ADMIN — AMITRIPTYLINE HYDROCHLORIDE 10 MG: 10 TABLET, FILM COATED ORAL at 20:15

## 2023-02-20 RX ADMIN — FLUOXETINE 40 MG: 20 CAPSULE ORAL at 09:12

## 2023-02-20 RX ADMIN — CAFFEINE CITRATE 60 MG: 20 INJECTION, SOLUTION INTRAVENOUS at 08:52

## 2023-02-20 RX ADMIN — TRAMADOL HYDROCHLORIDE 50 MG: 50 TABLET, COATED ORAL at 05:02

## 2023-02-20 RX ADMIN — ERTAPENEM 1 G: 1 INJECTION INTRAMUSCULAR; INTRAVENOUS at 13:09

## 2023-02-20 RX ADMIN — DIPHENHYDRAMINE HYDROCHLORIDE 25 MG: 25 CAPSULE ORAL at 12:09

## 2023-02-20 RX ADMIN — Medication 10 ML: at 09:14

## 2023-02-20 RX ADMIN — HYDROCHLOROTHIAZIDE 25 MG: 25 TABLET ORAL at 09:13

## 2023-02-20 RX ADMIN — Medication 10 ML: at 20:16

## 2023-02-20 RX ADMIN — TRAMADOL HYDROCHLORIDE 50 MG: 50 TABLET, COATED ORAL at 23:47

## 2023-02-20 RX ADMIN — LEVOTHYROXINE SODIUM 150 MCG: 150 TABLET ORAL at 09:12

## 2023-02-20 RX ADMIN — REGADENOSON 0.4 MG: 0.08 INJECTION, SOLUTION INTRAVENOUS at 08:36

## 2023-02-20 RX ADMIN — LOSARTAN POTASSIUM 100 MG: 50 TABLET, FILM COATED ORAL at 09:12

## 2023-02-20 RX ADMIN — ACETAMINOPHEN 1000 MG: 500 TABLET ORAL at 07:50

## 2023-02-20 NOTE — PROGRESS NOTES
"[facility] ([facility]        Length of stay: 0 days    Subjective: Patient is doing well today.  She went for stress test earlier today.  States that her abdominal pain is improving.  Tolerating clear liquid diet without any issues    Underwent repeat CT scan yesterday afternoon, no signs of any microscopic perforation, abscess or worsening colitis    Physical Exam:  Blood pressure 117/70, pulse 79, temperature 98.1 °F (36.7 °C), temperature source Oral, resp. rate 16, height 167.6 cm (66\"), weight 94.9 kg (209 lb 3.2 oz), SpO2 96 %.    Abd: Soft, less tender to palpation.  Nondistended.    Labs past 24 hours:  Lab Results (last 24 hours)     Procedure Component Value Units Date/Time    Procalcitonin [[id number]]  (Normal) Collected: 02/20/23 0722    Specimen: Blood Updated: 02/20/23 0850     Procalcitonin 0.03 ng/mL     Narrative:      As a Marker for Sepsis (Non-Neonates):    1. <0.5 ng/mL represents a low risk of severe sepsis and/or septic shock.  2. >2 ng/mL represents a high risk of severe sepsis and/or septic shock.    As a Marker for Lower Respiratory Tract Infections that require antibiotic therapy:    PCT on Admission    Antibiotic Therapy       6-12 Hrs later    >0.5                Strongly Recommended  >0.25 - <0.5        Recommended   0.1 - 0.25          Discouraged              Remeasure/reassess PCT  <0.1                Strongly Discouraged     Remeasure/reassess PCT    As 28 day mortality risk marker: \"Change in Procalcitonin Result\" (>80% or <=80%) if Day 0 (or Day 1) and Day 4 values are available. Refer to http://www.[employer]-pct-calculator.com    Change in PCT <=80%  A decrease of PCT levels below or equal to 80% defines a positive change in PCT test result representing a higher risk for 28-day all-cause mortality of patients diagnosed with severe sepsis for septic shock.    Change in PCT >80%  A decrease of PCT levels of more than 80% defines a negative change in " PCT result representing a lower risk for 28-day all-cause mortality of patients diagnosed with severe sepsis or septic shock.       Basic Metabolic Panel [[id number]]  (Abnormal) Collected: 02/20/23 0722    Specimen: Blood Updated: 02/20/23 0850     Glucose 82 mg/dL      BUN 8 mg/dL      Creatinine 0.64 mg/dL      Sodium 140 mmol/L      Potassium 4.2 mmol/L      Chloride 107 mmol/L      CO2 26.0 mmol/L      Calcium 8.5 mg/dL      BUN/Creatinine Ratio 12.5     Anion Gap 7.0 mmol/L      eGFR 107.8 mL/min/1.73     Narrative:      GFR Normal >60  Chronic Kidney Disease <60  Kidney Failure <15      CBC & Differential [[id number]]  (Abnormal) Collected: 02/20/23 0722    Specimen: Blood Updated: 02/20/23 0823    Narrative:      The following orders were created for panel order CBC & Differential.  Procedure                               Abnormality         Status                     ---------                               -----------         ------                     CBC Auto Differential[[id number]]        Abnormal            Final result                 Please view results for these tests on the individual orders.    CBC Auto Differential [[id number]]  (Abnormal) Collected: 02/20/23 0722    Specimen: Blood Updated: 02/20/23 0823     WBC 4.01 10*3/mm3      RBC 4.23 10*6/mm3      Hemoglobin 12.9 g/dL      Hematocrit 39.0 %      MCV 92.2 fL      MCH 30.5 pg      MCHC 33.1 g/dL      RDW 12.6 %      RDW-SD 42.8 fl      MPV 10.2 fL      Platelets 229 10*3/mm3      Neutrophil % 41.4 %      Lymphocyte % 39.4 %      Monocyte % 16.0 %      Eosinophil % 2.5 %      Basophil % 0.5 %      Immature Grans % 0.2 %      Neutrophils, Absolute 1.66 10*3/mm3      Lymphocytes, Absolute 1.58 10*3/mm3      Monocytes, Absolute 0.64 10*3/mm3      Eosinophils, Absolute 0.10 10*3/mm3      Basophils, Absolute 0.02 10*3/mm3      Immature Grans, Absolute 0.01 10*3/mm3      nRBC 0.0 /100 WBC     Blood Culture - Blood, Hand, Left [[id number]]  (Normal)  Collected: 02/16/23 1940    Specimen: Blood from Hand, Left Updated: 02/19/23 2016     Blood Culture No growth at 3 days    Potassium [[id number]]  (Normal) Collected: 02/19/23 1631    Specimen: Blood Updated: 02/19/23 1706     Potassium 3.6 mmol/L      Comment: Slight hemolysis detected by analyzer. Results may be affected.       Blood Culture - Blood, Arm, Right [[id number]]  (Normal) Collected: 02/16/23 1625    Specimen: Blood from Arm, Right Updated: 02/19/23 1645     Blood Culture No growth at 3 days          I/O last shift:  I/O this shift:  In: 240 [P.O.:240]  Out: -        Pathology:  * Cannot find OR log *.    Assessment and Plan:  52-year-old female with recurrent uncomplicated diverticulitis, she has been slow to improve on IV antibiotics.  Finally starting to turn the corner    Plan  I will advance her to full liquid diet.  She will be discharged on this for several days.  Appreciate infectious disease seeing her and getting IV antibiotics set up  Hopefully home tomorrow if she tolerates a full liquid diet and if her stress test is normal.    She will follow-up with me in roughly 2 weeks for reevaluation, then tentative plan for colonoscopy in 4 to 6 weeks followed by elective colectomy    Isidro Stanley MD  [facility] ([facility]  02/20/23  13:35 EST

## 2023-02-20 NOTE — PROGRESS NOTES
Heidy Whitman  [id number]  1970   LOS: 0 days   Patient Care Team:  Itzel Long MD as PCP - General (Family Medicine)    Chief Complaint:  CHEST PAIN    Subjective     Comfortable this morning off supplemental oxygen therapy.  No recurrent chest pain, increased tachypnea/dyspnea, nausea, emesis, abdominal pain, headache, or focal motor-sensory changes.    Objective     Vital Sign Min/Max for last 24 hours  Temp  Min: 97.9 °F (36.6 °C)  Max: 98.6 °F (37 °C)   BP  Min: 104/68  Max: 133/80   Pulse  Min: 63  Max: 73   Resp  Min: 16  Max: 18   SpO2  Min: 93 %  Max: 98 %               02/16/23  1553 02/16/23  2208   Weight: 92.1 kg (203 lb) 94.9 kg (209 lb 3.2 oz)       No intake or output data in the 24 hours ending 02/20/23 0743    Physical Exam:     General Appearance:    Alert, cooperative, in no acute distress   Lungs:     Clear to auscultation,respirations regular, even and                   unlabored    Heart:    Regular and normal rate, normal S1 and S2, no            murmur, no gallop, no rub, no click   Abdomen:  Extremities:   Soft, non-tender, bowel sounds audible x4    No edema, normal range of motion   Pulses:   Pulses palpable and equal bilaterally      Results Review:   Results from last 7 days   Lab Units 02/19/23  1631 02/19/23  0312 02/18/23  0256 02/17/23  0344   SODIUM mmol/L  --  142 138 138   POTASSIUM mmol/L 3.6 3.2* 3.3* 4.2   CHLORIDE mmol/L  --  102 98 101   CO2 mmol/L  --  31.0* 28.0 27.0   BUN mg/dL  --  10 14 14   CREATININE mg/dL  --  0.87 0.73 0.77   GLUCOSE mg/dL  --  89 83 93   CALCIUM mg/dL  --  8.6 9.0 9.0     Results from last 7 days   Lab Units 02/19/23  0312 02/18/23  0256 02/17/23  0344   WBC 10*3/mm3 4.47 5.94 6.73   HEMOGLOBIN g/dL 13.5 14.9 13.6   HEMATOCRIT % 39.8 45.6 41.0   PLATELETS 10*3/mm3 246 279 256     Results from last 7 days   Lab Units 02/17/23  0344   CHOLESTEROL mg/dL 144   TRIGLYCERIDES mg/dL 137   HDL CHOL mg/dL 42   LDL CHOL mg/dL 78     Results  from last 7 days   Lab Units 02/17/23  0344   HEMOGLOBIN A1C % 5.90*     Results from last 7 days   Lab Units 02/18/23  0937 02/18/23  0730 02/17/23  0344   HSTROP T ng/L 11* 7 <6     · NO NEW CXR / EKG / LAB RESULTS.    · ABDOMINOPELVIC CT SCAN WITH CONTRAST (2/19/2023):    Findings:    There is minimal left basilar atelectasis. There is fatty infiltration of the liver. No focal hepatic mass identified. Gallbladder is normal. No evidence biliary tract obstruction. Pancreas and spleen are within normal limits. Bilateral adrenal glands   are normal. Kidneys appear within normal limits bilaterally. No evidence hydronephrosis. Upper GI tract is within normal limits. There is no abdominal or retroperitoneal adenopathy. No free intraperitoneal fluid or air.     Pelvis: Urinary bladder is within normal limits. Again seen are multiple sigmoid diverticula. There is some mild bowel wall thickening similar to prior exam within the proximal sigmoid colon. No fat stranding identified. No evidence of bowel obstruction.   Remaining portions of the colon are within normal limits. The appendix not visualized and may be surgically absent. Patient is status post hysterectomy. There is no pelvic or inguinal adenopathy.     There are degenerative changes of the spine. There are no lytic or sclerotic bony lesions identified.     IMPRESSIONS:    1. Persistent mild bowel wall thickening involving the mid sigmoid colon which is nonspecific. There are multiple colonic diverticuli this region and this could be residual thickening from prior diverticulitis. There is no pericolonic fat stranding at   this time to suggest acute diverticulitis. No evidence of perforation or abscess. No bowel obstruction. Consider colonoscopy for further evaluation given the circumferential bowel wall thickening in this region.  2. Hepatic steatosis.    Medication Review: REVIEWED; DRIP = normal saline 75 cc/hour continuous infusion.    Assessment & Plan      Cardiac status appears stable.  Scheduled for PET myocardial perfusion study this morning and she can resume diet upon return to floor.  We will continue current cardiac medications otherwise.      Diverticulitis    HTN (hypertension)    HLD (hyperlipidemia)    Hypothyroidism (acquired)    Rheumatoid arthritis involving multiple sites (HCC)    Other chest pain    BLAKE (dyspnea on exertion)    Current use of steroid medication    02/20/23  07:43 EST

## 2023-02-20 NOTE — PROGRESS NOTES
"    [facility] Medicine Services  PROGRESS NOTE    Patient Name: Heidy Whitman  : 1970  MRN: [id number]    Date of Admission: 2023  Primary Care Physician: Itzel Long MD    Subjective   Subjective     CC:  F/U recurrent diverticulitis    HPI:  Patient seen and examined. Pt had stress test this AM, feels like she \"ran a marathon.\" PICC placed this AM. Tolerating her clear liquids.     ROS:  Gen- No fevers, chills  CV- no chest pain, no palpitations  Resp- No cough, dyspnea  GI- No N/V, +LLQ abd pain, +diarrhea     Objective   Objective     Vital Signs:   Temp:  [97.9 °F (36.6 °C)-98.3 °F (36.8 °C)] 98.1 °F (36.7 °C)  Heart Rate:  [62-77] 62  Resp:  [16-18] 16  BP: (104-133)/(68-90) 117/70     Physical Exam:  Constitutional: No acute distress, awake, alert  HENT: NCAT, mucous membranes moist  Respiratory: Clear to auscultation bilaterally, respiratory effort normal   Cardiovascular: RRR, no murmurs, rubs, or gallops  Gastrointestinal: Positive bowel sounds, soft, non-distended, +TTP LLQ, no rigidity, rebound or guarding   Musculoskeletal: No bilateral ankle edema  Psychiatric: Appropriate affect, cooperative  Neurologic: Oriented x 3, no focal deficits, speech clear  Skin: No rashes    Results Reviewed:  LAB RESULTS:      Lab 23  0722 23  0312 23  0256 23  0344 23  1858 23  1627   WBC 4.01 4.47 5.94 6.73  --  8.22   HEMOGLOBIN 12.9 13.5 14.9 13.6  --  15.4   HEMATOCRIT 39.0 39.8 45.6 41.0  --  46.4   PLATELETS 229 246 279 256  --  306   NEUTROS ABS 1.66*  --   --  3.36  --  4.90   IMMATURE GRANS (ABS) 0.01  --   --  0.02  --  0.03   LYMPHS ABS 1.58  --   --  2.46  --  2.28   MONOS ABS 0.64  --   --  0.76  --  0.92*   EOS ABS 0.10  --   --  0.10  --  0.06   MCV 92.2 90.5 91.4 91.5  --  90.1   SED RATE  --   --   --   --   --  36*   CRP  --   --   --   --  1.40*  --    PROCALCITONIN 0.03  --   --   --   --  0.06   LACTATE  --   --   --   " --   --  1.2         Lab 02/20/23  0722 02/19/23  1631 02/19/23  0312 02/18/23  0256 02/17/23  0344 02/16/23  1630 02/16/23  1627   SODIUM 140  --  142 138 138  --  141   POTASSIUM 4.2 3.6 3.2* 3.3* 4.2  --  4.1   CHLORIDE 107  --  102 98 101  --  102   CO2 26.0  --  31.0* 28.0 27.0  --  27.0   ANION GAP 7.0  --  9.0 12.0 10.0  --  12.0   BUN 8  --  10 14 14  --  13   CREATININE 0.64  --  0.87 0.73 0.77 0.90  0.90 0.91   EGFR 107.8  --  81.3 100.3 94.1  --  77.0   GLUCOSE 82  --  89 83 93  --  106*   CALCIUM 8.5*  --  8.6 9.0 9.0  --  9.2   MAGNESIUM  --   --  2.0  --  2.0  --   --    HEMOGLOBIN A1C  --   --   --   --  5.90*  --   --          Lab 02/16/23  1627   TOTAL PROTEIN 7.3   ALBUMIN 4.5   GLOBULIN 2.8   ALT (SGPT) 68*   AST (SGOT) 52*   BILIRUBIN 0.5   ALK PHOS 101   LIPASE 24         Lab 02/18/23  0937 02/18/23  0730 02/17/23  0344 02/16/23  1858 02/16/23  1627   PROBNP  --   --   --  34.6  --    HSTROP T 11* 7 <6 7 11*         Lab 02/17/23  0344   CHOLESTEROL 144   LDL CHOL 78   HDL CHOL 42   TRIGLYCERIDES 137             Brief Urine Lab Results  (Last result in the past 365 days)      Color   Clarity   Blood   Leuk Est   Nitrite   Protein   CREAT   Urine HCG        02/16/23 1732 Yellow   Clear   Negative   Trace   Negative   Trace                 Microbiology Results Abnormal     Procedure Component Value - Date/Time    Blood Culture - Blood, Hand, Left [[id number]]  (Normal) Collected: 02/16/23 1940    Lab Status: Preliminary result Specimen: Blood from Hand, Left Updated: 02/19/23 2016     Blood Culture No growth at 3 days    Blood Culture - Blood, Arm, Right [[id number]]  (Normal) Collected: 02/16/23 1625    Lab Status: Preliminary result Specimen: Blood from Arm, Right Updated: 02/19/23 1645     Blood Culture No growth at 3 days    Clostridioides difficile Toxin - Stool, Per Rectum [[id number]]  (Normal) Collected: 02/19/23 0921    Lab Status: Final result Specimen: Stool from Per Rectum Updated:  02/19/23 1031    Narrative:      The following orders were created for panel order Clostridioides difficile Toxin - Stool, Per Rectum.  Procedure                               Abnormality         Status                     ---------                               -----------         ------                     Clostridioides difficile...[[id number]]  Normal              Final result                 Please view results for these tests on the individual orders.    Clostridioides difficile Toxin, PCR - Stool, Per Rectum [[id number]]  (Normal) Collected: 02/19/23 0921    Lab Status: Final result Specimen: Stool from Per Rectum Updated: 02/19/23 1031     C. Difficile Toxins by PCR Not Detected    Narrative:      The result indicates the absence of toxigenic C. difficile from stool specimen.           CT Abdomen Pelvis With Contrast    Result Date: 2/19/2023  CT ABDOMEN PELVIS W CONTRAST Date of Exam: 2/19/2023 2:21 PM EST Indication: Diverticulitis, complication suspected. Comparison: 2/16/2023 Technique: Axial CT images were obtained of the abdomen and pelvis following the uneventful intravenous administration of Isovue IV contrast . Reconstructed coronal and sagittal images were also obtained. Automated exposure control and iterative construction methods were used. Findings: There is minimal left basilar atelectasis. There is fatty infiltration of the liver. No focal hepatic mass identified. Gallbladder is normal. No evidence biliary tract obstruction. Pancreas and spleen are within normal limits. Bilateral adrenal glands are normal. Kidneys appear within normal limits bilaterally. No evidence hydronephrosis. Upper GI tract is within normal limits. There is no abdominal or retroperitoneal adenopathy. No free intraperitoneal fluid or air. Pelvis: Urinary bladder is within normal limits. Again seen are multiple sigmoid diverticula. There is some mild bowel wall thickening similar to prior exam within the proximal sigmoid  colon. No fat stranding identified. No evidence of bowel obstruction.  Remaining portions of the colon are within normal limits. The appendix not visualized and may be surgically absent. Patient is status post hysterectomy. There is no pelvic or inguinal adenopathy. There are degenerative changes of the spine. There are no lytic or sclerotic bony lesions identified.     Impression: Impression: 1. Persistent mild bowel wall thickening involving the mid sigmoid colon which is nonspecific. There are multiple colonic diverticuli this region and this could be residual thickening from prior diverticulitis. There is no pericolonic fat stranding at this time to suggest acute diverticulitis. No evidence of perforation or abscess. No bowel obstruction. Consider colonoscopy for further evaluation given the circumferential bowel wall thickening in this region. 2. Hepatic steatosis. Electronically Signed: Minor Valladares  2/19/2023 3:12 PM EST  Workstation ID: [id number]      Results for orders placed during the hospital encounter of 02/16/23    Adult Transthoracic Echo Complete w/ Color, Spectral and Contrast if necessary per protocol    Interpretation Summary  •  Left ventricular ejection fraction appears to be 66 - 70%.  •  Estimated right ventricular systolic pressure from tricuspid regurgitation is normal (<35 mmHg).      I have reviewed the medications:  Scheduled Meds:acetaminophen, 1,000 mg, Intravenous, Once  [START ON 2/21/2023] Alirocumab, 75 mg, Subcutaneous, Once  amitriptyline, 10 mg, Oral, Nightly  diphenhydrAMINE, 25 mg, Oral, Q24H  ertapenem, 1 g, Intravenous, Q24H  FLUoxetine, 40 mg, Oral, Daily  losartan, 100 mg, Oral, Q24H   And  hydroCHLOROthiazide, 25 mg, Oral, Q24H  levothyroxine, 150 mcg, Oral, Q AM  loratadine-pseudoephedrine, 1 tablet, Oral, QAM  metoprolol tartrate, 25 mg, Oral, Daily  sodium chloride, 10 mL, Intravenous, Q12H  sodium chloride, 10 mL, Intravenous, Q12H  vitamin B-12, 1,000 mcg, Oral,  Daily      Continuous Infusions:sodium chloride, 75 mL/hr, Last Rate: 75 mL/hr (02/20/23 0736)      PRN Meds:.•  acetaminophen  •  Calcium Replacement - Initiate Nurse / BPA Driven Protocol  •  diphenhydrAMINE  •  hydrOXYzine  •  Magnesium Standard Dose Replacement - Initiate Nurse / BPA Driven Protocol  •  ondansetron **OR** ondansetron  •  Phosphorus Replacement - Initiate Nurse / BPA Driven Protocol  •  Potassium Replacement - Initiate Nurse / BPA Driven Protocol  •  simethicone  •  sodium chloride  •  sodium chloride  •  sodium chloride  •  sodium chloride  •  sodium chloride  •  tiZANidine  •  traMADol    Assessment & Plan   Assessment & Plan     Active Hospital Problems    Diagnosis  POA   • **Diverticulitis [K57.92]  Yes   • HTN (hypertension) [I10]  Unknown   • HLD (hyperlipidemia) [E78.5]  Unknown   • Hypothyroidism (acquired) [E03.9]  Unknown   • Rheumatoid arthritis involving multiple sites (HCC) [M06.9]  Unknown   • Other chest pain [R07.89]  Unknown   • BLAKE (dyspnea on exertion) [R06.09]  Unknown   • Current use of steroid medication [Z79.52]  Not Applicable      Resolved Hospital Problems   No resolved problems to display.        Brief Hospital Course to date:  Heidy Whitman is a 52 y.o. female with PMH of HTN, hypothyroidism, HLD, RA and diverticulitis (x5) presents to the ED for abdominal pain.    This patient's problems and plans were partially entered by my partner and updated as appropriate by me 02/20/23.     Diverticulitis  -CT abd on admission showed acute uncomplicated sigmoid diverticulitis, location is similar to prior diverticulitis  -Last colonoscopy in 2019 with Dr. iLttle  -Pain control has been an issue due to itching with narcotics. Continue Ultram and premedicating with Atarax   -Antiemetics PRN  -Failed outpatient therapy of Augmentin  -Continue Invanz. ID following, PICC placed this AM.   -Colorectal Surgery following, plan for outpatient elective colectomy  -Continue Clear  liquids, tolerating well, diet advancement per CRS  -C diff negative   -Repeat CT A/P 2/19 with persistent mild bowel wall thickening. No perforation or abscess. Plan to have colonoscopy in ~4 weeks and pending results of evaluation, outpatient colectomy      Chest Pain  -Echo reviewed, EF WNL  -Cardiology following, Stress Test today. Report pending.      Uncontrolled HTN, HLD  -Continue home statin, losartan/HCTZ, metoprolol  -Holding home lasix due to diarrhea to avoid dehydration      RA  -On amitryptiline   -Gets Enbrel and Praluent injects, last was 2/14  -Not taking steroids currently   -Follows with Dr Adarsh Patton      Hypothyroidism  -Continue home synthroid    Hypokalemia  -Replaced per protocol     Seasonal allergies  -Continue home Claritin D    Expected Discharge Location and Transportation: Home   Expected Discharge   Expected Discharge Date and Time     Expected Discharge Date Expected Discharge Time    Feb 21, 2023            DVT prophylaxis:  Mechanical DVT prophylaxis orders are present.          CODE STATUS:   Code Status and Medical Interventions:   Ordered at: 02/16/23 1954     Level Of Support Discussed With:    Patient     Code Status (Patient has no pulse and is not breathing):    CPR (Attempt to Resuscitate)     Medical Interventions (Patient has pulse or is breathing):    Full Support       Annalisa Wood, DO  02/20/23

## 2023-02-20 NOTE — CONSULTS
INFECTIOUS DISEASE CONSULT/INITIAL HOSPITAL VISIT    Heidy Whitman  1970  [id number]    Date of Consult: 2/20/2023    Admission Date: 2/16/2023      Requesting Provider: No ref. provider found  Evaluating Physician: Michael Corcoran MD    Reason for Consultation: Recurrent sigmoid diverticulitis    History of present illness:    Patient is a 52 y.o. female with rheumatoid arthritis on Enbrel therapy, hyperlipidemia, hypothyroidism, and hypertension who is seen today for evaluation of recurrent sigmoid diverticulitis.  She has suffered from at least 4 prior episodes of diverticulitis.  She developed left lower quadrant abdominal pain on 2/2 and was seen by her primary care provider.  A CT scan confirmed sigmoid diverticulitis and she was started on Augmentin.  She transiently improved but after discontinuation of the Augmentin, she developed steady worsening of abdominal pain.  On the evening of 2/15 she developed severe lower abdominal pain and was unable to sleep.  She presented to the emergency room on 2/16.  A follow-up CT scan revealed some persistent sigmoid diverticulitis without abscess.  Laboratory studies revealed a white blood cell count of 8.2, and C-reactive protein of 1.4.  Her abdominal/pelvic CT scans have also revealed to areas of intussusception.  There was no evidence of perforation or abscess.  She has remained on a clear liquid diet and has noted some partial improvement in her lower abdominal pain which has not resolved.  She has difficult IV access and has required repeated peripheral IV placements.  She would like to have a PICC line.  She is an immunocompromised host related to Enbrel therapy for rheumatoid arthritis.  She indicates that she developed itching and transient rash yesterday after receiving oral contrast media and Zosyn.  She then decided that the rash and pruritus had developed after Invanz rather than Zosyn.  She requests antihistamine therapy prior to  Invanz.    Past Medical History:   Diagnosis Date   • Arthritis    • Breast injury     July 2022-right breast hit with car door   • Current use of steroid medication 2/16/2023   • Disease of thyroid gland    • Diverticulitis    • Edema    • Hyperlipidemia    • Hypertension    • Rheumatoid arthritis (HCC)        Past Surgical History:   Procedure Laterality Date   • ABDOMINAL SURGERY     • HYSTERECTOMY      age 30   • OOPHORECTOMY     • THYROID SURGERY         Family History   Problem Relation Age of Onset   • COPD Father    • Heart disease Father    • Breast cancer Paternal Grandmother         age unknown   • Colon cancer Paternal Grandfather    • Ovarian cancer Neg Hx        Social History     Socioeconomic History   • Marital status:    Tobacco Use   • Smoking status: Never   Vaping Use   • Vaping Use: Never used   Substance and Sexual Activity   • Alcohol use: Not Currently     Comment: occasional use   • Drug use: No   • Sexual activity: Defer       Allergies   Allergen Reactions   • Guaifenesin & Derivatives    • Lactose Intolerance (Gi) Unknown - High Severity   • Gabapentin Hives and Rash   • Pregabalin Hives and Rash         Medication:    Current Facility-Administered Medications:   •  acetaminophen (OFIRMEV) injection 1,000 mg, 1,000 mg, Intravenous, Once, Randal Carmen MD  •  acetaminophen (TYLENOL) tablet 1,000 mg, 1,000 mg, Oral, Q6H PRN, Annalisa Wood DO, 1,000 mg at 02/19/23 2024  •  [START ON 2/21/2023] Alirocumab solution auto-injector 75 mg *Patient Supplied*, 75 mg, Subcutaneous, Once, Annalisa Wood,   •  amitriptyline (ELAVIL) tablet 10 mg, 10 mg, Oral, Nightly, [employer]ger Lolis, APRN, 10 mg at 02/19/23 2024  •  Calcium Replacement - Initiate Nurse / BPA Driven Protocol, , Does not apply, PRN, Annalisa Wood, DO  •  diphenhydrAMINE (BENADRYL) capsule 25 mg, 25 mg, Oral, Q6H PRN, Darell, Bethany, APRN, 25 mg at 02/19/23 1329  •  ertapenem (INVanz) 1 g/100 mL 0.9% NS  VTB (mbp), 1 g, Intravenous, Q24H, Annalisa Wood, , 1 g at 02/19/23 1213  •  FLUoxetine (PROzac) capsule 40 mg, 40 mg, Oral, Daily, Jada Boonea, APRN, 40 mg at 02/19/23 0800  •  losartan (COZAAR) tablet 100 mg, 100 mg, Oral, Q24H, 100 mg at 02/19/23 0800 **AND** hydroCHLOROthiazide (HYDRODIURIL) tablet 25 mg, 25 mg, Oral, Q24H, [employer]ger, Lolis, APRN, 25 mg at 02/19/23 0800  •  hydrOXYzine (ATARAX) tablet 25 mg, 25 mg, Oral, TID PRN, Annalisa Wood DO, 25 mg at 02/18/23 1828  •  levothyroxine (SYNTHROID, LEVOTHROID) tablet 150 mcg, 150 mcg, Oral, Q AM, Annalisa Wood DO, 150 mcg at 02/19/23 0759  •  loratadine-pseudoephedrine (CLARITIN-D 24-hour)  MG per 24 hr tablet 1 tablet, 1 tablet, Oral, QAM, Annalisa Wood DO, 1 tablet at 02/19/23 0900  •  Magnesium Standard Dose Replacement - Initiate Nurse / BPA Driven Protocol, , Does not apply, PRIsrael VERA Olivia D, DO  •  metoprolol tartrate (LOPRESSOR) tablet 25 mg, 25 mg, Oral, Daily, Paolo, Lolis, APRN, 25 mg at 02/19/23 0800  •  ondansetron (ZOFRAN) tablet 4 mg, 4 mg, Oral, Q6H PRN **OR** ondansetron (ZOFRAN) injection 4 mg, 4 mg, Intravenous, Q6H PRN, Jada Boonea, APRN, 4 mg at 02/19/23 1329  •  Phosphorus Replacement - Initiate Nurse / BPA Driven Protocol, , Does not apply, PRN, Annalisa Wood, DO  •  Potassium Replacement - Initiate Nurse / BPA Driven Protocol, , Does not apply, PRN, Annalisa Wood, DO  •  simethicone (MYLICON) chewable tablet 80 mg, 80 mg, Oral, 4x Daily PRN, Annalisa Wood DO, 80 mg at 02/18/23 1554  •  sodium chloride 0.9 % flush 10 mL, 10 mL, Intravenous, PRN, Paulo Glover MD  •  sodium chloride 0.9 % flush 10 mL, 10 mL, Intravenous, Q12H, [employer]ger, Lolis, APRN, 10 mL at 02/19/23 2029  •  sodium chloride 0.9 % flush 10 mL, 10 mL, Intravenous, PRN, [employer]ger, Lolis, APRN  •  sodium chloride 0.9 % infusion 40 mL, 40 mL, Intravenous, PRN, [employer]ger, Lolis, APRN  •  sodium chloride 0.9 % infusion, 75 mL/hr,  Intravenous, Continuous, Isidro Stanley MD, Last Rate: 75 mL/hr at 23 0251, 75 mL/hr at 23 025  •  tiZANidine (ZANAFLEX) tablet 4 mg, 4 mg, Oral, Nightly PRN, Paolo Lolis, APRN, 4 mg at 23  •  traMADol (ULTRAM) tablet 50 mg, 50 mg, Oral, Q6H PRN, Annalisa Wood DO, 50 mg at 23 0502  •  vitamin B-12 (CYANOCOBALAMIN) tablet 1,000 mcg, 1,000 mcg, Oral, Daily, [employer]ger, Lolis, APRN, 1,000 mcg at 23 0800    Antibiotics:  Anti-Infectives (From admission, onward)    Ordered     Dose/Rate Route Frequency Start Stop    23 1050  ertapenem (INVanz) 1 g/100 mL 0.9% NS VTB (mbp)        Ordering Provider: Annalisa Wood DO    1 g  over 30 Minutes Intravenous Every 24 Hours 23 1200 23 1159    23 1829  piperacillin-tazobactam (ZOSYN) 3.375 g in iso-osmotic dextrose 50 ml (premix)        Ordering Provider: Paulo Glover MD    3.375 g Intravenous Once 23 1831 23            Review of Systems:  Constitutional--malaise, fatigue, and chills.  Her maximum temperature prior to admission was 99.2 degrees.  HEENT--she has mild diffuse headache.  She denies sore throat and earache.  CV-- No chest pain, palpitation or syncope  Resp-- No SOB/cough  GI-recurrent diverticulitis with lower abdominal pain, nausea, and anorexia.  -- No dysuria, hematuria, or flank pain.  Denies hesitancy, urgency or flank pain.  Heme- No active bruising or bleeding;  MS--history of rheumatoid arthritis on Enbrel therapy  Neuro-- No acute focal weakness or numbness in the arms or legs.    Skin--she had a transient rash after receiving oral contrast media and antibiotic therapy yesterday.      Physical Exam:   Vital Signs  Temp (24hrs), Av.2 °F (36.8 °C), Min:97.9 °F (36.6 °C), Max:98.6 °F (37 °C)    Temp  Min: 97.9 °F (36.6 °C)  Max: 98.6 °F (37 °C)  BP  Min: 104/68  Max: 133/80  Pulse  Min: 63  Max: 73  Resp  Min: 16  Max: 18  SpO2  Min: 93 %  Max: 98  %    GENERAL: Awake and alert, in no acute distress.   HEENT: Normocephalic, atraumatic.  PERRL. EOMI. No conjunctival injection. No icterus. Oropharynx clear without evidence of thrush or exudate. No evidence of peridontal disease.    NECK: Supple without nuchal rigidity.  LYMPH: No cervical, axillary or inguinal lymphadenopathy.  HEART: RRR; No murmur, rubs, gallops.   LUNGS: Clear to auscultation bilaterally without wheezing, rales, rhonchi. Normal respiratory effort. Nonlabored.   ABDOMEN: Mild lower abdominal tenderness with no rebound tenderness   EXT:  No cyanosis, clubbing or edema.   :  Without Cee catheter.  MSK: No joint effusions or erythema  SKIN: Warm and dry without cutaneous eruptions on Inspection/palpation.    NEURO: Oriented to PPT.  Motor 5/5 strength  PSYCHIATRIC: Normal insight and judgment. Cooperative with PE    Laboratory Data    Results from last 7 days   Lab Units 02/19/23  0312 02/18/23  0256 02/17/23  0344   WBC 10*3/mm3 4.47 5.94 6.73   HEMOGLOBIN g/dL 13.5 14.9 13.6   HEMATOCRIT % 39.8 45.6 41.0   PLATELETS 10*3/mm3 246 279 256     Results from last 7 days   Lab Units 02/19/23  1631 02/19/23  0312   SODIUM mmol/L  --  142   POTASSIUM mmol/L 3.6 3.2*   CHLORIDE mmol/L  --  102   CO2 mmol/L  --  31.0*   BUN mg/dL  --  10   CREATININE mg/dL  --  0.87   GLUCOSE mg/dL  --  89   CALCIUM mg/dL  --  8.6     Results from last 7 days   Lab Units 02/16/23  1627   ALK PHOS U/L 101   BILIRUBIN mg/dL 0.5   ALT (SGPT) U/L 68*   AST (SGOT) U/L 52*     Results from last 7 days   Lab Units 02/16/23  1627   SED RATE mm/hr 36*     Results from last 7 days   Lab Units 02/16/23  1858   CRP mg/dL 1.40*     Results from last 7 days   Lab Units 02/16/23  1627   LACTATE mmol/L 1.2             Estimated Creatinine Clearance: 89.8 mL/min (by C-G formula based on SCr of 0.87 mg/dL).      Microbiology:  Blood Culture   Date Value Ref Range Status   02/16/2023 No growth at 3 days  Preliminary     No results  found for: BCIDPCR, CXREFLEX, CSFCX, CULTURETIS  No results found for: CULTURES, HSVCX, URCX  No results found for: EYECULTURE, GCCX, HSVCULTURE, LABHSV  No results found for: LEGIONELLA, MRSACX, MUMPSCX, MYCOPLASCX  No results found for: NOCARDIACX, STOOLCX  No results found for: THROATCX, UNSTIMCULT, URINECX, CULTURE, VZVCULTUR  No results found for: VIRALCULTU, WOUNDCX        Radiology:  Imaging Results (Last 72 Hours)     Procedure Component Value Units Date/Time    CT Abdomen Pelvis With Contrast [[id number]] Collected: 02/19/23 1508     Updated: 02/19/23 1515    Narrative:      CT ABDOMEN PELVIS W CONTRAST    Date of Exam: 2/19/2023 2:21 PM EST    Indication: Diverticulitis, complication suspected.    Comparison: 2/16/2023    Technique: Axial CT images were obtained of the abdomen and pelvis following the uneventful intravenous administration of Isovue IV contrast . Reconstructed coronal and sagittal images were also obtained. Automated exposure control and iterative   construction methods were used.    Findings:  There is minimal left basilar atelectasis. There is fatty infiltration of the liver. No focal hepatic mass identified. Gallbladder is normal. No evidence biliary tract obstruction. Pancreas and spleen are within normal limits. Bilateral adrenal glands   are normal. Kidneys appear within normal limits bilaterally. No evidence hydronephrosis. Upper GI tract is within normal limits. There is no abdominal or retroperitoneal adenopathy. No free intraperitoneal fluid or air.    Pelvis: Urinary bladder is within normal limits. Again seen are multiple sigmoid diverticula. There is some mild bowel wall thickening similar to prior exam within the proximal sigmoid colon. No fat stranding identified. No evidence of bowel obstruction.   Remaining portions of the colon are within normal limits. The appendix not visualized and may be surgically absent. Patient is status post hysterectomy. There is no pelvic or  inguinal adenopathy.    There are degenerative changes of the spine. There are no lytic or sclerotic bony lesions identified.      Impression:      Impression:    1. Persistent mild bowel wall thickening involving the mid sigmoid colon which is nonspecific. There are multiple colonic diverticuli this region and this could be residual thickening from prior diverticulitis. There is no pericolonic fat stranding at   this time to suggest acute diverticulitis. No evidence of perforation or abscess. No bowel obstruction. Consider colonoscopy for further evaluation given the circumferential bowel wall thickening in this region.  2. Hepatic steatosis.    Electronically Signed: Minor Sandraens    2023 3:12 PM EST    Workstation ID: [id number]      I reviewed her radiographic images with Dr. Miranda in radiology.      Impression:   1.  Sigmoid diverticulitis-she has recurrent sigmoid diverticulitis and failed to respond to oral antibiotic therapy.  She has now partially improved after intravenous Zosyn followed by Invanz.  She will require a course of intravenous antibiotic therapy and will ultimately require surgical resection.  We will need to hold her Enbrel therapy until after surgical intervention.  2.  Poor intravascular access- she has poor intravascular access and required placement of a PICC line.  3.  Intussusception- she has 2 areas of intussusception on CT scan but this is of questionable clinical significance.  4.  Diarrhea-with a negative stool C. difficile toxin  5.  Rheumatoid arthritis-on Enbrel therapy      PLAN/RECOMMENDATIONS:   Thank you for asking us to see Heidy Whitman, I recommend the followin.  Blood cultures-pending  2.  PICC line placement  3.  Invanz 1 g IV daily  4.  Possible discharge tomorrow with acute outpatient care in our office  5.  Hold Enbrel therapy  6.  Benadryl therapy prior to Invanz    I discussed her complex situation with Dr. Stanley today.  I discussed her complex  situation with Dr. Wood today.  I discussed her complex situation with [facility] today.       Michael Corcoran MD  2/20/2023  07:08 EST

## 2023-02-20 NOTE — PLAN OF CARE
Goal Outcome Evaluation:      Pt A&Ox4, on RA. No c/o nausea. Controlling pt's pain level. Sleeping well overnight. NPO for stress test in AM.           Problem: Adult Inpatient Plan of Care  Goal: Plan of Care Review  Outcome: Ongoing, Progressing  Goal: Patient-Specific Goal (Individualized)  Outcome: Ongoing, Progressing  Goal: Absence of Hospital-Acquired Illness or Injury  Outcome: Ongoing, Progressing  Intervention: Identify and Manage Fall Risk  Recent Flowsheet Documentation  Taken 2/20/2023 0000 by Joy Vaz RN  Safety Promotion/Fall Prevention:   activity supervised   clutter free environment maintained   fall prevention program maintained   nonskid shoes/slippers when out of bed   room organization consistent   safety round/check completed  Taken 2/19/2023 2205 by Joy Vaz RN  Safety Promotion/Fall Prevention:   activity supervised   clutter free environment maintained   fall prevention program maintained   nonskid shoes/slippers when out of bed   room organization consistent   safety round/check completed  Taken 2/19/2023 2030 by Joy Vaz RN  Safety Promotion/Fall Prevention:   activity supervised   clutter free environment maintained   fall prevention program maintained   nonskid shoes/slippers when out of bed   room organization consistent   safety round/check completed  Intervention: Prevent Skin Injury  Recent Flowsheet Documentation  Taken 2/20/2023 0000 by Joy Vaz RN  Body Position: position changed independently  Taken 2/19/2023 2205 by Joy Vaz RN  Body Position: position changed independently  Skin Protection:   adhesive use limited   incontinence pads utilized   transparent dressing maintained   tubing/devices free from skin contact  Taken 2/19/2023 2030 by Joy Vaz RN  Body Position: position changed independently  Intervention: Prevent and Manage VTE (Venous Thromboembolism) Risk  Recent Flowsheet Documentation  Taken 2/20/2023 0000 by Joy Vaz  RN  Activity Management: up ad rosalee  Taken 2/19/2023 2205 by Joy Vaz RN  Activity Management: up ad rosalee  VTE Prevention/Management: (ambulation) other (see comments)  Taken 2/19/2023 2030 by Joy Vaz RN  Activity Management: up ad rosalee  Intervention: Prevent Infection  Recent Flowsheet Documentation  Taken 2/20/2023 0000 by Joy Vaz RN  Infection Prevention:   environmental surveillance performed   rest/sleep promoted   single patient room provided  Taken 2/19/2023 2205 by Joy Vaz RN  Infection Prevention:   environmental surveillance performed   single patient room provided   rest/sleep promoted  Taken 2/19/2023 2030 by Joy Vaz RN  Infection Prevention:   environmental surveillance performed   single patient room provided   rest/sleep promoted  Goal: Optimal Comfort and Wellbeing  Outcome: Ongoing, Progressing  Intervention: Monitor Pain and Promote Comfort  Recent Flowsheet Documentation  Taken 2/19/2023 2205 by Joy Vaz RN  Pain Management Interventions:   quiet environment facilitated   relaxation techniques promoted  Taken 2/19/2023 2030 by Joy Vaz RN  Pain Management Interventions:   quiet environment facilitated   relaxation techniques promoted  Intervention: Provide Person-Centered Care  Recent Flowsheet Documentation  Taken 2/19/2023 2030 by Joy Vaz RN  Trust Relationship/Rapport:   care explained   choices provided   empathic listening provided   questions answered   questions encouraged  Goal: Readiness for Transition of Care  Outcome: Ongoing, Progressing     Problem: Pain Acute  Goal: Acceptable Pain Control and Functional Ability  Outcome: Ongoing, Progressing  Intervention: Develop Pain Management Plan  Recent Flowsheet Documentation  Taken 2/19/2023 2205 by Joy Vaz RN  Pain Management Interventions:   quiet environment facilitated   relaxation techniques promoted  Taken 2/19/2023 2030 by Joy Vaz RN  Pain Management Interventions:    quiet environment facilitated   relaxation techniques promoted  Intervention: Optimize Psychosocial Wellbeing  Recent Flowsheet Documentation  Taken 2/19/2023 2030 by Joy Vaz, RN  Diversional Activities: smartphone

## 2023-02-21 ENCOUNTER — READMISSION MANAGEMENT (OUTPATIENT)
Dept: CALL CENTER | Facility: HOSPITAL | Age: 53
End: 2023-02-21
Payer: COMMERCIAL

## 2023-02-21 VITALS
HEIGHT: 66 IN | OXYGEN SATURATION: 96 % | HEART RATE: 67 BPM | SYSTOLIC BLOOD PRESSURE: 134 MMHG | RESPIRATION RATE: 16 BRPM | BODY MASS INDEX: 33.62 KG/M2 | WEIGHT: 209.2 LBS | DIASTOLIC BLOOD PRESSURE: 78 MMHG | TEMPERATURE: 98.5 F

## 2023-02-21 LAB
BACTERIA SPEC AEROBE CULT: NORMAL
BACTERIA SPEC AEROBE CULT: NORMAL

## 2023-02-21 PROCEDURE — 25010000002 ERTAPENEM PER 500 MG: Performed by: INTERNAL MEDICINE

## 2023-02-21 PROCEDURE — 63710000001 DIPHENHYDRAMINE PER 50 MG: Performed by: INTERNAL MEDICINE

## 2023-02-21 PROCEDURE — 99214 OFFICE O/P EST MOD 30 MIN: CPT

## 2023-02-21 PROCEDURE — 99239 HOSP IP/OBS DSCHRG MGMT >30: CPT | Performed by: INTERNAL MEDICINE

## 2023-02-21 PROCEDURE — G0378 HOSPITAL OBSERVATION PER HR: HCPCS

## 2023-02-21 RX ORDER — DIPHENHYDRAMINE HCL 25 MG
25 CAPSULE ORAL EVERY 6 HOURS PRN
Qty: 60 CAPSULE | Refills: 0 | Status: SHIPPED | OUTPATIENT
Start: 2023-02-21 | End: 2023-03-23

## 2023-02-21 RX ORDER — HYDROXYZINE HYDROCHLORIDE 25 MG/1
25 TABLET, FILM COATED ORAL 3 TIMES DAILY PRN
Qty: 15 TABLET | Refills: 0 | Status: SHIPPED | OUTPATIENT
Start: 2023-02-21 | End: 2023-02-26

## 2023-02-21 RX ORDER — TRAMADOL HYDROCHLORIDE 50 MG/1
50 TABLET ORAL EVERY 6 HOURS PRN
Qty: 12 TABLET | Refills: 0 | Status: SHIPPED | OUTPATIENT
Start: 2023-02-21 | End: 2023-02-24

## 2023-02-21 RX ADMIN — FLUOXETINE 40 MG: 20 CAPSULE ORAL at 08:52

## 2023-02-21 RX ADMIN — Medication 10 ML: at 08:52

## 2023-02-21 RX ADMIN — TRAMADOL HYDROCHLORIDE 50 MG: 50 TABLET, COATED ORAL at 13:21

## 2023-02-21 RX ADMIN — SODIUM CHLORIDE 75 ML/HR: 9 INJECTION, SOLUTION INTRAVENOUS at 02:57

## 2023-02-21 RX ADMIN — LOSARTAN POTASSIUM 100 MG: 50 TABLET, FILM COATED ORAL at 08:51

## 2023-02-21 RX ADMIN — CYANOCOBALAMIN TAB 1000 MCG 1000 MCG: 1000 TAB at 08:52

## 2023-02-21 RX ADMIN — ACETAMINOPHEN 1000 MG: 500 TABLET ORAL at 14:50

## 2023-02-21 RX ADMIN — ERTAPENEM 1 G: 1 INJECTION INTRAMUSCULAR; INTRAVENOUS at 08:51

## 2023-02-21 RX ADMIN — ALIROCUMAB 75 MG: 75 INJECTION, SOLUTION SUBCUTANEOUS at 08:52

## 2023-02-21 RX ADMIN — DIPHENHYDRAMINE HYDROCHLORIDE 25 MG: 25 CAPSULE ORAL at 08:51

## 2023-02-21 RX ADMIN — HYDROCHLOROTHIAZIDE 25 MG: 25 TABLET ORAL at 08:52

## 2023-02-21 RX ADMIN — METOPROLOL TARTRATE 25 MG: 25 TABLET, FILM COATED ORAL at 08:51

## 2023-02-21 RX ADMIN — PSEUDOEPHEDRINE SULFATE, LORATADINE 1 TABLET: 10; 240 TABLET, FILM COATED ORAL at 06:01

## 2023-02-21 RX ADMIN — ACETAMINOPHEN 1000 MG: 500 TABLET ORAL at 06:01

## 2023-02-21 RX ADMIN — LEVOTHYROXINE SODIUM 150 MCG: 150 TABLET ORAL at 06:01

## 2023-02-21 NOTE — CONSULTS
Clinical Nutrition     Nutrition Education   Reason for Visit:   Patient request/pt family request       Patient Name: Heidy Whitman  YOB: 1970  MRN: [id number]  Date of Encounter: 02/21/23 16:48 EST  Admission date: 2/16/2023      Applicable Diagnoses     Diverticular dz, and per pt IBSt    Diet/Nutrition Related History:     Pt rpt she has worked with an RD to identify foods that trigger symptoms. Now lists many dairy products, although can tolerate kefir, Premier Protein, small amounts of cheese. Also does not pete fresh eggs but can use egg beaters. Does not pete wheat or food dyes.   Pt allows will use suggestions to help follow liquid diet/GI soft diet as directed by MD and anticipates repeat colonoscopy, likely sgy in 2 weeks.    Labs reviewed   Yes    Nutrition Diagnosis   2/21  Problem Food and nutrition knowledge deficit   Related To [place] diet    Signs/Symptoms reported   Status: pt w guidelines to use    Goal:     Increase knowledge on diet/nutrition effects on condition/status     Nutrition Intervention     Education provided regarding nutrition therapy for: Diet rationale, Key food habit change, IBS and Other liquid diet.    Education provided regarding food habits/behavior related to:Food choices and Food preparation     RD provided printed material via [employer] JAMELN created education material    Provided suppl samples.    Monitoring/Evaluation:     Pt acknowledged understanding of material covered      Brea Moulton RD  Time Spent: 30 min

## 2023-02-21 NOTE — ACP (ADVANCE CARE PLANNING)
Patient completed a living will, FAXed to HIM and placed in chartlette. Notarized living will for patient.

## 2023-02-21 NOTE — NURSING NOTE
[place] [facility] Education Progress Note    Heidy Whitman  1970 - female    Education Date:  02/21/23  Provider: Deloris Boredn RN  Location:  [facility]      Summary of Education Session:  Met with Heidy Whitman to review assembly and administration of IV antibiotic.  Demonstrated with proficiency.  Reviewed side effects of medication, care and complications of central line, use of probiotics, and 24 hour availability of RN for support.  Reviewed appointment date and time to include lab arrangements and providing supplies at each appointment.    Anticipated Discharge Date: 2/21/23  Follow Up Date: 2/23/23  Deloris Borden RN, 02/21/23 - 15:20 EST

## 2023-02-21 NOTE — PROGRESS NOTES
"  [facility] at [facility]  PROGRESS NOTE    Date of Admission: 2/16/2023  Date of Service: 02/21/23    Primary Care Physician: Itzel Long MD    Chief Complaint: chest pain      Subjective      HPI: Patient denies any further chest pain, shortness of breath or palpitations.       Objective   Vitals: /78 (BP Location: Left arm, Patient Position: Lying)   Pulse 73   Temp 98.1 °F (36.7 °C) (Oral)   Resp 18   Ht 167.6 cm (66\")   Wt 94.9 kg (209 lb 3.2 oz)   SpO2 96%   BMI 33.77 kg/m²     Physical Exam:   GENERAL: Alert, cooperative, in no acute distress.   HEENT: Normocephalic, no jugular venous distention  HEART: Regular rate and rhythm; no murmurs, rubs or gallops  LUNGS: Clear to auscultation bilaterally. No wheezing, rales or rhonchi. Nonlabored breathing  NEUROLOGIC: No gross focal abnormalities  EXTREMITIES: No obvious deformities, cyanosis, or edema noted.   PSYCH: normal mood, behavior    Results:  Results from last 7 days   Lab Units 02/20/23  0722 02/19/23  0312 02/18/23  0256   WBC 10*3/mm3 4.01 4.47 5.94   HEMOGLOBIN g/dL 12.9 13.5 14.9   HEMATOCRIT % 39.0 39.8 45.6   PLATELETS 10*3/mm3 229 246 279     Results from last 7 days   Lab Units 02/20/23  0722 02/19/23  1631 02/19/23  0312 02/18/23  0256   SODIUM mmol/L 140  --  142 138   POTASSIUM mmol/L 4.2 3.6 3.2* 3.3*   CHLORIDE mmol/L 107  --  102 98   CO2 mmol/L 26.0  --  31.0* 28.0   BUN mg/dL 8  --  10 14   CREATININE mg/dL 0.64  --  0.87 0.73   GLUCOSE mg/dL 82  --  89 83      Lab Results   Component Value Date    CHOL 144 02/17/2023    TRIG 137 02/17/2023    HDL 42 02/17/2023    LDL 78 02/17/2023    AST 52 (H) 02/16/2023    ALT 68 (H) 02/16/2023     Results from last 7 days   Lab Units 02/17/23  0344   HEMOGLOBIN A1C % 5.90*     Results from last 7 days   Lab Units 02/17/23  0344   CHOLESTEROL mg/dL 144   TRIGLYCERIDES mg/dL 137   HDL CHOL mg/dL 42   LDL CHOL mg/dL 78                 Results from last 7 days "   Lab Units 02/18/23  0937 02/18/23  0730 02/17/23  0344   HSTROP T ng/L 11* 7 <6     Results from last 7 days   Lab Units 02/16/23  1858   PROBNP pg/mL 34.6         Intake/Output Summary (Last 24 hours) at 2/21/2023 0758  Last data filed at 2/20/2023 1817  Gross per 24 hour   Intake 480 ml   Output --   Net 480 ml       I personally reviewed the patient's EKG/Telemetry data    Radiology Data: no new data    Cardiac Investigations:  Stress PET 2/21/23:  •  Lexiscan stress test completed without complications.  •  The test is negative for anginal chest pain or diagnostic ST segment changes.  Resting nonspecific ST segment abnormalities were noted.  •  Myocardial perfusion imaging indicates a normal myocardial perfusion study with no evidence of ischemia.  •  Left ventricular ejection fraction is hyperdynamic (Calculated EF > 70%).  •  Impressions are consistent with a low risk study.    Echo 2/17/23:  •  Left ventricular ejection fraction appears to be 66 - 70%.  •  Estimated right ventricular systolic pressure from tricuspid regurgitation is normal (<35 mmHg).        Current Medications:  Alirocumab, 75 mg, Subcutaneous, Once  amitriptyline, 10 mg, Oral, Nightly  diphenhydrAMINE, 25 mg, Oral, Q24H  ertapenem, 1 g, Intravenous, Q24H  FLUoxetine, 40 mg, Oral, Daily  losartan, 100 mg, Oral, Q24H   And  hydroCHLOROthiazide, 25 mg, Oral, Q24H  levothyroxine, 150 mcg, Oral, Q AM  loratadine-pseudoephedrine, 1 tablet, Oral, QAM  metoprolol tartrate, 25 mg, Oral, Daily  sodium chloride, 10 mL, Intravenous, Q12H  vitamin B-12, 1,000 mcg, Oral, Daily      sodium chloride, 75 mL/hr, Last Rate: 75 mL/hr (02/21/23 0257)        Assessment and Plan:     Active Hospital Problems    Diagnosis  POA   • **Diverticulitis [K57.92]  Yes   • HTN (hypertension) [I10]  Unknown   • HLD (hyperlipidemia) [E78.5]  Unknown   • Hypothyroidism (acquired) [E03.9]  Unknown   • Rheumatoid arthritis involving multiple sites (HCC) [M06.9]  Unknown   •  Other chest pain [R07.89]  Unknown   • BLAKE (dyspnea on exertion) [R06.09]  Unknown   • Current use of steroid medication [Z79.52]  Not Applicable     · Cardiac status is stable with low risk stress testing and preserved LV function on Echo. Continue current cardiac medications.   · Patient acceptable for discharge with outpatient elective colectomy per GI. Cardiology will follow up on an as needed basis. Call for further questions or recommendations.    Perfecto Bower PA-C

## 2023-02-21 NOTE — PROGRESS NOTES
INFECTIOUS DISEASE Progress Note    Heidy Whitman  1970  [id number]    Admission Date: 2/16/2023      Requesting Provider: No ref. provider found  Evaluating Physician: Michael Corcoran MD    Reason for Consultation: Recurrent sigmoid diverticulitis    History of present illness:    2/20/23: Patient is a 52 y.o. female with rheumatoid arthritis on Enbrel therapy, hyperlipidemia, hypothyroidism, and hypertension who is seen today for evaluation of recurrent sigmoid diverticulitis.  She has suffered from at least 4 prior episodes of diverticulitis.  She developed left lower quadrant abdominal pain on 2/2 and was seen by her primary care provider.  A CT scan confirmed sigmoid diverticulitis and she was started on Augmentin.  She transiently improved but after discontinuation of the Augmentin, she developed steady worsening of abdominal pain.  On the evening of 2/15 she developed severe lower abdominal pain and was unable to sleep.  She presented to the emergency room on 2/16.  A follow-up CT scan revealed some persistent sigmoid diverticulitis without abscess.  Laboratory studies revealed a white blood cell count of 8.2, and C-reactive protein of 1.4.  Her abdominal/pelvic CT scans have also revealed to areas of intussusception.  There was no evidence of perforation or abscess.  She has remained on a clear liquid diet and has noted some partial improvement in her lower abdominal pain which has not resolved.  She has difficult IV access and has required repeated peripheral IV placements.  She would like to have a PICC line.  She is an immunocompromised host related to Enbrel therapy for rheumatoid arthritis.  She indicates that she developed itching and transient rash yesterday after receiving oral contrast media and Zosyn.  She then decided that the rash and pruritus had developed after Invanz rather than Zosyn.  She requests antihistamine therapy prior to Invanz.    2/21/23:She has remained afebrile.   Her white blood cell count yesterday was 4 and her pro-calcitonin was 0.03.  She continues to have some lower abdominal pain especially when defecating.  She partially tolerated a full liquid diet.  Overall, she feels better.  She would like to go home.    Past Medical History:   Diagnosis Date   • Arthritis    • Breast injury     July 2022-right breast hit with car door   • Current use of steroid medication 2/16/2023   • Disease of thyroid gland    • Diverticulitis    • Edema    • Hyperlipidemia    • Hypertension    • Rheumatoid arthritis (HCC)        Past Surgical History:   Procedure Laterality Date   • ABDOMINAL SURGERY     • HYSTERECTOMY      age 30   • OOPHORECTOMY     • THYROID SURGERY         Family History   Problem Relation Age of Onset   • COPD Father    • Heart disease Father    • Breast cancer Paternal Grandmother         age unknown   • Colon cancer Paternal Grandfather    • Ovarian cancer Neg Hx        Social History     Socioeconomic History   • Marital status:    Tobacco Use   • Smoking status: Never   Vaping Use   • Vaping Use: Never used   Substance and Sexual Activity   • Alcohol use: Not Currently     Comment: occasional use   • Drug use: No   • Sexual activity: Defer       Allergies   Allergen Reactions   • Guaifenesin & Derivatives    • Lactose Intolerance (Gi) Unknown - High Severity   • Gabapentin Hives and Rash   • Pregabalin Hives and Rash         Medication:    Current Facility-Administered Medications:   •  acetaminophen (TYLENOL) tablet 1,000 mg, 1,000 mg, Oral, Q6H PRN, Annalisa Wood DO, 1,000 mg at 02/21/23 0601  •  Alirocumab solution auto-injector 75 mg *Patient Supplied*, 75 mg, Subcutaneous, Once, Annalisa Wood, DO  •  amitriptyline (ELAVIL) tablet 10 mg, 10 mg, Oral, Nightly, Lolis Boone APRN, 10 mg at 02/20/23 2015  •  Calcium Replacement - Initiate Nurse / BPA Driven Protocol, , Does not apply, PRN, Annalisa Wood, DO  •  diphenhydrAMINE (BENADRYL)  capsule 25 mg, 25 mg, Oral, Q6H PRN, Darell, Bethany, APRN, 25 mg at 02/19/23 1329  •  diphenhydrAMINE (BENADRYL) capsule 25 mg, 25 mg, Oral, Q24H, Michael Corcoran MD, 25 mg at 02/20/23 1209  •  ertapenem (INVanz) 1 g/100 mL 0.9% NS VTB (mbp), 1 g, Intravenous, Q24H, Michael Corcoran MD, Stopped at 02/20/23 1838  •  FLUoxetine (PROzac) capsule 40 mg, 40 mg, Oral, Daily, Jada Boonea, APRN, 40 mg at 02/20/23 0912  •  losartan (COZAAR) tablet 100 mg, 100 mg, Oral, Q24H, 100 mg at 02/20/23 0912 **AND** hydroCHLOROthiazide (HYDRODIURIL) tablet 25 mg, 25 mg, Oral, Q24H, Lolis Boone, APRN, 25 mg at 02/20/23 0913  •  hydrOXYzine (ATARAX) tablet 25 mg, 25 mg, Oral, TID PRN, Annalisa Wood DO, 25 mg at 02/18/23 1828  •  levothyroxine (SYNTHROID, LEVOTHROID) tablet 150 mcg, 150 mcg, Oral, Q AM, Annalisa Wood DO, 150 mcg at 02/21/23 0601  •  loratadine-pseudoephedrine (CLARITIN-D 24-hour)  MG per 24 hr tablet 1 tablet, 1 tablet, Oral, MJewell Mark J, MD, 1 tablet at 02/21/23 0601  •  Magnesium Standard Dose Replacement - Initiate Nurse / BPA Driven Protocol, , Does not apply, PRN, Annalisa Wood DO  •  metoprolol tartrate (LOPRESSOR) tablet 25 mg, 25 mg, Oral, Daily, Jada Boonea, APRN, 25 mg at 02/20/23 0912  •  ondansetron (ZOFRAN) tablet 4 mg, 4 mg, Oral, Q6H PRN **OR** ondansetron (ZOFRAN) injection 4 mg, 4 mg, Intravenous, Q6H PRN, Lolis Boone APRN, 4 mg at 02/19/23 1329  •  Phosphorus Replacement - Initiate Nurse / BPA Driven Protocol, , Does not apply, PRN, Annalisa Wood DO  •  Potassium Replacement - Initiate Nurse / BPA Driven Protocol, , Does not apply, PRN, Annalisa Wood DO  •  simethicone (MYLICON) chewable tablet 80 mg, 80 mg, Oral, 4x Daily PRN, Annalisa Wood DO, 80 mg at 02/18/23 1554  •  sodium chloride 0.9 % flush 10 mL, 10 mL, Intravenous, PRN, Paulo Glover MD  •  sodium chloride 0.9 % flush 10 mL, 10 mL, Intravenous, Q12H, Lolis Boone APRN,  10 mL at 23 2016  •  sodium chloride 0.9 % infusion 40 mL, 40 mL, Intravenous, PRN, [employer], Lolis, APRN  •  sodium chloride 0.9 % infusion, 75 mL/hr, Intravenous, Continuous, Isidro Stanley MD, Last Rate: 75 mL/hr at 23 025, 75 mL/hr at 23 025  •  tiZANidine (ZANAFLEX) tablet 4 mg, 4 mg, Oral, Nightly PRN, [employer], Lolis, APRN, 4 mg at 23  •  traMADol (ULTRAM) tablet 50 mg, 50 mg, Oral, Q6H PRN, Annalisa Wood DO, 50 mg at 237  •  vitamin B-12 (CYANOCOBALAMIN) tablet 1,000 mcg, 1,000 mcg, Oral, Daily, [employer], Lolis, APRN, 1,000 mcg at 23 0912    Antibiotics:  Anti-Infectives (From admission, onward)    Ordered     Dose/Rate Route Frequency Start Stop    23 0955  ertapenem (INVanz) 1 g/100 mL 0.9% NS VTB (mbp)        Ordering Provider: Michael Corcoran MD    1 g  over 30 Minutes Intravenous Every 24 Hours Scheduled 23 1200 23 0859    23 1829  piperacillin-tazobactam (ZOSYN) 3.375 g in iso-osmotic dextrose 50 ml (premix)        Ordering Provider: Paulo Glover MD    3.375 g Intravenous Once 23 1831 233            Review of Systems:  See HPI      Physical Exam:   Vital Signs  Temp (24hrs), Av.3 °F (36.8 °C), Min:98.1 °F (36.7 °C), Max:98.4 °F (36.9 °C)    Temp  Min: 98.1 °F (36.7 °C)  Max: 98.4 °F (36.9 °C)  BP  Min: 117/70  Max: 151/78  Pulse  Min: 62  Max: 79  Resp  Min: 16  Max: 18  SpO2  Min: 96 %  Max: 96 %    GENERAL: Awake and alert, in no acute distress.   HEENT: Normocephalic, atraumatic.  PERRL. EOMI. No conjunctival injection. No icterus. Oropharynx clear without evidence of thrush or exudate.   NECK: Supple   HEART: RRR; No murmur, rubs, gallops.   LUNGS: Clear to auscultation bilaterally without wheezing, rales, rhonchi. Normal respiratory effort. Nonlabored.   ABDOMEN: Mild lower abdominal tenderness with no rebound tenderness   EXT:  No cyanosis, clubbing or edema.   :  Without Cee  catheter.  MSK: No joint effusions or erythema  SKIN: Warm and dry without cutaneous eruptions on Inspection/palpation.    NEURO: Oriented to PPT.  Motor 5/5 strength  PSYCHIATRIC: Normal insight and judgment. Cooperative with PE  Right arm PICC line has no erythema or drainage    Laboratory Data    Results from last 7 days   Lab Units 02/20/23  0722 02/19/23  0312 02/18/23  0256   WBC 10*3/mm3 4.01 4.47 5.94   HEMOGLOBIN g/dL 12.9 13.5 14.9   HEMATOCRIT % 39.0 39.8 45.6   PLATELETS 10*3/mm3 229 246 279     Results from last 7 days   Lab Units 02/20/23  0722   SODIUM mmol/L 140   POTASSIUM mmol/L 4.2   CHLORIDE mmol/L 107   CO2 mmol/L 26.0   BUN mg/dL 8   CREATININE mg/dL 0.64   GLUCOSE mg/dL 82   CALCIUM mg/dL 8.5*     Results from last 7 days   Lab Units 02/16/23  1627   ALK PHOS U/L 101   BILIRUBIN mg/dL 0.5   ALT (SGPT) U/L 68*   AST (SGOT) U/L 52*     Results from last 7 days   Lab Units 02/16/23  1627   SED RATE mm/hr 36*     Results from last 7 days   Lab Units 02/16/23  1858   CRP mg/dL 1.40*     Results from last 7 days   Lab Units 02/16/23  1627   LACTATE mmol/L 1.2             Estimated Creatinine Clearance: 122 mL/min (by C-G formula based on SCr of 0.64 mg/dL).      Microbiology:  Blood Culture   Date Value Ref Range Status   02/16/2023 No growth at 3 days  Preliminary     No results found for: BCIDPCR, CXREFLEX, CSFCX, CULTURETIS  No results found for: CULTURES, HSVCX, URCX  No results found for: EYECULTURE, GCCX, HSVCULTURE, LABHSV  No results found for: LEGIONELLA, MRSACX, MUMPSCX, MYCOPLASCX  No results found for: NOCARDIACX, STOOLCX  No results found for: THROATCX, UNSTIMCULT, URINECX, CULTURE, VZVCULTUR  No results found for: VIRALCULTU, WOUNDCX        Radiology:  Imaging Results (Last 72 Hours)     Procedure Component Value Units Date/Time    CT Abdomen Pelvis With Contrast [[id number]] Collected: 02/19/23 1508     Updated: 02/19/23 1515    Narrative:      CT ABDOMEN PELVIS W CONTRAST    Date of  Exam: 2/19/2023 2:21 PM EST    Indication: Diverticulitis, complication suspected.    Comparison: 2/16/2023    Technique: Axial CT images were obtained of the abdomen and pelvis following the uneventful intravenous administration of Isovue IV contrast . Reconstructed coronal and sagittal images were also obtained. Automated exposure control and iterative   construction methods were used.    Findings:  There is minimal left basilar atelectasis. There is fatty infiltration of the liver. No focal hepatic mass identified. Gallbladder is normal. No evidence biliary tract obstruction. Pancreas and spleen are within normal limits. Bilateral adrenal glands   are normal. Kidneys appear within normal limits bilaterally. No evidence hydronephrosis. Upper GI tract is within normal limits. There is no abdominal or retroperitoneal adenopathy. No free intraperitoneal fluid or air.    Pelvis: Urinary bladder is within normal limits. Again seen are multiple sigmoid diverticula. There is some mild bowel wall thickening similar to prior exam within the proximal sigmoid colon. No fat stranding identified. No evidence of bowel obstruction.   Remaining portions of the colon are within normal limits. The appendix not visualized and may be surgically absent. Patient is status post hysterectomy. There is no pelvic or inguinal adenopathy.    There are degenerative changes of the spine. There are no lytic or sclerotic bony lesions identified.      Impression:      Impression:    1. Persistent mild bowel wall thickening involving the mid sigmoid colon which is nonspecific. There are multiple colonic diverticuli this region and this could be residual thickening from prior diverticulitis. There is no pericolonic fat stranding at   this time to suggest acute diverticulitis. No evidence of perforation or abscess. No bowel obstruction. Consider colonoscopy for further evaluation given the circumferential bowel wall thickening in this region.  2.  Hepatic steatosis.    Electronically Signed: Minor Valladares    2/19/2023 3:12 PM EST    Workstation ID: [id number]      I reviewed her radiographic images with Dr. Miranda in radiology.      Impression:   1.  Sigmoid diverticulitis-she has recurrent sigmoid diverticulitis and failed to respond to oral antibiotic therapy.  She has now partially improved after intravenous Zosyn followed by Invanz.  We will need to hold her Enbrel therapy until after surgical intervention.  2.  Poor intravascular access- a PICC line was placed yesterday  3.  Intussusception- she has 2 areas of intussusception on CT scan but this is of questionable clinical significance.  4.  Diarrhea-with a negative stool C. difficile toxin  5.  Rheumatoid arthritis-on Enbrel therapy      PLAN/RECOMMENDATIONS:  1.  Continue Invanz 1 g IV daily-moved today's dose from noon to 9 AM  2.  Discharge today on outpatient intravenous Invanz-[facility] will provide     She is clinically improving and should be able to discharge today on outpatient intravenous Invanz.  I discussed her disposition with Dr. Mcneill today.  I discussed her disposition with the RN staff today.  I coordinated her care.  Her complex medical condition required high complexity medical decision making today.    Outpatient orders:  1.  Outpatient intravenous antibiotic therapy: Invanz 1 g IV daily-[facility] will provide  2.  Follow-up with me on Thursday 2/23 at 10:00-this appointment has been made       Michael Corcoran MD  2/21/2023  06:51 EST

## 2023-02-21 NOTE — CASE MANAGEMENT/SOCIAL WORK
Case Management Discharge Note      Final Note: I met with the patient at the bedside regarding discharge plans for today. She is agreeable with returning home and learning to infuse her antibiotics for home infusion. CM called [facility] office who is sending an RN to bedside today to teach and dispense medication. I went over her follow up appointment date and time and it will be in AVS. Her  will transport her home today. She denies any further discharge needs at this time.         Selected Continued Care - Admitted Since 2/16/2023     Destination    No services have been selected for the patient.              Durable Medical Equipment    No services have been selected for the patient.              Dialysis/Infusion Coordination complete.    Service Provider Selected Services Address Phone Fax Patient Preferred    [facility] [address], [facility] [address] [phone] --          Home Medical Care    No services have been selected for the patient.              Therapy    No services have been selected for the patient.              Community Resources    No services have been selected for the patient.              Community & DME    No services have been selected for the patient.                       Final Discharge Disposition Code: 01 - home or self-care

## 2023-02-21 NOTE — DISCHARGE SUMMARY
[facility] Medicine Services  DISCHARGE SUMMARY    Patient Name: Heidy Whitman  : 1970  MRN: [id number]    Date of Admission: 2023  6:29 PM  Date of Discharge: 2023  Primary Care Physician: Itzel Long MD    Consults     Date and Time Order Name Status Description    2023 12:32 AM Inpatient Infectious Diseases Consult Completed     2023 12:34 AM Inpatient Colorectal Surgery Consult Completed     2023 12:34 AM Inpatient Cardiology Consult Completed           Hospital Course     Presenting Problem:   Diverticulitis [K57.92]    Active Hospital Problems    Diagnosis  POA   • **Diverticulitis [K57.92]  Yes   • HTN (hypertension) [I10]  Unknown   • HLD (hyperlipidemia) [E78.5]  Unknown   • Hypothyroidism (acquired) [E03.9]  Unknown   • Rheumatoid arthritis involving multiple sites (HCC) [M06.9]  Unknown   • Other chest pain [R07.89]  Unknown   • BLAKE (dyspnea on exertion) [R06.09]  Unknown   • Current use of steroid medication [Z79.52]  Not Applicable      Resolved Hospital Problems   No resolved problems to display.          Hospital Course:  Heidy Whitman is a 52 y.o. female  with PMH of HTN, hypothyroidism, HLD, RA and diverticulitis (x5) presents to the ED for abdominal pain. CT abd on admission showed acute uncomplicated sigmoid diverticulitis, location is similar to prior diverticulitis.     This patient's problems and plans were partially entered by my partner and updated as appropriate by me 23.     Diverticulitis    -Pain control has been an issue due to itching with narcotics. Continue Ultram and premedicating with Atarax   -Antiemetics PRN  -Failed outpatient therapy of Augmentin  -Continue Invanz, PICC placed .  Has outpatient follow-up with Dr. Corcoran on   -Colorectal Surgery following, plan for outpatient elective colectomy  -Repeat CT A/P  with persistent mild bowel wall thickening. No perforation or abscess. Plan  to have colonoscopy in ~4 weeks and pending results of evaluation, outpatient colectomy      Chest Pain  -Echo reviewed, EF WNL  -Cardiology evaluated, stress test with low risk study     Uncontrolled HTN, HLD  -Continue home regimen     RA  -On amitryptiline   -Gets Enbrel and Praluent injects, last was 2/14  -Enbrel currently on hold, resume when okay with infectious disease and colorectal surgery  -Follows with Dr Adarsh Patton      Hypothyroidism  -Continue home synthroid    Discharge Follow Up Recommendations for outpatient labs/diagnostics:  PCP in 1 week  Infectious disease on 2/23  Colorectal surgery in 2 weeks    Day of Discharge     HPI:   Feeling better, still has intermittent crampy abdominal pain after having bowel movement.    Review of Systems  Gen- No fevers, chills  CV- No chest pain, palpitations  Resp- No cough, dyspnea        Vital Signs:   Temp:  [98.1 °F (36.7 °C)-98.4 °F (36.9 °C)] 98.1 °F (36.7 °C)  Heart Rate:  [62-79] 73  Resp:  [16-18] 18  BP: (117-151)/(70-78) 151/78      Physical Exam:  Constitutional: No acute distress, awake, alert  HENT: NCAT, mucous membranes moist  Respiratory: Respiratory effort normal   Cardiovascular: RRR, no murmurs, rubs, or gallops  Gastrointestinal: Positive bowel sounds, soft, + lower quadrant tenderness  Musculoskeletal: No bilateral ankle edema  Psychiatric: Appropriate affect, cooperative  Neurologic: Oriented x 3, speech clear  Skin: No rashes      Pertinent  and/or Most Recent Results     LAB RESULTS:      Lab 02/20/23  0722 02/19/23  0312 02/18/23  0256 02/17/23  0344 02/16/23  1858 02/16/23  1627   WBC 4.01 4.47 5.94 6.73  --  8.22   HEMOGLOBIN 12.9 13.5 14.9 13.6  --  15.4   HEMATOCRIT 39.0 39.8 45.6 41.0  --  46.4   PLATELETS 229 246 279 256  --  306   NEUTROS ABS 1.66*  --   --  3.36  --  4.90   IMMATURE GRANS (ABS) 0.01  --   --  0.02  --  0.03   LYMPHS ABS 1.58  --   --  2.46  --  2.28   MONOS ABS 0.64  --   --  0.76  --  0.92*   EOS ABS 0.10  --    --  0.10  --  0.06   MCV 92.2 90.5 91.4 91.5  --  90.1   SED RATE  --   --   --   --   --  36*   CRP  --   --   --   --  1.40*  --    PROCALCITONIN 0.03  --   --   --   --  0.06   LACTATE  --   --   --   --   --  1.2         Lab 02/20/23  0722 02/19/23  1631 02/19/23  0312 02/18/23  0256 02/17/23  0344 02/16/23  1630 02/16/23  1627   SODIUM 140  --  142 138 138  --  141   POTASSIUM 4.2 3.6 3.2* 3.3* 4.2  --  4.1   CHLORIDE 107  --  102 98 101  --  102   CO2 26.0  --  31.0* 28.0 27.0  --  27.0   ANION GAP 7.0  --  9.0 12.0 10.0  --  12.0   BUN 8  --  10 14 14  --  13   CREATININE 0.64  --  0.87 0.73 0.77 0.90  0.90 0.91   EGFR 107.8  --  81.3 100.3 94.1  --  77.0   GLUCOSE 82  --  89 83 93  --  106*   CALCIUM 8.5*  --  8.6 9.0 9.0  --  9.2   MAGNESIUM  --   --  2.0  --  2.0  --   --    HEMOGLOBIN A1C  --   --   --   --  5.90*  --   --          Lab 02/16/23  1627   TOTAL PROTEIN 7.3   ALBUMIN 4.5   GLOBULIN 2.8   ALT (SGPT) 68*   AST (SGOT) 52*   BILIRUBIN 0.5   ALK PHOS 101   LIPASE 24         Lab 02/18/23  0937 02/18/23  0730 02/17/23  0344 02/16/23  1858 02/16/23  1627   PROBNP  --   --   --  34.6  --    HSTROP T 11* 7 <6 7 11*         Lab 02/17/23  0344   CHOLESTEROL 144   LDL CHOL 78   HDL CHOL 42   TRIGLYCERIDES 137             Brief Urine Lab Results  (Last result in the past 365 days)      Color   Clarity   Blood   Leuk Est   Nitrite   Protein   CREAT   Urine HCG        02/16/23 1732 Yellow   Clear   Negative   Trace   Negative   Trace               Microbiology Results (last 10 days)     Procedure Component Value - Date/Time    Clostridioides difficile Toxin - Stool, Per Rectum [[id number]]  (Normal) Collected: 02/19/23 0921    Lab Status: Final result Specimen: Stool from Per Rectum Updated: 02/19/23 1031    Narrative:      The following orders were created for panel order Clostridioides difficile Toxin - Stool, Per Rectum.  Procedure                               Abnormality         Status                      ---------                               -----------         ------                     Clostridioides difficile...[[id number]]  Normal              Final result                 Please view results for these tests on the individual orders.    Clostridioides difficile Toxin, PCR - Stool, Per Rectum [[id number]]  (Normal) Collected: 02/19/23 0921    Lab Status: Final result Specimen: Stool from Per Rectum Updated: 02/19/23 1031     C. Difficile Toxins by PCR Not Detected    Narrative:      The result indicates the absence of toxigenic C. difficile from stool specimen.     Blood Culture - Blood, Hand, Left [[id number]]  (Normal) Collected: 02/16/23 1940    Lab Status: Preliminary result Specimen: Blood from Hand, Left Updated: 02/20/23 2015     Blood Culture No growth at 4 days    Blood Culture - Blood, Arm, Right [[id number]]  (Normal) Collected: 02/16/23 1625    Lab Status: Preliminary result Specimen: Blood from Arm, Right Updated: 02/20/23 1645     Blood Culture No growth at 4 days          Adult Transthoracic Echo Complete w/ Color, Spectral and Contrast if necessary per protocol    Result Date: 2/17/2023  •  Left ventricular ejection fraction appears to be 66 - 70%. •  Estimated right ventricular systolic pressure from tricuspid regurgitation is normal (<35 mmHg).     CT Abdomen Pelvis With Contrast    Result Date: 2/19/2023  CT ABDOMEN PELVIS W CONTRAST Date of Exam: 2/19/2023 2:21 PM EST Indication: Diverticulitis, complication suspected. Comparison: 2/16/2023 Technique: Axial CT images were obtained of the abdomen and pelvis following the uneventful intravenous administration of Isovue IV contrast . Reconstructed coronal and sagittal images were also obtained. Automated exposure control and iterative construction methods were used. Findings: There is minimal left basilar atelectasis. There is fatty infiltration of the liver. No focal hepatic mass identified. Gallbladder is normal. No evidence biliary tract  obstruction. Pancreas and spleen are within normal limits. Bilateral adrenal glands are normal. Kidneys appear within normal limits bilaterally. No evidence hydronephrosis. Upper GI tract is within normal limits. There is no abdominal or retroperitoneal adenopathy. No free intraperitoneal fluid or air. Pelvis: Urinary bladder is within normal limits. Again seen are multiple sigmoid diverticula. There is some mild bowel wall thickening similar to prior exam within the proximal sigmoid colon. No fat stranding identified. No evidence of bowel obstruction.  Remaining portions of the colon are within normal limits. The appendix not visualized and may be surgically absent. Patient is status post hysterectomy. There is no pelvic or inguinal adenopathy. There are degenerative changes of the spine. There are no lytic or sclerotic bony lesions identified.     Impression: 1. Persistent mild bowel wall thickening involving the mid sigmoid colon which is nonspecific. There are multiple colonic diverticuli this region and this could be residual thickening from prior diverticulitis. There is no pericolonic fat stranding at this time to suggest acute diverticulitis. No evidence of perforation or abscess. No bowel obstruction. Consider colonoscopy for further evaluation given the circumferential bowel wall thickening in this region. 2. Hepatic steatosis. Electronically Signed: Minor Valladares  2/19/2023 3:12 PM EST  Workstation ID: [id number]    CT Abdomen Pelvis With Contrast    Result Date: 2/16/2023  CT ABDOMEN PELVIS W CONTRAST Date of Exam: 2/16/2023 5:33 PM EST Indication: LLQ pain. Comparison: CT abdomen and pelvis 10/16/2022 Technique: Axial CT images were obtained of the abdomen and pelvis following the uneventful intravenous administration of 100 mL Isovue-300. Reconstructed coronal and sagittal images were also obtained. Automated exposure control and iterative construction methods were used. Findings: Unremarkable appearance  of the lower thorax. There is diffuse hypoattenuation of the liver parenchyma compatible with hepatic steatosis. Unremarkable appearance of the gallbladder and bile ducts. Normal appearance of the spleen, pancreas, adrenal glands,  kidneys, ureters, and decompressed bladder. The uterus is surgically absent. There is a short 4 cm segment of mid sigmoid colonic wall thickening with peridiverticular fat stranding, compatible with acute diverticulitis. No peridiverticular abscess/fluid or extraluminal air to suggest perforation. The location of diverticulitis is similar to that of prior CT from October 2022. Otherwise grossly unremarkable appearance of the GI tract. No abdominopelvic free fluid. No pneumoperitoneum. Normal appearance of the vasculature. No lymphadenopathy. Unremarkable appearance of the body wall soft tissues. No acute or suspicious bony findings.     Impression: Acute uncomplicated sigmoid diverticulitis. The location is similar to prior diverticulitis seen on October 2022 CT. Given recurrent diverticulitis in this area, colonoscopy is recommended to exclude possibility of colonic neoplasm in this area, and correlate with colonoscopy history. Electronically Signed: Keon Bunch  2/16/2023 5:52 PM EST  Workstation ID: [id number]    XR Chest 1 View    Result Date: 2/16/2023  XR CHEST 1 VW Date of Exam: 2/16/2023 8:00 PM EST Indication: chest pain. Comparison: Chest x-ray 8/16/2016 Findings: Normal cardiomediastinal silhouette. The lungs are clear. No pleural effusion or pneumothorax. No acute osseous findings.     Impression: No acute cardiopulmonary findings.  Electronically Signed: Keon Bunch  2/16/2023 8:30 PM EST  Workstation ID: [id number]    Stress Test With Pet Myocardial Perfusion    Result Date: 2/20/2023  •  Lexiscan stress test completed without complications. •  The test is negative for anginal chest pain or diagnostic ST segment changes.  Resting nonspecific ST segment abnormalities were  noted. •  Myocardial perfusion imaging indicates a normal myocardial perfusion study with no evidence of ischemia. •  Left ventricular ejection fraction is hyperdynamic (Calculated EF > 70%). •  Impressions are consistent with a low risk study.               Results for orders placed during the hospital encounter of 02/16/23    Adult Transthoracic Echo Complete w/ Color, Spectral and Contrast if necessary per protocol    Interpretation Summary  •  Left ventricular ejection fraction appears to be 66 - 70%.  •  Estimated right ventricular systolic pressure from tricuspid regurgitation is normal (<35 mmHg).      Plan for Follow-up of Pending Labs/Results:  Pending Labs     Order Current Status    Blood Culture - Blood, Arm, Right Preliminary result    Blood Culture - Blood, Hand, Left Preliminary result        Discharge Details        Discharge Medications      New Medications      Instructions Start Date   diphenhydrAMINE 25 mg capsule  Commonly known as: BENADRYL   25 mg, Oral, Every 6 Hours PRN      ertapenem 1 gm/100ml solution IV  Commonly known as: INVanz   1 g, Intravenous, Every 24 Hours Scheduled      hydrOXYzine 25 MG tablet  Commonly known as: ATARAX   25 mg, Oral, 3 Times Daily PRN      metoprolol tartrate 25 MG tablet  Commonly known as: LOPRESSOR   12.5 mg, Oral, 2 Times Daily      traMADol 50 MG tablet  Commonly known as: ULTRAM   50 mg, Oral, Every 6 Hours PRN         Continue These Medications      Instructions Start Date   amitriptyline 10 MG tablet  Commonly known as: ELAVIL   10 mg, Oral, Nightly      CLARITIN-D 24 HOUR PO   Oral, Daily      FLUoxetine 40 MG capsule  Commonly known as: PROzac   40 mg, Oral, Daily      levothyroxine 150 MCG tablet  Commonly known as: SYNTHROID, LEVOTHROID   150 mcg, Oral, Daily, Pt takes 156 mcg(?)      losartan-hydrochlorothiazide 100-25 MG per tablet  Commonly known as: HYZAAR   1 tablet, Oral, Daily      ondansetron ODT 4 MG disintegrating tablet  Commonly known  as: ZOFRAN-ODT   4 mg, Translingual, Every 6 Hours PRN      potassium chloride 20 MEQ tablet controlled-release ER tablet  Commonly known as: K-DUR,KLOR-CON   80 mEq, Oral, 2 Times Daily      Praluent 75 MG/ML solution auto-injector  Generic drug: Alirocumab   75 mg, Subcutaneous, Every 14 Days, Every other Tuesday (due 2/20)      TiZANidine 4 MG capsule  Commonly known as: ZANAFLEX   4 mg, Oral, Nightly      Vitamin B-12 5000 MCG tablet dispersible   5,000 mcg, Oral, Daily         Stop These Medications    Etanercept 50 MG/ML solution auto-injector     furosemide 20 MG tablet  Commonly known as: LASIX     metoprolol succinate XL 25 MG 24 hr tablet  Commonly known as: TOPROL-XL     omeprazole 40 MG capsule  Commonly known as: priLOSEC     predniSONE 10 MG tablet  Commonly known as: DELTASONE            Allergies   Allergen Reactions   • Guaifenesin & Derivatives    • Lactose Intolerance (Gi) Unknown - High Severity   • Gabapentin Hives and Rash   • Pregabalin Hives and Rash         Discharge Disposition:  Home or Self Care    Diet:  Hospital:  Diet Order   Procedures   • Diet: Liquid Diets; Full Liquid; Texture: Regular Texture (IDDSI 7); Fluid Consistency: Thin (IDDSI 0)       Activity:      Restrictions or Other Recommendations:  Advance diet when ok with CRS       CODE STATUS:    Code Status and Medical Interventions:   Ordered at: 02/16/23 1954     Level Of Support Discussed With:    Patient     Code Status (Patient has no pulse and is not breathing):    CPR (Attempt to Resuscitate)     Medical Interventions (Patient has pulse or is breathing):    Full Support       No future appointments.    Additional Instructions for the Follow-ups that You Need to Schedule     Discharge Follow-up with PCP   As directed       Currently Documented PCP:    Itzel Long MD    PCP Phone Number:    [phone]     Follow Up Details: 1 week         Discharge Follow-up with Specified Provider: Dr Corcoran; 2 Days   As  directed      To: Dr Corcoran    Follow Up: 2 Days         Discharge Follow-up with Specified Provider: Dr Stanley; 2 Weeks   As directed      To: Dr Stanley    Follow Up: 2 Weeks                     Kaitlin Mcneill DO  02/21/23      Time Spent on Discharge:  I spent  39  minutes on this discharge activity which included: face-to-face encounter with the patient, reviewing the data in the system, coordination of the care with the nursing staff as well as consultants, documentation, and entering orders.

## 2023-02-21 NOTE — PROGRESS NOTES
"[facility] ([facility]        Length of stay: 0 days    Subjective: Patient is doing fair today.  States that her abdominal pain is improved with the exception of when she ate potato soup last night.  Did experience some pain then.  Able to palpate her abdomen more today with less tenderness.    Physical Exam:  Blood pressure 151/78, pulse 74, temperature 98.1 °F (36.7 °C), temperature source Oral, resp. rate 18, height 167.6 cm (66\"), weight 94.9 kg (209 lb 3.2 oz), SpO2 96 %.    Abd: Soft, minimally tender suprapubically, nondistended.    Labs past 24 hours:  Lab Results (last 24 hours)     Procedure Component Value Units Date/Time    Blood Culture - Blood, Hand, Left [[id number]]  (Normal) Collected: 02/16/23 1940    Specimen: Blood from Hand, Left Updated: 02/20/23 2015     Blood Culture No growth at 4 days    Blood Culture - Blood, Arm, Right [[id number]]  (Normal) Collected: 02/16/23 1625    Specimen: Blood from Arm, Right Updated: 02/20/23 1645     Blood Culture No growth at 4 days    Procalcitonin [[id number]]  (Normal) Collected: 02/20/23 0722    Specimen: Blood Updated: 02/20/23 0850     Procalcitonin 0.03 ng/mL     Narrative:      As a Marker for Sepsis (Non-Neonates):    1. <0.5 ng/mL represents a low risk of severe sepsis and/or septic shock.  2. >2 ng/mL represents a high risk of severe sepsis and/or septic shock.    As a Marker for Lower Respiratory Tract Infections that require antibiotic therapy:    PCT on Admission    Antibiotic Therapy       6-12 Hrs later    >0.5                Strongly Recommended  >0.25 - <0.5        Recommended   0.1 - 0.25          Discouraged              Remeasure/reassess PCT  <0.1                Strongly Discouraged     Remeasure/reassess PCT    As 28 day mortality risk marker: \"Change in Procalcitonin Result\" (>80% or <=80%) if Day 0 (or Day 1) and Day 4 values are available. Refer to http://www.[employer].s-pct-calculator.com    Change in PCT " <=80%  A decrease of PCT levels below or equal to 80% defines a positive change in PCT test result representing a higher risk for 28-day all-cause mortality of patients diagnosed with severe sepsis for septic shock.    Change in PCT >80%  A decrease of PCT levels of more than 80% defines a negative change in PCT result representing a lower risk for 28-day all-cause mortality of patients diagnosed with severe sepsis or septic shock.       Basic Metabolic Panel [[id number]]  (Abnormal) Collected: 02/20/23 0722    Specimen: Blood Updated: 02/20/23 0850     Glucose 82 mg/dL      BUN 8 mg/dL      Creatinine 0.64 mg/dL      Sodium 140 mmol/L      Potassium 4.2 mmol/L      Chloride 107 mmol/L      CO2 26.0 mmol/L      Calcium 8.5 mg/dL      BUN/Creatinine Ratio 12.5     Anion Gap 7.0 mmol/L      eGFR 107.8 mL/min/1.73     Narrative:      GFR Normal >60  Chronic Kidney Disease <60  Kidney Failure <15      CBC & Differential [[id number]]  (Abnormal) Collected: 02/20/23 0722    Specimen: Blood Updated: 02/20/23 0823    Narrative:      The following orders were created for panel order CBC & Differential.  Procedure                               Abnormality         Status                     ---------                               -----------         ------                     CBC Auto Differential[[id number]]        Abnormal            Final result                 Please view results for these tests on the individual orders.    CBC Auto Differential [[id number]]  (Abnormal) Collected: 02/20/23 0722    Specimen: Blood Updated: 02/20/23 0823     WBC 4.01 10*3/mm3      RBC 4.23 10*6/mm3      Hemoglobin 12.9 g/dL      Hematocrit 39.0 %      MCV 92.2 fL      MCH 30.5 pg      MCHC 33.1 g/dL      RDW 12.6 %      RDW-SD 42.8 fl      MPV 10.2 fL      Platelets 229 10*3/mm3      Neutrophil % 41.4 %      Lymphocyte % 39.4 %      Monocyte % 16.0 %      Eosinophil % 2.5 %      Basophil % 0.5 %      Immature Grans % 0.2 %      Neutrophils,  Absolute 1.66 10*3/mm3      Lymphocytes, Absolute 1.58 10*3/mm3      Monocytes, Absolute 0.64 10*3/mm3      Eosinophils, Absolute 0.10 10*3/mm3      Basophils, Absolute 0.02 10*3/mm3      Immature Grans, Absolute 0.01 10*3/mm3      nRBC 0.0 /100 WBC           I/O last shift:  No intake/output data recorded.       Pathology:  * Cannot find OR log *.    Assessment and Plan:    52-year-old female with uncomplicated diverticulitis, slow to resolve requiring inpatient IV antibiotics.  She is slowly starting to improve.    Plan  If she is able to tolerate a full liquid diet this morning, I am clear with her being discharged  I would hold on her abatacept while she is getting over her acute infection  We will follow-up on cardiology's recommendations as she underwent her stress test yesterday.  She will follow-up with me in 2 weeks for reevaluation with a plan for elective scope and colectomy in the next 2 months    Isidro Stanley MD  [facility] ([facility]  02/21/23  06:58 EST

## 2023-02-22 NOTE — OUTREACH NOTE
Prep Survey    Flowsheet Row Responses   [facility] facility patient discharged from? [place]   Is LACE score < 7 ? No   Eligibility Readm Mgmt   Discharge diagnosis Diverticulitis   Does the patient have one of the following disease processes/diagnoses(primary or secondary)? Other   Does the patient have Home health ordered? No   Is there a DME ordered? No   Medication alerts for this patient returning home and learning to infuse her antibiotics for home infusion   Prep survey completed? Yes          Magi VALENTIN - Registered Nurse

## 2023-02-23 ENCOUNTER — LAB (OUTPATIENT)
Dept: LAB | Facility: HOSPITAL | Age: 53
End: 2023-02-23
Payer: COMMERCIAL

## 2023-02-23 ENCOUNTER — TRANSCRIBE ORDERS (OUTPATIENT)
Dept: LAB | Facility: HOSPITAL | Age: 53
End: 2023-02-23
Payer: COMMERCIAL

## 2023-02-23 DIAGNOSIS — K57.32 DIVERTICULITIS OF SIGMOID COLON: Primary | ICD-10-CM

## 2023-02-23 LAB
ALBUMIN SERPL-MCNC: 4.3 G/DL (ref 3.5–5.2)
ALBUMIN/GLOB SERPL: 1.6 G/DL
ALP SERPL-CCNC: 89 U/L (ref 39–117)
ALT SERPL W P-5'-P-CCNC: 45 U/L (ref 1–33)
ANION GAP SERPL CALCULATED.3IONS-SCNC: 13 MMOL/L (ref 5–15)
AST SERPL-CCNC: 43 U/L (ref 1–32)
BASOPHILS # BLD AUTO: 0.03 10*3/MM3 (ref 0–0.2)
BASOPHILS NFR BLD AUTO: 0.5 % (ref 0–1.5)
BILIRUB SERPL-MCNC: 0.4 MG/DL (ref 0–1.2)
BUN SERPL-MCNC: 12 MG/DL (ref 6–20)
BUN/CREAT SERPL: 11.9 (ref 7–25)
CALCIUM SPEC-SCNC: 9.4 MG/DL (ref 8.6–10.5)
CHLORIDE SERPL-SCNC: 100 MMOL/L (ref 98–107)
CO2 SERPL-SCNC: 25 MMOL/L (ref 22–29)
CREAT SERPL-MCNC: 1.01 MG/DL (ref 0.57–1)
CRP SERPL-MCNC: <0.3 MG/DL (ref 0–0.5)
DEPRECATED RDW RBC AUTO: 41.9 FL (ref 37–54)
EGFRCR SERPLBLD CKD-EPI 2021: 68 ML/MIN/1.73
EOSINOPHIL # BLD AUTO: 0.08 10*3/MM3 (ref 0–0.4)
EOSINOPHIL NFR BLD AUTO: 1.3 % (ref 0.3–6.2)
ERYTHROCYTE [DISTWIDTH] IN BLOOD BY AUTOMATED COUNT: 12.8 % (ref 12.3–15.4)
ERYTHROCYTE [SEDIMENTATION RATE] IN BLOOD: 19 MM/HR (ref 0–30)
GLOBULIN UR ELPH-MCNC: 2.7 GM/DL
GLUCOSE SERPL-MCNC: 80 MG/DL (ref 65–99)
HCT VFR BLD AUTO: 45.1 % (ref 34–46.6)
HGB BLD-MCNC: 15.1 G/DL (ref 12–15.9)
IMM GRANULOCYTES # BLD AUTO: 0.01 10*3/MM3 (ref 0–0.05)
IMM GRANULOCYTES NFR BLD AUTO: 0.2 % (ref 0–0.5)
LYMPHOCYTES # BLD AUTO: 1.72 10*3/MM3 (ref 0.7–3.1)
LYMPHOCYTES NFR BLD AUTO: 28.6 % (ref 19.6–45.3)
MCH RBC QN AUTO: 30 PG (ref 26.6–33)
MCHC RBC AUTO-ENTMCNC: 33.5 G/DL (ref 31.5–35.7)
MCV RBC AUTO: 89.7 FL (ref 79–97)
MONOCYTES # BLD AUTO: 0.58 10*3/MM3 (ref 0.1–0.9)
MONOCYTES NFR BLD AUTO: 9.7 % (ref 5–12)
NEUTROPHILS NFR BLD AUTO: 3.59 10*3/MM3 (ref 1.7–7)
NEUTROPHILS NFR BLD AUTO: 59.7 % (ref 42.7–76)
NRBC BLD AUTO-RTO: 0 /100 WBC (ref 0–0.2)
PLATELET # BLD AUTO: 288 10*3/MM3 (ref 140–450)
PMV BLD AUTO: 10.2 FL (ref 6–12)
POTASSIUM SERPL-SCNC: 3.7 MMOL/L (ref 3.5–5.2)
PROT SERPL-MCNC: 7 G/DL (ref 6–8.5)
RBC # BLD AUTO: 5.03 10*6/MM3 (ref 3.77–5.28)
SODIUM SERPL-SCNC: 138 MMOL/L (ref 136–145)
WBC NRBC COR # BLD: 6.01 10*3/MM3 (ref 3.4–10.8)

## 2023-02-23 PROCEDURE — 85652 RBC SED RATE AUTOMATED: CPT | Performed by: INTERNAL MEDICINE

## 2023-02-23 PROCEDURE — 80053 COMPREHEN METABOLIC PANEL: CPT | Performed by: INTERNAL MEDICINE

## 2023-02-23 PROCEDURE — 85025 COMPLETE CBC W/AUTO DIFF WBC: CPT | Performed by: INTERNAL MEDICINE

## 2023-02-23 PROCEDURE — 36415 COLL VENOUS BLD VENIPUNCTURE: CPT | Performed by: INTERNAL MEDICINE

## 2023-02-23 PROCEDURE — 86140 C-REACTIVE PROTEIN: CPT | Performed by: INTERNAL MEDICINE

## 2023-02-26 LAB
QT INTERVAL: 470 MS
QTC INTERVAL: 496 MS

## 2023-03-01 ENCOUNTER — READMISSION MANAGEMENT (OUTPATIENT)
Dept: CALL CENTER | Facility: HOSPITAL | Age: 53
End: 2023-03-01
Payer: COMMERCIAL

## 2023-03-01 NOTE — OUTREACH NOTE
Medical Week 2 Survey    Flowsheet Row Responses   [facility] [place] patient discharged from? [place]   Does the patient have one of the following disease processes/diagnoses(primary or secondary)? Other   Week 2 attempt successful? Yes   Call start time 1705   Discharge diagnosis Diverticulitis   Call end time 1706   Meds reviewed with patient/caregiver? Yes   Is the patient having any side effects they believe may be caused by any medication additions or changes? No   Does the patient have all medications ordered at discharge? Yes   Is the patient taking all medications as directed (includes completed medication regime)? Yes   Does the patient have a primary care provider?  Yes   Does the patient have an appointment with their PCP within 7 days of discharge? Yes   Has the patient kept scheduled appointments due by today? Yes   Has home health visited the patient within 72 hours of discharge? N/A   Psychosocial issues? No   Did the patient receive a copy of their discharge instructions? Yes   Nursing interventions Reviewed instructions with patient   What is the patient's perception of their health status since discharge? Improving   Is the patient/caregiver able to teach back signs and symptoms related to disease process for when to call PCP? Yes   Is the patient/caregiver able to teach back signs and symptoms related to disease process for when to call 911? Yes   Is the patient/caregiver able to teach back the hierarchy of who to call/visit for symptoms/problems? PCP, Specialist, Home health nurse, Urgent Care, ED, 911 Yes   If the patient is a current smoker, are they able to teach back resources for cessation? Not a smoker   Week 2 Call Completed? Yes          Junie Mathur Registered Nurse

## 2023-03-02 ENCOUNTER — TRANSCRIBE ORDERS (OUTPATIENT)
Dept: LAB | Facility: HOSPITAL | Age: 53
End: 2023-03-02
Payer: COMMERCIAL

## 2023-03-02 ENCOUNTER — LAB (OUTPATIENT)
Dept: LAB | Facility: HOSPITAL | Age: 53
End: 2023-03-02
Payer: COMMERCIAL

## 2023-03-02 DIAGNOSIS — K57.32 DIVERTICULITIS OF SIGMOID COLON: ICD-10-CM

## 2023-03-02 DIAGNOSIS — R74.02 NONSPECIFIC ELEVATION OF LEVELS OF TRANSAMINASE OR LACTIC ACID DEHYDROGENASE (LDH): ICD-10-CM

## 2023-03-02 DIAGNOSIS — K57.32 DIVERTICULITIS OF SIGMOID COLON: Primary | ICD-10-CM

## 2023-03-02 DIAGNOSIS — R74.01 NONSPECIFIC ELEVATION OF LEVELS OF TRANSAMINASE OR LACTIC ACID DEHYDROGENASE (LDH): ICD-10-CM

## 2023-03-02 LAB
ALBUMIN SERPL-MCNC: 4 G/DL (ref 3.5–5.2)
ALBUMIN/GLOB SERPL: 1.7 G/DL
ALP SERPL-CCNC: 98 U/L (ref 39–117)
ALT SERPL W P-5'-P-CCNC: 46 U/L (ref 1–33)
ANION GAP SERPL CALCULATED.3IONS-SCNC: 8 MMOL/L (ref 5–15)
AST SERPL-CCNC: 44 U/L (ref 1–32)
BASOPHILS # BLD AUTO: 0.04 10*3/MM3 (ref 0–0.2)
BASOPHILS NFR BLD AUTO: 0.5 % (ref 0–1.5)
BILIRUB SERPL-MCNC: 0.2 MG/DL (ref 0–1.2)
BUN SERPL-MCNC: 16 MG/DL (ref 6–20)
BUN/CREAT SERPL: 22.9 (ref 7–25)
CALCIUM SPEC-SCNC: 8.6 MG/DL (ref 8.6–10.5)
CHLORIDE SERPL-SCNC: 103 MMOL/L (ref 98–107)
CO2 SERPL-SCNC: 28 MMOL/L (ref 22–29)
CREAT SERPL-MCNC: 0.7 MG/DL (ref 0.57–1)
CRP SERPL-MCNC: 0.39 MG/DL (ref 0–0.5)
DEPRECATED RDW RBC AUTO: 41 FL (ref 37–54)
EGFRCR SERPLBLD CKD-EPI 2021: 105.5 ML/MIN/1.73
EOSINOPHIL # BLD AUTO: 0.14 10*3/MM3 (ref 0–0.4)
EOSINOPHIL NFR BLD AUTO: 1.6 % (ref 0.3–6.2)
ERYTHROCYTE [DISTWIDTH] IN BLOOD BY AUTOMATED COUNT: 12.4 % (ref 12.3–15.4)
ERYTHROCYTE [SEDIMENTATION RATE] IN BLOOD: 15 MM/HR (ref 0–30)
GLOBULIN UR ELPH-MCNC: 2.4 GM/DL
GLUCOSE SERPL-MCNC: 104 MG/DL (ref 65–99)
HCT VFR BLD AUTO: 41.3 % (ref 34–46.6)
HGB BLD-MCNC: 13.7 G/DL (ref 12–15.9)
IMM GRANULOCYTES # BLD AUTO: 0.01 10*3/MM3 (ref 0–0.05)
IMM GRANULOCYTES NFR BLD AUTO: 0.1 % (ref 0–0.5)
LYMPHOCYTES # BLD AUTO: 1.9 10*3/MM3 (ref 0.7–3.1)
LYMPHOCYTES NFR BLD AUTO: 22.1 % (ref 19.6–45.3)
MCH RBC QN AUTO: 30 PG (ref 26.6–33)
MCHC RBC AUTO-ENTMCNC: 33.2 G/DL (ref 31.5–35.7)
MCV RBC AUTO: 90.6 FL (ref 79–97)
MONOCYTES # BLD AUTO: 0.99 10*3/MM3 (ref 0.1–0.9)
MONOCYTES NFR BLD AUTO: 11.5 % (ref 5–12)
NEUTROPHILS NFR BLD AUTO: 5.53 10*3/MM3 (ref 1.7–7)
NEUTROPHILS NFR BLD AUTO: 64.2 % (ref 42.7–76)
NRBC BLD AUTO-RTO: 0 /100 WBC (ref 0–0.2)
PLATELET # BLD AUTO: 249 10*3/MM3 (ref 140–450)
PMV BLD AUTO: 10.4 FL (ref 6–12)
POTASSIUM SERPL-SCNC: 4.1 MMOL/L (ref 3.5–5.2)
PROT SERPL-MCNC: 6.4 G/DL (ref 6–8.5)
RBC # BLD AUTO: 4.56 10*6/MM3 (ref 3.77–5.28)
SODIUM SERPL-SCNC: 139 MMOL/L (ref 136–145)
WBC NRBC COR # BLD: 8.61 10*3/MM3 (ref 3.4–10.8)

## 2023-03-02 PROCEDURE — 86140 C-REACTIVE PROTEIN: CPT

## 2023-03-02 PROCEDURE — 36415 COLL VENOUS BLD VENIPUNCTURE: CPT

## 2023-03-02 PROCEDURE — 80053 COMPREHEN METABOLIC PANEL: CPT

## 2023-03-02 PROCEDURE — 85652 RBC SED RATE AUTOMATED: CPT

## 2023-03-02 PROCEDURE — 85025 COMPLETE CBC W/AUTO DIFF WBC: CPT

## 2023-03-10 ENCOUNTER — READMISSION MANAGEMENT (OUTPATIENT)
Dept: CALL CENTER | Facility: HOSPITAL | Age: 53
End: 2023-03-10
Payer: COMMERCIAL

## 2023-03-10 NOTE — OUTREACH NOTE
Medical Week 3 Survey    Flowsheet Row Responses   [facility] patient discharged from? [facility]   Does the patient have one of the following disease processes/diagnoses(primary or secondary)? Other   Week 3 attempt successful? Yes   Call start time 1204   Call end time 1225   List who call center can speak with pt   Medication alerts for this patient IV antibiotics d/c.   Meds reviewed with patient/caregiver? Yes   Is the patient taking all medications as directed (includes completed medication regime)? Yes   Comments regarding appointments Pt has followed up with pcp. Pt to have surgery in April.   Does the patient have a primary care provider?  Yes   Has the patient kept scheduled appointments due by today? Yes   What is the patient's perception of their health status since discharge? Improving   Week 3 Call Completed? Yes   Wrap up additional comments Pt reports improving. Pt no longer requiring IV antibiotics. Pt reports significant weight gain. Pt does take diuretics. PCP aware. Pt encouraged to contact cardiology.          Nellie VALENTIN - Registered Nurse

## 2023-03-14 ENCOUNTER — PREP FOR SURGERY (OUTPATIENT)
Dept: OTHER | Facility: HOSPITAL | Age: 53
End: 2023-03-14
Payer: COMMERCIAL

## 2023-03-24 ENCOUNTER — TRANSCRIBE ORDERS (OUTPATIENT)
Dept: LAB | Facility: HOSPITAL | Age: 53
End: 2023-03-24
Payer: COMMERCIAL

## 2023-03-24 ENCOUNTER — LAB (OUTPATIENT)
Dept: LAB | Facility: HOSPITAL | Age: 53
End: 2023-03-24
Payer: COMMERCIAL

## 2023-03-24 DIAGNOSIS — R74.02 NONSPECIFIC ELEVATION OF LEVELS OF TRANSAMINASE OR LACTIC ACID DEHYDROGENASE (LDH): ICD-10-CM

## 2023-03-24 DIAGNOSIS — K57.32 DIVERTICULITIS OF LARGE INTESTINE WITHOUT PERFORATION OR ABSCESS, UNSPECIFIED BLEEDING STATUS: Primary | ICD-10-CM

## 2023-03-24 DIAGNOSIS — R74.01 NONSPECIFIC ELEVATION OF LEVELS OF TRANSAMINASE OR LACTIC ACID DEHYDROGENASE (LDH): ICD-10-CM

## 2023-03-24 DIAGNOSIS — I10 ESSENTIAL HYPERTENSION, BENIGN: ICD-10-CM

## 2023-03-24 DIAGNOSIS — K57.32 DIVERTICULITIS OF LARGE INTESTINE WITHOUT PERFORATION OR ABSCESS, UNSPECIFIED BLEEDING STATUS: ICD-10-CM

## 2023-03-24 DIAGNOSIS — I10 ESSENTIAL HYPERTENSION, MALIGNANT: ICD-10-CM

## 2023-03-24 LAB
ALBUMIN SERPL-MCNC: 4.1 G/DL (ref 3.5–5.2)
ALBUMIN/GLOB SERPL: 1.6 G/DL
ALP SERPL-CCNC: 89 U/L (ref 39–117)
ALT SERPL W P-5'-P-CCNC: 65 U/L (ref 1–33)
ANION GAP SERPL CALCULATED.3IONS-SCNC: 10 MMOL/L (ref 5–15)
AST SERPL-CCNC: 71 U/L (ref 1–32)
BASOPHILS # BLD AUTO: 0.04 10*3/MM3 (ref 0–0.2)
BASOPHILS NFR BLD AUTO: 0.6 % (ref 0–1.5)
BILIRUB SERPL-MCNC: 0.3 MG/DL (ref 0–1.2)
BUN SERPL-MCNC: 17 MG/DL (ref 6–20)
BUN/CREAT SERPL: 26.2 (ref 7–25)
C DIFF TOX GENS STL QL NAA+PROBE: NOT DETECTED
CALCIUM SPEC-SCNC: 9.1 MG/DL (ref 8.6–10.5)
CHLORIDE SERPL-SCNC: 106 MMOL/L (ref 98–107)
CK SERPL-CCNC: 75 U/L (ref 20–180)
CO2 SERPL-SCNC: 27 MMOL/L (ref 22–29)
CREAT SERPL-MCNC: 0.65 MG/DL (ref 0.57–1)
CRP SERPL-MCNC: 0.49 MG/DL (ref 0–0.5)
DEPRECATED RDW RBC AUTO: 40.7 FL (ref 37–54)
EGFRCR SERPLBLD CKD-EPI 2021: 107.4 ML/MIN/1.73
EOSINOPHIL # BLD AUTO: 0.11 10*3/MM3 (ref 0–0.4)
EOSINOPHIL NFR BLD AUTO: 1.5 % (ref 0.3–6.2)
ERYTHROCYTE [DISTWIDTH] IN BLOOD BY AUTOMATED COUNT: 12.3 % (ref 12.3–15.4)
ERYTHROCYTE [SEDIMENTATION RATE] IN BLOOD: 19 MM/HR (ref 0–30)
GLOBULIN UR ELPH-MCNC: 2.6 GM/DL
GLUCOSE SERPL-MCNC: 111 MG/DL (ref 65–99)
HCT VFR BLD AUTO: 44.1 % (ref 34–46.6)
HGB BLD-MCNC: 14.6 G/DL (ref 12–15.9)
IMM GRANULOCYTES # BLD AUTO: 0.02 10*3/MM3 (ref 0–0.05)
IMM GRANULOCYTES NFR BLD AUTO: 0.3 % (ref 0–0.5)
LYMPHOCYTES # BLD AUTO: 1.43 10*3/MM3 (ref 0.7–3.1)
LYMPHOCYTES NFR BLD AUTO: 19.8 % (ref 19.6–45.3)
MCH RBC QN AUTO: 29.7 PG (ref 26.6–33)
MCHC RBC AUTO-ENTMCNC: 33.1 G/DL (ref 31.5–35.7)
MCV RBC AUTO: 89.6 FL (ref 79–97)
MONOCYTES # BLD AUTO: 0.87 10*3/MM3 (ref 0.1–0.9)
MONOCYTES NFR BLD AUTO: 12 % (ref 5–12)
NEUTROPHILS NFR BLD AUTO: 4.77 10*3/MM3 (ref 1.7–7)
NEUTROPHILS NFR BLD AUTO: 65.8 % (ref 42.7–76)
NRBC BLD AUTO-RTO: 0 /100 WBC (ref 0–0.2)
PLATELET # BLD AUTO: 250 10*3/MM3 (ref 140–450)
PMV BLD AUTO: 10 FL (ref 6–12)
POTASSIUM SERPL-SCNC: 4.5 MMOL/L (ref 3.5–5.2)
PROT SERPL-MCNC: 6.7 G/DL (ref 6–8.5)
RBC # BLD AUTO: 4.92 10*6/MM3 (ref 3.77–5.28)
SODIUM SERPL-SCNC: 143 MMOL/L (ref 136–145)
WBC NRBC COR # BLD: 7.24 10*3/MM3 (ref 3.4–10.8)

## 2023-03-24 PROCEDURE — 86140 C-REACTIVE PROTEIN: CPT

## 2023-03-24 PROCEDURE — 82550 ASSAY OF CK (CPK): CPT

## 2023-03-24 PROCEDURE — 80053 COMPREHEN METABOLIC PANEL: CPT

## 2023-03-24 PROCEDURE — 85025 COMPLETE CBC W/AUTO DIFF WBC: CPT

## 2023-03-24 PROCEDURE — 36415 COLL VENOUS BLD VENIPUNCTURE: CPT

## 2023-03-24 PROCEDURE — 87493 C DIFF AMPLIFIED PROBE: CPT

## 2023-03-24 PROCEDURE — 85652 RBC SED RATE AUTOMATED: CPT

## 2023-03-29 ENCOUNTER — APPOINTMENT (OUTPATIENT)
Dept: CT IMAGING | Facility: HOSPITAL | Age: 53
DRG: 333 | End: 2023-03-29
Payer: COMMERCIAL

## 2023-03-29 ENCOUNTER — HOSPITAL ENCOUNTER (INPATIENT)
Facility: HOSPITAL | Age: 53
LOS: 5 days | Discharge: HOME | DRG: 333 | End: 2023-04-03
Attending: INTERNAL MEDICINE | Admitting: STUDENT IN AN ORGANIZED HEALTH CARE EDUCATION/TRAINING PROGRAM
Payer: COMMERCIAL

## 2023-03-29 DIAGNOSIS — K57.92 DIVERTICULITIS: ICD-10-CM

## 2023-03-29 DIAGNOSIS — Z90.49 S/P LAPAROSCOPIC COLECTOMY: Primary | ICD-10-CM

## 2023-03-29 PROBLEM — R79.89 ELEVATED SERUM CREATININE: Status: ACTIVE | Noted: 2023-03-29

## 2023-03-29 LAB
ABO GROUP BLD: NORMAL
ABO GROUP BLD: NORMAL
ALBUMIN SERPL-MCNC: 4 G/DL (ref 3.5–5.2)
ALBUMIN/GLOB SERPL: 1.5 G/DL
ALP SERPL-CCNC: 75 U/L (ref 39–117)
ALT SERPL W P-5'-P-CCNC: 43 U/L (ref 1–33)
ANION GAP SERPL CALCULATED.3IONS-SCNC: 9 MMOL/L (ref 5–15)
AST SERPL-CCNC: 49 U/L (ref 1–32)
BASOPHILS # BLD AUTO: 0.02 10*3/MM3 (ref 0–0.2)
BASOPHILS NFR BLD AUTO: 0.3 % (ref 0–1.5)
BILIRUB SERPL-MCNC: 0.3 MG/DL (ref 0–1.2)
BLD GP AB SCN SERPL QL: NEGATIVE
BUN SERPL-MCNC: 19 MG/DL (ref 6–20)
BUN/CREAT SERPL: 16.8 (ref 7–25)
CALCIUM SPEC-SCNC: 8.8 MG/DL (ref 8.6–10.5)
CHLORIDE SERPL-SCNC: 105 MMOL/L (ref 98–107)
CO2 SERPL-SCNC: 25 MMOL/L (ref 22–29)
CREAT SERPL-MCNC: 1.13 MG/DL (ref 0.57–1)
D-LACTATE SERPL-SCNC: 0.8 MMOL/L (ref 0.5–2)
DEPRECATED RDW RBC AUTO: 41.1 FL (ref 37–54)
EGFRCR SERPLBLD CKD-EPI 2021: 59.4 ML/MIN/1.73
EOSINOPHIL # BLD AUTO: 0.1 10*3/MM3 (ref 0–0.4)
EOSINOPHIL NFR BLD AUTO: 1.6 % (ref 0.3–6.2)
ERYTHROCYTE [DISTWIDTH] IN BLOOD BY AUTOMATED COUNT: 12.3 % (ref 12.3–15.4)
GLOBULIN UR ELPH-MCNC: 2.7 GM/DL
GLUCOSE SERPL-MCNC: 108 MG/DL (ref 65–99)
HCT VFR BLD AUTO: 42.1 % (ref 34–46.6)
HGB BLD-MCNC: 13.7 G/DL (ref 12–15.9)
IMM GRANULOCYTES # BLD AUTO: 0.02 10*3/MM3 (ref 0–0.05)
IMM GRANULOCYTES NFR BLD AUTO: 0.3 % (ref 0–0.5)
LYMPHOCYTES # BLD AUTO: 1.3 10*3/MM3 (ref 0.7–3.1)
LYMPHOCYTES NFR BLD AUTO: 20.2 % (ref 19.6–45.3)
MCH RBC QN AUTO: 29.5 PG (ref 26.6–33)
MCHC RBC AUTO-ENTMCNC: 32.5 G/DL (ref 31.5–35.7)
MCV RBC AUTO: 90.7 FL (ref 79–97)
MONOCYTES # BLD AUTO: 0.63 10*3/MM3 (ref 0.1–0.9)
MONOCYTES NFR BLD AUTO: 9.8 % (ref 5–12)
NEUTROPHILS NFR BLD AUTO: 4.37 10*3/MM3 (ref 1.7–7)
NEUTROPHILS NFR BLD AUTO: 67.8 % (ref 42.7–76)
NRBC BLD AUTO-RTO: 0 /100 WBC (ref 0–0.2)
NT-PROBNP SERPL-MCNC: 93.4 PG/ML (ref 0–900)
PLATELET # BLD AUTO: 276 10*3/MM3 (ref 140–450)
PMV BLD AUTO: 10.1 FL (ref 6–12)
POTASSIUM SERPL-SCNC: 4.5 MMOL/L (ref 3.5–5.2)
PROCALCITONIN SERPL-MCNC: 0.18 NG/ML (ref 0–0.25)
PROT SERPL-MCNC: 6.7 G/DL (ref 6–8.5)
RBC # BLD AUTO: 4.64 10*6/MM3 (ref 3.77–5.28)
RH BLD: POSITIVE
RH BLD: POSITIVE
SODIUM SERPL-SCNC: 139 MMOL/L (ref 136–145)
T&S EXPIRATION DATE: NORMAL
WBC NRBC COR # BLD: 6.44 10*3/MM3 (ref 3.4–10.8)

## 2023-03-29 PROCEDURE — 84145 PROCALCITONIN (PCT): CPT | Performed by: INTERNAL MEDICINE

## 2023-03-29 PROCEDURE — 86900 BLOOD TYPING SEROLOGIC ABO: CPT

## 2023-03-29 PROCEDURE — 86901 BLOOD TYPING SEROLOGIC RH(D): CPT

## 2023-03-29 PROCEDURE — 83880 ASSAY OF NATRIURETIC PEPTIDE: CPT | Performed by: INTERNAL MEDICINE

## 2023-03-29 PROCEDURE — 25510000001 IOPAMIDOL 61 % SOLUTION: Performed by: INTERNAL MEDICINE

## 2023-03-29 PROCEDURE — 83605 ASSAY OF LACTIC ACID: CPT | Performed by: INTERNAL MEDICINE

## 2023-03-29 PROCEDURE — 93005 ELECTROCARDIOGRAM TRACING: CPT | Performed by: NURSE PRACTITIONER

## 2023-03-29 PROCEDURE — 86901 BLOOD TYPING SEROLOGIC RH(D): CPT | Performed by: STUDENT IN AN ORGANIZED HEALTH CARE EDUCATION/TRAINING PROGRAM

## 2023-03-29 PROCEDURE — 85025 COMPLETE CBC W/AUTO DIFF WBC: CPT | Performed by: INTERNAL MEDICINE

## 2023-03-29 PROCEDURE — 25010000002 PIPERACILLIN SOD-TAZOBACTAM PER 1 G: Performed by: INTERNAL MEDICINE

## 2023-03-29 PROCEDURE — 74177 CT ABD & PELVIS W/CONTRAST: CPT

## 2023-03-29 PROCEDURE — 25010000002 ONDANSETRON PER 1 MG: Performed by: STUDENT IN AN ORGANIZED HEALTH CARE EDUCATION/TRAINING PROGRAM

## 2023-03-29 PROCEDURE — 86850 RBC ANTIBODY SCREEN: CPT | Performed by: STUDENT IN AN ORGANIZED HEALTH CARE EDUCATION/TRAINING PROGRAM

## 2023-03-29 PROCEDURE — 99223 1ST HOSP IP/OBS HIGH 75: CPT | Performed by: NURSE PRACTITIONER

## 2023-03-29 PROCEDURE — 86900 BLOOD TYPING SEROLOGIC ABO: CPT | Performed by: STUDENT IN AN ORGANIZED HEALTH CARE EDUCATION/TRAINING PROGRAM

## 2023-03-29 PROCEDURE — 80053 COMPREHEN METABOLIC PANEL: CPT | Performed by: INTERNAL MEDICINE

## 2023-03-29 RX ORDER — NEOMYCIN SULFATE 500 MG/1
1000 TABLET ORAL ONCE
Status: COMPLETED | OUTPATIENT
Start: 2023-03-29 | End: 2023-03-29

## 2023-03-29 RX ORDER — SODIUM CHLORIDE 0.9 % (FLUSH) 0.9 %
10 SYRINGE (ML) INJECTION EVERY 12 HOURS SCHEDULED
Status: DISCONTINUED | OUTPATIENT
Start: 2023-03-29 | End: 2023-04-03 | Stop reason: HOSPADM

## 2023-03-29 RX ORDER — TRAMADOL HYDROCHLORIDE 50 MG/1
50 TABLET ORAL EVERY 6 HOURS PRN
Status: DISCONTINUED | OUTPATIENT
Start: 2023-03-29 | End: 2023-03-30

## 2023-03-29 RX ORDER — SODIUM CHLORIDE, SODIUM LACTATE, POTASSIUM CHLORIDE, CALCIUM CHLORIDE 600; 310; 30; 20 MG/100ML; MG/100ML; MG/100ML; MG/100ML
75 INJECTION, SOLUTION INTRAVENOUS CONTINUOUS
Status: DISCONTINUED | OUTPATIENT
Start: 2023-03-29 | End: 2023-03-30

## 2023-03-29 RX ORDER — LOSARTAN POTASSIUM 50 MG/1
100 TABLET ORAL
Status: DISCONTINUED | OUTPATIENT
Start: 2023-03-30 | End: 2023-04-03 | Stop reason: HOSPADM

## 2023-03-29 RX ORDER — ONDANSETRON 2 MG/ML
4 INJECTION INTRAMUSCULAR; INTRAVENOUS EVERY 6 HOURS PRN
Status: DISCONTINUED | OUTPATIENT
Start: 2023-03-29 | End: 2023-03-29 | Stop reason: SDUPTHER

## 2023-03-29 RX ORDER — NEOMYCIN SULFATE 500 MG/1
1000 TABLET ORAL ONCE
Status: COMPLETED | OUTPATIENT
Start: 2023-03-30 | End: 2023-03-30

## 2023-03-29 RX ORDER — SODIUM CHLORIDE 9 MG/ML
40 INJECTION, SOLUTION INTRAVENOUS AS NEEDED
Status: DISCONTINUED | OUTPATIENT
Start: 2023-03-29 | End: 2023-04-03 | Stop reason: HOSPADM

## 2023-03-29 RX ORDER — LANOLIN ALCOHOL/MO/W.PET/CERES
1000 CREAM (GRAM) TOPICAL DAILY
Status: DISCONTINUED | OUTPATIENT
Start: 2023-03-30 | End: 2023-04-03 | Stop reason: HOSPADM

## 2023-03-29 RX ORDER — METRONIDAZOLE 500 MG/1
500 TABLET ORAL ONCE
Status: COMPLETED | OUTPATIENT
Start: 2023-03-29 | End: 2023-03-29

## 2023-03-29 RX ORDER — AMITRIPTYLINE HYDROCHLORIDE 10 MG/1
10 TABLET, FILM COATED ORAL NIGHTLY
Status: DISCONTINUED | OUTPATIENT
Start: 2023-03-29 | End: 2023-04-03 | Stop reason: HOSPADM

## 2023-03-29 RX ORDER — FUROSEMIDE 20 MG/1
20 TABLET ORAL DAILY
COMMUNITY
End: 2023-04-03 | Stop reason: HOSPADM

## 2023-03-29 RX ORDER — FLUOXETINE HYDROCHLORIDE 20 MG/1
40 CAPSULE ORAL DAILY
Status: DISCONTINUED | OUTPATIENT
Start: 2023-03-30 | End: 2023-04-03 | Stop reason: HOSPADM

## 2023-03-29 RX ORDER — HYDROCHLOROTHIAZIDE 25 MG/1
25 TABLET ORAL
Status: DISCONTINUED | OUTPATIENT
Start: 2023-03-30 | End: 2023-04-01

## 2023-03-29 RX ORDER — TIZANIDINE 4 MG/1
4 TABLET ORAL NIGHTLY
Status: DISCONTINUED | OUTPATIENT
Start: 2023-03-29 | End: 2023-04-03 | Stop reason: HOSPADM

## 2023-03-29 RX ORDER — SODIUM CHLORIDE 0.9 % (FLUSH) 0.9 %
10 SYRINGE (ML) INJECTION AS NEEDED
Status: DISCONTINUED | OUTPATIENT
Start: 2023-03-29 | End: 2023-04-03 | Stop reason: HOSPADM

## 2023-03-29 RX ORDER — ONDANSETRON 2 MG/ML
4 INJECTION INTRAMUSCULAR; INTRAVENOUS EVERY 6 HOURS PRN
Status: DISCONTINUED | OUTPATIENT
Start: 2023-03-29 | End: 2023-03-30 | Stop reason: SDUPTHER

## 2023-03-29 RX ORDER — METRONIDAZOLE 500 MG/1
500 TABLET ORAL ONCE
Status: COMPLETED | OUTPATIENT
Start: 2023-03-30 | End: 2023-03-30

## 2023-03-29 RX ORDER — LEVOTHYROXINE SODIUM 0.15 MG/1
150 TABLET ORAL DAILY
Status: DISCONTINUED | OUTPATIENT
Start: 2023-03-30 | End: 2023-04-03 | Stop reason: HOSPADM

## 2023-03-29 RX ADMIN — METRONIDAZOLE 500 MG: 500 TABLET ORAL at 18:43

## 2023-03-29 RX ADMIN — TIZANIDINE 4 MG: 4 TABLET ORAL at 21:18

## 2023-03-29 RX ADMIN — POLYETHYLENE GLYCOL 3350, SODIUM SULFATE ANHYDROUS, SODIUM BICARBONATE, SODIUM CHLORIDE, POTASSIUM CHLORIDE 4000 ML: 236; 22.74; 6.74; 5.86; 2.97 POWDER, FOR SOLUTION ORAL at 21:18

## 2023-03-29 RX ADMIN — SODIUM CHLORIDE, POTASSIUM CHLORIDE, SODIUM LACTATE AND CALCIUM CHLORIDE 75 ML/HR: 600; 310; 30; 20 INJECTION, SOLUTION INTRAVENOUS at 21:19

## 2023-03-29 RX ADMIN — TAZOBACTAM SODIUM AND PIPERACILLIN SODIUM 3.38 G: 375; 3 INJECTION, SOLUTION INTRAVENOUS at 18:34

## 2023-03-29 RX ADMIN — NEOMYCIN SULFATE 1000 MG: 500 TABLET ORAL at 21:18

## 2023-03-29 RX ADMIN — TAZOBACTAM SODIUM AND PIPERACILLIN SODIUM 3.38 G: 375; 3 INJECTION, SOLUTION INTRAVENOUS at 23:20

## 2023-03-29 RX ADMIN — ONDANSETRON 4 MG: 2 INJECTION INTRAMUSCULAR; INTRAVENOUS at 23:24

## 2023-03-29 RX ADMIN — AMITRIPTYLINE HYDROCHLORIDE 10 MG: 10 TABLET, FILM COATED ORAL at 21:18

## 2023-03-29 RX ADMIN — IOPAMIDOL 85 ML: 612 INJECTION, SOLUTION INTRAVENOUS at 20:41

## 2023-03-29 RX ADMIN — METRONIDAZOLE 500 MG: 500 TABLET ORAL at 19:56

## 2023-03-29 RX ADMIN — NEOMYCIN SULFATE 1000 MG: 500 TABLET ORAL at 19:56

## 2023-03-29 RX ADMIN — Medication 10 ML: at 21:19

## 2023-03-29 RX ADMIN — ONDANSETRON 4 MG: 2 INJECTION INTRAMUSCULAR; INTRAVENOUS at 18:33

## 2023-03-29 NOTE — CONSULTS
Heidy Whitman  [id number]  [id number]  1970    Patient Care Team:  Itzel Long MD as PCP - General (Family Medicine)    Consulting Provider  Dr Enrique    Reason for Consult diverticulitis    Subjective     Patient is a 52 y.o. female who is well-known to me, she has a history of rheumatoid arthritis, hypertension, hyperlipidemia as well as a significant past surgical history for endometriosis.  She was recently admitted with her sixth bout of diverticulitis over the last 5 years.  This was uncomplicated in nature but did take her several days in order to have improvement in her abdominal pain.  She was sent out on IV ertapenem managed by Dr. Corcoran of infectious disease.  She has since seen me in clinic, with plans to perform an elective colonoscopy with elective colectomy.  Unfortunately, she had setback in her symptoms.  She complains of significant abdominal pain and frequent diarrhea.  She did recently test positive for norovirus, but this was over 5 days ago.  Symptoms have been persistent for roughly 10 days.  Due to worsening her symptoms, it was decided to admit her to the hospital for some IV antibiotics, reevaluation and potential colectomy during this hospital stay.    Review of Systems   Pertinent items are noted in HPI, all other systems reviewed and negative. Specifically, there is no F/C/N/V/NSAID abuse, recent abx, new/unusual HA or visual disturbances, CP/SOB. Limb swelling, gait disturbance, new rashes or arthritis.       History  Past Medical History:   Diagnosis Date   • Arthritis    • Breast injury     July 2022-right breast hit with car door   • Current use of steroid medication 2/16/2023   • Disease of thyroid gland    • Diverticulitis    • Edema    • Hyperlipidemia    • Hypertension    • Rheumatoid arthritis (HCC)      Past Surgical History:   Procedure Laterality Date   • ABDOMINAL SURGERY     • HYSTERECTOMY      age 30   • OOPHORECTOMY     • THYROID SURGERY    "    Family History   Problem Relation Age of Onset   • COPD Father    • Heart disease Father    • Breast cancer Paternal Grandmother         age unknown   • Colon cancer Paternal Grandfather    • Ovarian cancer Neg Hx      Social History     Tobacco Use   • Smoking status: Never   Vaping Use   • Vaping Use: Never used   Substance Use Topics   • Alcohol use: Not Currently     Comment: occasional use   • Drug use: No     Medications Prior to Admission   Medication Sig Dispense Refill Last Dose   • Alirocumab (Praluent) 75 MG/ML solution auto-injector Inject 75 mg under the skin into the appropriate area as directed Every 14 (Fourteen) Days. Every other Tuesday (due 2/20)      • amitriptyline (ELAVIL) 10 MG tablet Take 10 mg by mouth Every Night.      • Cyanocobalamin (Vitamin B-12) 5000 MCG tablet dispersible Take 5,000 mcg by mouth Daily.      • FLUoxetine (PROzac) 40 MG capsule Take 40 mg by mouth daily.      • levothyroxine (SYNTHROID, LEVOTHROID) 150 MCG tablet Take 150 mcg by mouth Daily. Pt takes 156 mcg(?)      • Loratadine-Pseudoephedrine (CLARITIN-D 24 HOUR PO) Take  by mouth daily.      • losartan-hydrochlorothiazide (HYZAAR) 100-25 MG per tablet Take 1 tablet by mouth daily.      • metoprolol tartrate (LOPRESSOR) 25 MG tablet Take 0.5 tablets by mouth 2 (Two) Times a Day for 30 days. 30 tablet 0    • ondansetron ODT (ZOFRAN-ODT) 4 MG disintegrating tablet Place 1 tablet on the tongue Every 6 (Six) Hours As Needed for Nausea. 20 tablet 0    • potassium chloride (K-DUR,KLOR-CON) 20 MEQ tablet controlled-release ER tablet Take 80 mEq by mouth 2 (Two) Times a Day.      • TiZANidine (ZANAFLEX) 4 MG capsule Take 4 mg by mouth Every Night.        Allergies:  Green dyes, Guaifenesin & derivatives, Lactose intolerance (gi), Gabapentin, and Pregabalin    Objective     Vital Signs  Blood pressure 124/81, pulse 74, temperature 98.5 °F (36.9 °C), temperature source Oral, resp. rate 16, height 167.6 cm (66\"), weight 92.9 " kg (204 lb 12.8 oz), SpO2 98 %.  No intake/output data recorded.    Physical Exam:  General Appearance: Alert and Oriented  Head: normocephalic, atraumatic  Eyes: HELENA  Neck: trachea midline  Lungs: nonlabored respirations  Heart: regular rhythm & normal rate    Rectal:  Deferred    Abdomen: Soft, tender to palpation in the left lower quadrant and suprapubic area, there is some rebound tenderness here.  No overt peritoneal signs.  .    Skin: no bruising or rash  Neurologic: Cranial Nerves grossly intact   Psych: normal      Results Review:   LABS  Results from last 7 days   Lab Units 03/24/23  1027   WBC 10*3/mm3 7.24   HEMOGLOBIN g/dL 14.6   HEMATOCRIT % 44.1   PLATELETS 10*3/mm3 250     Results from last 7 days   Lab Units 03/24/23  1027   SODIUM mmol/L 143   POTASSIUM mmol/L 4.5   CHLORIDE mmol/L 106   CO2 mmol/L 27.0   BUN mg/dL 17   CREATININE mg/dL 0.65   GLUCOSE mg/dL 111*   CALCIUM mg/dL 9.1       IMAGING  Imaging Results (Last 72 Hours)     ** No results found for the last 72 hours. **           I reviewed the patient's new clinical results.    Assessment & Plan       Diverticulitis      This is a 52-year-old female with hypertension, hyperlipidemia and rheumatoid arthritis unfortunately is relapsing from her recent recovery from uncomplicated diverticulitis.  Certainly, situation is complicated by recent diagnosis of norovirus however her symptoms have been going on for much longer than I would expect the norovirus to affect her.  I think she still has smoldering diverticulitis which has not improved with oral antibiotics.  I harbors significant concern that she will not be able to make it to elective surgery.    Plan  -Agree with getting some labs this afternoon and IV antibiotics  -I will also obtain a CT scan of the abdomen pelvis  -I did discuss with the patient and her  her timing as well as operating with acuity versus an elective procedure.  -She states that she is in significant pain and  wishes to proceed with a colectomy.    -I will work on prepping her colon this evening with mechanical and antibiotic bowel prep  -Tentative plans to proceed with a laparoscopic low anterior resection, possible ostomy tomorrow  -N.p.o. at 2 AM as her surgery is not till 2 PM and she needs to consume her bowel prep.  -Dr. Campuzano with urology will assist with perioperative cystoscopy and stents      Certainly if there is any correctable abnormalities seen on her CT scan, we would move to correct these prior to her surgery.          I discussed the patients findings and my recommendations with patient, family, nursing staff and primary care team.     Isidro Stanley MD  03/29/23  17:54 EDT    Time: More than 50% of time spent in counseling and coordination of care:  Total face-to-face/floor time 25 min.  Time spent in counseling 15 min. Counseling included the following topics: Diverticulitis, elective versus urgent/acute surgery     I did discuss the risk and benefits associated with surgery, specifically risk including bleeding, infection, injury to surrounding structures including arteries, veins, nerves, small bowel, colon, liver, spleen, vagina, ureter.  We also discussed risk of anastomotic leak, possible need for diversion as well as heart attack, stroke, DVT, PE and death.  Patient voices understanding wish to proceed with surgery.

## 2023-03-30 ENCOUNTER — ANESTHESIA EVENT (OUTPATIENT)
Dept: PERIOP | Facility: HOSPITAL | Age: 53
DRG: 333 | End: 2023-03-30
Payer: COMMERCIAL

## 2023-03-30 ENCOUNTER — ANESTHESIA (OUTPATIENT)
Dept: PERIOP | Facility: HOSPITAL | Age: 53
DRG: 333 | End: 2023-03-30
Payer: COMMERCIAL

## 2023-03-30 ENCOUNTER — ANESTHESIA EVENT CONVERTED (OUTPATIENT)
Dept: ANESTHESIOLOGY | Facility: HOSPITAL | Age: 53
DRG: 333 | End: 2023-03-30
Payer: COMMERCIAL

## 2023-03-30 LAB
ANION GAP SERPL CALCULATED.3IONS-SCNC: 11 MMOL/L (ref 5–15)
BASOPHILS # BLD AUTO: 0.02 10*3/MM3 (ref 0–0.2)
BASOPHILS NFR BLD AUTO: 0.4 % (ref 0–1.5)
BUN SERPL-MCNC: 14 MG/DL (ref 6–20)
BUN/CREAT SERPL: 17.7 (ref 7–25)
CALCIUM SPEC-SCNC: 8.8 MG/DL (ref 8.6–10.5)
CHLORIDE SERPL-SCNC: 103 MMOL/L (ref 98–107)
CO2 SERPL-SCNC: 25 MMOL/L (ref 22–29)
CREAT SERPL-MCNC: 0.79 MG/DL (ref 0.57–1)
DEPRECATED RDW RBC AUTO: 41.3 FL (ref 37–54)
EGFRCR SERPLBLD CKD-EPI 2021: 91.3 ML/MIN/1.73
EOSINOPHIL # BLD AUTO: 0.12 10*3/MM3 (ref 0–0.4)
EOSINOPHIL NFR BLD AUTO: 2.6 % (ref 0.3–6.2)
ERYTHROCYTE [DISTWIDTH] IN BLOOD BY AUTOMATED COUNT: 12.4 % (ref 12.3–15.4)
GLUCOSE SERPL-MCNC: 98 MG/DL (ref 65–99)
HCT VFR BLD AUTO: 38.8 % (ref 34–46.6)
HGB BLD-MCNC: 12.6 G/DL (ref 12–15.9)
IMM GRANULOCYTES # BLD AUTO: 0.01 10*3/MM3 (ref 0–0.05)
IMM GRANULOCYTES NFR BLD AUTO: 0.2 % (ref 0–0.5)
INR PPP: 1.01 (ref 0.84–1.13)
LYMPHOCYTES # BLD AUTO: 1.15 10*3/MM3 (ref 0.7–3.1)
LYMPHOCYTES NFR BLD AUTO: 24.5 % (ref 19.6–45.3)
MCH RBC QN AUTO: 29.8 PG (ref 26.6–33)
MCHC RBC AUTO-ENTMCNC: 32.5 G/DL (ref 31.5–35.7)
MCV RBC AUTO: 91.7 FL (ref 79–97)
MONOCYTES # BLD AUTO: 0.63 10*3/MM3 (ref 0.1–0.9)
MONOCYTES NFR BLD AUTO: 13.4 % (ref 5–12)
NEUTROPHILS NFR BLD AUTO: 2.77 10*3/MM3 (ref 1.7–7)
NEUTROPHILS NFR BLD AUTO: 58.9 % (ref 42.7–76)
NRBC BLD AUTO-RTO: 0 /100 WBC (ref 0–0.2)
PLATELET # BLD AUTO: 230 10*3/MM3 (ref 140–450)
PMV BLD AUTO: 10.4 FL (ref 6–12)
POTASSIUM SERPL-SCNC: 3.6 MMOL/L (ref 3.5–5.2)
PROTHROMBIN TIME: 13.2 SECONDS (ref 11.4–14.4)
QT INTERVAL: 418 MS
QTC INTERVAL: 460 MS
RBC # BLD AUTO: 4.23 10*6/MM3 (ref 3.77–5.28)
SODIUM SERPL-SCNC: 139 MMOL/L (ref 136–145)
WBC NRBC COR # BLD: 4.7 10*3/MM3 (ref 3.4–10.8)

## 2023-03-30 PROCEDURE — 52332 CYSTOSCOPY AND TREATMENT: CPT | Performed by: UROLOGY

## 2023-03-30 PROCEDURE — 80048 BASIC METABOLIC PNL TOTAL CA: CPT | Performed by: NURSE PRACTITIONER

## 2023-03-30 PROCEDURE — 0T9B80Z DRAINAGE OF BLADDER WITH DRAINAGE DEVICE, VIA NATURAL OR ARTIFICIAL OPENING ENDOSCOPIC: ICD-10-PCS | Performed by: UROLOGY

## 2023-03-30 PROCEDURE — 88307 TISSUE EXAM BY PATHOLOGIST: CPT | Performed by: STUDENT IN AN ORGANIZED HEALTH CARE EDUCATION/TRAINING PROGRAM

## 2023-03-30 PROCEDURE — 25010000002 FENTANYL CITRATE (PF) 50 MCG/ML SOLUTION

## 2023-03-30 PROCEDURE — 25010000002 ONDANSETRON PER 1 MG: Performed by: STUDENT IN AN ORGANIZED HEALTH CARE EDUCATION/TRAINING PROGRAM

## 2023-03-30 PROCEDURE — 25010000002 PROPOFOL 10 MG/ML EMULSION: Performed by: NURSE ANESTHETIST, CERTIFIED REGISTERED

## 2023-03-30 PROCEDURE — 93010 ELECTROCARDIOGRAM REPORT: CPT | Performed by: INTERNAL MEDICINE

## 2023-03-30 PROCEDURE — 25010000002 FENTANYL CITRATE (PF) 100 MCG/2ML SOLUTION: Performed by: NURSE ANESTHETIST, CERTIFIED REGISTERED

## 2023-03-30 PROCEDURE — C1758 CATHETER, URETERAL: HCPCS | Performed by: STUDENT IN AN ORGANIZED HEALTH CARE EDUCATION/TRAINING PROGRAM

## 2023-03-30 PROCEDURE — 25010000002 HYDROMORPHONE 1 MG/ML SOLUTION: Performed by: NURSE ANESTHETIST, CERTIFIED REGISTERED

## 2023-03-30 PROCEDURE — 25010000002 ONDANSETRON PER 1 MG: Performed by: ANESTHESIOLOGY

## 2023-03-30 PROCEDURE — 85610 PROTHROMBIN TIME: CPT | Performed by: NURSE PRACTITIONER

## 2023-03-30 PROCEDURE — 0DJD8ZZ INSPECTION OF LOWER INTESTINAL TRACT, VIA NATURAL OR ARTIFICIAL OPENING ENDOSCOPIC: ICD-10-PCS | Performed by: STUDENT IN AN ORGANIZED HEALTH CARE EDUCATION/TRAINING PROGRAM

## 2023-03-30 PROCEDURE — 85025 COMPLETE CBC W/AUTO DIFF WBC: CPT | Performed by: NURSE PRACTITIONER

## 2023-03-30 PROCEDURE — 25010000002 KETOROLAC TROMETHAMINE PER 15 MG

## 2023-03-30 PROCEDURE — 25010000002 ONDANSETRON PER 1 MG

## 2023-03-30 PROCEDURE — 0T788DZ DILATION OF BILATERAL URETERS WITH INTRALUMINAL DEVICE, VIA NATURAL OR ARTIFICIAL OPENING ENDOSCOPIC: ICD-10-PCS | Performed by: UROLOGY

## 2023-03-30 PROCEDURE — 0DTP4ZZ RESECTION OF RECTUM, PERCUTANEOUS ENDOSCOPIC APPROACH: ICD-10-PCS | Performed by: STUDENT IN AN ORGANIZED HEALTH CARE EDUCATION/TRAINING PROGRAM

## 2023-03-30 PROCEDURE — 25010000002 PIPERACILLIN SOD-TAZOBACTAM PER 1 G: Performed by: INTERNAL MEDICINE

## 2023-03-30 PROCEDURE — C1769 GUIDE WIRE: HCPCS | Performed by: STUDENT IN AN ORGANIZED HEALTH CARE EDUCATION/TRAINING PROGRAM

## 2023-03-30 PROCEDURE — 99232 SBSQ HOSP IP/OBS MODERATE 35: CPT | Performed by: INTERNAL MEDICINE

## 2023-03-30 PROCEDURE — 25010000002 DEXAMETHASONE SODIUM PHOSPHATE 10 MG/ML SOLUTION: Performed by: NURSE ANESTHETIST, CERTIFIED REGISTERED

## 2023-03-30 PROCEDURE — 25010000002 HYDROMORPHONE 1 MG/ML SOLUTION

## 2023-03-30 PROCEDURE — 25010000002 DEXAMETHASONE PER 1 MG: Performed by: NURSE ANESTHETIST, CERTIFIED REGISTERED

## 2023-03-30 DEVICE — ECHELON CIRCULAR POWERED STAPLER
Type: IMPLANTABLE DEVICE | Site: COLON | Status: FUNCTIONAL
Brand: ECHELON CIRCULAR

## 2023-03-30 DEVICE — ENDOPATH ECHELON ENDOSCOPIC LINEAR CUTTER RELOADS, WHITE, 60MM
Type: IMPLANTABLE DEVICE | Site: COLON | Status: FUNCTIONAL
Brand: ECHELON ENDOPATH

## 2023-03-30 DEVICE — ENDOPATH ECHELON ENDOSCOPIC LINEAR CUTTER RELOADS, BLUE, 60MM
Type: IMPLANTABLE DEVICE | Site: COLON | Status: FUNCTIONAL
Brand: ECHELON ENDOPATH

## 2023-03-30 RX ORDER — FENTANYL CITRATE 50 UG/ML
INJECTION, SOLUTION INTRAMUSCULAR; INTRAVENOUS AS NEEDED
Status: DISCONTINUED | OUTPATIENT
Start: 2023-03-30 | End: 2023-03-30 | Stop reason: SURG

## 2023-03-30 RX ORDER — OXYCODONE HYDROCHLORIDE 5 MG/1
5 TABLET ORAL EVERY 4 HOURS PRN
Status: DISCONTINUED | OUTPATIENT
Start: 2023-03-30 | End: 2023-03-30

## 2023-03-30 RX ORDER — MIDAZOLAM HYDROCHLORIDE 1 MG/ML
1 INJECTION INTRAMUSCULAR; INTRAVENOUS
Status: DISCONTINUED | OUTPATIENT
Start: 2023-03-30 | End: 2023-03-30 | Stop reason: HOSPADM

## 2023-03-30 RX ORDER — MORPHINE SULFATE 4 MG/ML
4 INJECTION, SOLUTION INTRAMUSCULAR; INTRAVENOUS EVERY 4 HOURS PRN
Status: DISCONTINUED | OUTPATIENT
Start: 2023-03-30 | End: 2023-03-30

## 2023-03-30 RX ORDER — FAMOTIDINE 20 MG/1
20 TABLET, FILM COATED ORAL
Status: COMPLETED | OUTPATIENT
Start: 2023-03-30 | End: 2023-03-30

## 2023-03-30 RX ORDER — DEXAMETHASONE SODIUM PHOSPHATE 10 MG/ML
INJECTION, SOLUTION INTRAMUSCULAR; INTRAVENOUS
Status: COMPLETED | OUTPATIENT
Start: 2023-03-30 | End: 2023-03-30

## 2023-03-30 RX ORDER — LIDOCAINE HYDROCHLORIDE 10 MG/ML
INJECTION, SOLUTION EPIDURAL; INFILTRATION; INTRACAUDAL; PERINEURAL AS NEEDED
Status: DISCONTINUED | OUTPATIENT
Start: 2023-03-30 | End: 2023-03-30 | Stop reason: SURG

## 2023-03-30 RX ORDER — EPHEDRINE SULFATE 50 MG/ML
INJECTION INTRAVENOUS AS NEEDED
Status: DISCONTINUED | OUTPATIENT
Start: 2023-03-30 | End: 2023-03-30 | Stop reason: SURG

## 2023-03-30 RX ORDER — SODIUM CHLORIDE 0.9 % (FLUSH) 0.9 %
10 SYRINGE (ML) INJECTION AS NEEDED
Status: DISCONTINUED | OUTPATIENT
Start: 2023-03-30 | End: 2023-03-30 | Stop reason: HOSPADM

## 2023-03-30 RX ORDER — BUPIVACAINE HCL/0.9 % NACL/PF 0.125 %
PLASTIC BAG, INJECTION (ML) EPIDURAL AS NEEDED
Status: DISCONTINUED | OUTPATIENT
Start: 2023-03-30 | End: 2023-03-30 | Stop reason: SURG

## 2023-03-30 RX ORDER — KETOROLAC TROMETHAMINE 30 MG/ML
30 INJECTION, SOLUTION INTRAMUSCULAR; INTRAVENOUS ONCE
Status: COMPLETED | OUTPATIENT
Start: 2023-03-30 | End: 2023-03-30

## 2023-03-30 RX ORDER — PROPOFOL 10 MG/ML
VIAL (ML) INTRAVENOUS AS NEEDED
Status: DISCONTINUED | OUTPATIENT
Start: 2023-03-30 | End: 2023-03-30 | Stop reason: SURG

## 2023-03-30 RX ORDER — LIDOCAINE HYDROCHLORIDE 10 MG/ML
0.5 INJECTION, SOLUTION EPIDURAL; INFILTRATION; INTRACAUDAL; PERINEURAL ONCE AS NEEDED
Status: DISCONTINUED | OUTPATIENT
Start: 2023-03-30 | End: 2023-03-30 | Stop reason: HOSPADM

## 2023-03-30 RX ORDER — ROCURONIUM BROMIDE 10 MG/ML
INJECTION, SOLUTION INTRAVENOUS AS NEEDED
Status: DISCONTINUED | OUTPATIENT
Start: 2023-03-30 | End: 2023-03-30 | Stop reason: SURG

## 2023-03-30 RX ORDER — DEXTROSE, SODIUM CHLORIDE, AND POTASSIUM CHLORIDE 5; .45; .15 G/100ML; G/100ML; G/100ML
75 INJECTION INTRAVENOUS CONTINUOUS
Status: DISCONTINUED | OUTPATIENT
Start: 2023-03-30 | End: 2023-04-01

## 2023-03-30 RX ORDER — ONDANSETRON 2 MG/ML
INJECTION INTRAMUSCULAR; INTRAVENOUS AS NEEDED
Status: DISCONTINUED | OUTPATIENT
Start: 2023-03-30 | End: 2023-03-30 | Stop reason: SURG

## 2023-03-30 RX ORDER — MAGNESIUM HYDROXIDE 1200 MG/15ML
LIQUID ORAL AS NEEDED
Status: DISCONTINUED | OUTPATIENT
Start: 2023-03-30 | End: 2023-03-30 | Stop reason: HOSPADM

## 2023-03-30 RX ORDER — TRAMADOL HYDROCHLORIDE 50 MG/1
50 TABLET ORAL EVERY 6 HOURS PRN
Status: DISCONTINUED | OUTPATIENT
Start: 2023-03-30 | End: 2023-03-31

## 2023-03-30 RX ORDER — SODIUM CHLORIDE 9 MG/ML
40 INJECTION, SOLUTION INTRAVENOUS AS NEEDED
Status: DISCONTINUED | OUTPATIENT
Start: 2023-03-30 | End: 2023-03-30 | Stop reason: HOSPADM

## 2023-03-30 RX ORDER — BUPIVACAINE HYDROCHLORIDE 2.5 MG/ML
INJECTION, SOLUTION EPIDURAL; INFILTRATION; INTRACAUDAL
Status: COMPLETED | OUTPATIENT
Start: 2023-03-30 | End: 2023-03-30

## 2023-03-30 RX ORDER — DIAZEPAM 5 MG/1
5 TABLET ORAL EVERY 6 HOURS PRN
Status: DISCONTINUED | OUTPATIENT
Start: 2023-03-30 | End: 2023-04-03 | Stop reason: HOSPADM

## 2023-03-30 RX ORDER — FENTANYL CITRATE 50 UG/ML
50 INJECTION, SOLUTION INTRAMUSCULAR; INTRAVENOUS
Status: DISCONTINUED | OUTPATIENT
Start: 2023-03-30 | End: 2023-03-30

## 2023-03-30 RX ORDER — ENOXAPARIN SODIUM 100 MG/ML
40 INJECTION SUBCUTANEOUS DAILY
Status: DISCONTINUED | OUTPATIENT
Start: 2023-03-31 | End: 2023-04-02

## 2023-03-30 RX ORDER — NALOXONE HCL 0.4 MG/ML
0.4 VIAL (ML) INJECTION
Status: DISCONTINUED | OUTPATIENT
Start: 2023-03-30 | End: 2023-04-03 | Stop reason: HOSPADM

## 2023-03-30 RX ORDER — ONDANSETRON 2 MG/ML
4 INJECTION INTRAMUSCULAR; INTRAVENOUS ONCE
Status: COMPLETED | OUTPATIENT
Start: 2023-03-30 | End: 2023-03-30

## 2023-03-30 RX ORDER — DEXAMETHASONE SODIUM PHOSPHATE 4 MG/ML
INJECTION, SOLUTION INTRA-ARTICULAR; INTRALESIONAL; INTRAMUSCULAR; INTRAVENOUS; SOFT TISSUE AS NEEDED
Status: DISCONTINUED | OUTPATIENT
Start: 2023-03-30 | End: 2023-03-30 | Stop reason: SURG

## 2023-03-30 RX ORDER — ONDANSETRON 2 MG/ML
INJECTION INTRAMUSCULAR; INTRAVENOUS
Status: COMPLETED
Start: 2023-03-30 | End: 2023-03-30

## 2023-03-30 RX ORDER — ACETAMINOPHEN 500 MG
1000 TABLET ORAL EVERY 6 HOURS
Status: DISCONTINUED | OUTPATIENT
Start: 2023-03-30 | End: 2023-04-03

## 2023-03-30 RX ORDER — SODIUM CHLORIDE 0.9 % (FLUSH) 0.9 %
10 SYRINGE (ML) INJECTION EVERY 12 HOURS SCHEDULED
Status: DISCONTINUED | OUTPATIENT
Start: 2023-03-30 | End: 2023-03-30 | Stop reason: HOSPADM

## 2023-03-30 RX ORDER — FENTANYL CITRATE 50 UG/ML
INJECTION, SOLUTION INTRAMUSCULAR; INTRAVENOUS
Status: COMPLETED
Start: 2023-03-30 | End: 2023-03-30

## 2023-03-30 RX ORDER — ONDANSETRON 2 MG/ML
4 INJECTION INTRAMUSCULAR; INTRAVENOUS EVERY 6 HOURS PRN
Status: DISCONTINUED | OUTPATIENT
Start: 2023-03-30 | End: 2023-04-03 | Stop reason: HOSPADM

## 2023-03-30 RX ORDER — SODIUM CHLORIDE, SODIUM LACTATE, POTASSIUM CHLORIDE, CALCIUM CHLORIDE 600; 310; 30; 20 MG/100ML; MG/100ML; MG/100ML; MG/100ML
9 INJECTION, SOLUTION INTRAVENOUS CONTINUOUS PRN
Status: DISCONTINUED | OUTPATIENT
Start: 2023-03-30 | End: 2023-03-30

## 2023-03-30 RX ORDER — GLYCOPYRROLATE 0.2 MG/ML
INJECTION INTRAMUSCULAR; INTRAVENOUS AS NEEDED
Status: DISCONTINUED | OUTPATIENT
Start: 2023-03-30 | End: 2023-03-30 | Stop reason: SURG

## 2023-03-30 RX ADMIN — Medication 10 ML: at 20:57

## 2023-03-30 RX ADMIN — LEVOTHYROXINE SODIUM 150 MCG: 150 TABLET ORAL at 08:05

## 2023-03-30 RX ADMIN — LIDOCAINE HYDROCHLORIDE 50 MG: 10 INJECTION, SOLUTION EPIDURAL; INFILTRATION; INTRACAUDAL; PERINEURAL at 13:52

## 2023-03-30 RX ADMIN — GLYCOPYRROLATE 0.2 MG: 0.2 INJECTION INTRAMUSCULAR; INTRAVENOUS at 13:55

## 2023-03-30 RX ADMIN — DIAZEPAM 5 MG: 5 TABLET ORAL at 20:37

## 2023-03-30 RX ADMIN — LOSARTAN POTASSIUM 100 MG: 50 TABLET, FILM COATED ORAL at 08:06

## 2023-03-30 RX ADMIN — POTASSIUM CHLORIDE, DEXTROSE MONOHYDRATE AND SODIUM CHLORIDE 75 ML/HR: 150; 5; 450 INJECTION, SOLUTION INTRAVENOUS at 19:02

## 2023-03-30 RX ADMIN — ROCURONIUM BROMIDE 20 MG: 10 INJECTION, SOLUTION INTRAVENOUS at 14:57

## 2023-03-30 RX ADMIN — ONDANSETRON 4 MG: 2 INJECTION INTRAMUSCULAR; INTRAVENOUS at 20:37

## 2023-03-30 RX ADMIN — FENTANYL CITRATE 50 MCG: 50 INJECTION, SOLUTION INTRAMUSCULAR; INTRAVENOUS at 16:28

## 2023-03-30 RX ADMIN — TAZOBACTAM SODIUM AND PIPERACILLIN SODIUM 3.38 G: 375; 3 INJECTION, SOLUTION INTRAVENOUS at 08:05

## 2023-03-30 RX ADMIN — HYDROMORPHONE HYDROCHLORIDE 0.5 MG: 1 INJECTION, SOLUTION INTRAMUSCULAR; INTRAVENOUS; SUBCUTANEOUS at 17:09

## 2023-03-30 RX ADMIN — TIZANIDINE 4 MG: 4 TABLET ORAL at 20:37

## 2023-03-30 RX ADMIN — FENTANYL CITRATE 50 MCG: 50 INJECTION, SOLUTION INTRAMUSCULAR; INTRAVENOUS at 17:56

## 2023-03-30 RX ADMIN — PROPOFOL 200 MG: 10 INJECTION, EMULSION INTRAVENOUS at 13:52

## 2023-03-30 RX ADMIN — SODIUM CHLORIDE, POTASSIUM CHLORIDE, SODIUM LACTATE AND CALCIUM CHLORIDE: 600; 310; 30; 20 INJECTION, SOLUTION INTRAVENOUS at 15:43

## 2023-03-30 RX ADMIN — SODIUM CHLORIDE, POTASSIUM CHLORIDE, SODIUM LACTATE AND CALCIUM CHLORIDE: 600; 310; 30; 20 INJECTION, SOLUTION INTRAVENOUS at 13:48

## 2023-03-30 RX ADMIN — TAZOBACTAM SODIUM AND PIPERACILLIN SODIUM 3.38 G: 375; 3 INJECTION, SOLUTION INTRAVENOUS at 14:12

## 2023-03-30 RX ADMIN — FENTANYL CITRATE 50 MCG: 50 INJECTION, SOLUTION INTRAMUSCULAR; INTRAVENOUS at 16:23

## 2023-03-30 RX ADMIN — HYDROCHLOROTHIAZIDE 25 MG: 25 TABLET ORAL at 08:05

## 2023-03-30 RX ADMIN — FAMOTIDINE 20 MG: 20 TABLET ORAL at 08:05

## 2023-03-30 RX ADMIN — AMITRIPTYLINE HYDROCHLORIDE 10 MG: 10 TABLET, FILM COATED ORAL at 21:22

## 2023-03-30 RX ADMIN — HYDROMORPHONE HYDROCHLORIDE 0.5 MG: 1 INJECTION, SOLUTION INTRAMUSCULAR; INTRAVENOUS; SUBCUTANEOUS at 16:51

## 2023-03-30 RX ADMIN — BUPIVACAINE HYDROCHLORIDE 60 ML: 2.5 INJECTION, SOLUTION EPIDURAL; INFILTRATION; INTRACAUDAL; PERINEURAL at 14:05

## 2023-03-30 RX ADMIN — KETOROLAC TROMETHAMINE 30 MG: 30 INJECTION, SOLUTION INTRAMUSCULAR; INTRAVENOUS at 20:53

## 2023-03-30 RX ADMIN — Medication 10 ML: at 08:06

## 2023-03-30 RX ADMIN — HYDROMORPHONE HYDROCHLORIDE 0.5 MG: 1 INJECTION, SOLUTION INTRAMUSCULAR; INTRAVENOUS; SUBCUTANEOUS at 18:27

## 2023-03-30 RX ADMIN — Medication 100 MCG: at 14:02

## 2023-03-30 RX ADMIN — TRAMADOL HYDROCHLORIDE 50 MG: 50 TABLET, FILM COATED ORAL at 03:57

## 2023-03-30 RX ADMIN — TRAMADOL HYDROCHLORIDE 50 MG: 50 TABLET, FILM COATED ORAL at 20:59

## 2023-03-30 RX ADMIN — ONDANSETRON 4 MG: 2 INJECTION INTRAMUSCULAR; INTRAVENOUS at 16:14

## 2023-03-30 RX ADMIN — FENTANYL CITRATE 100 MCG: 50 INJECTION, SOLUTION INTRAMUSCULAR; INTRAVENOUS at 13:52

## 2023-03-30 RX ADMIN — ACETAMINOPHEN 1000 MG: 500 TABLET ORAL at 20:37

## 2023-03-30 RX ADMIN — METOPROLOL TARTRATE 25 MG: 25 TABLET, FILM COATED ORAL at 08:05

## 2023-03-30 RX ADMIN — ONDANSETRON 4 MG: 2 INJECTION INTRAMUSCULAR; INTRAVENOUS at 17:01

## 2023-03-30 RX ADMIN — DEXAMETHASONE SODIUM PHOSPHATE 8 MG: 4 INJECTION, SOLUTION INTRAMUSCULAR; INTRAVENOUS at 13:56

## 2023-03-30 RX ADMIN — METRONIDAZOLE 500 MG: 500 TABLET ORAL at 01:09

## 2023-03-30 RX ADMIN — SUGAMMADEX 200 MG: 100 INJECTION, SOLUTION INTRAVENOUS at 16:14

## 2023-03-30 RX ADMIN — Medication 100 MCG: at 16:04

## 2023-03-30 RX ADMIN — EPHEDRINE SULFATE 15 MG: 50 INJECTION INTRAVENOUS at 13:57

## 2023-03-30 RX ADMIN — DEXAMETHASONE SODIUM PHOSPHATE 2 MG: 10 INJECTION, SOLUTION INTRAMUSCULAR; INTRAVENOUS at 14:05

## 2023-03-30 RX ADMIN — Medication 200 MCG: at 14:48

## 2023-03-30 RX ADMIN — ROCURONIUM BROMIDE 50 MG: 10 INJECTION, SOLUTION INTRAVENOUS at 13:52

## 2023-03-30 RX ADMIN — CYANOCOBALAMIN TAB 1000 MCG 1000 MCG: 1000 TAB at 08:05

## 2023-03-30 RX ADMIN — FLUOXETINE 40 MG: 20 CAPSULE ORAL at 08:05

## 2023-03-30 RX ADMIN — NEOMYCIN SULFATE 1000 MG: 500 TABLET ORAL at 01:09

## 2023-03-30 NOTE — NURSING NOTE
Pt just got to the floor from surgery, pain is a 8/10, coelho has bright red blood, vitals taken, drinking water.

## 2023-03-30 NOTE — PLAN OF CARE
Problem: Adult Inpatient Plan of Care  Goal: Plan of Care Review  Outcome: Ongoing, Progressing  Flowsheets (Taken 3/30/2023 0231)  Plan of Care Reviewed With: patient  Goal: Patient-Specific Goal (Individualized)  Outcome: Ongoing, Progressing  Goal: Absence of Hospital-Acquired Illness or Injury  Outcome: Ongoing, Progressing  Intervention: Identify and Manage Fall Risk  Description: Perform standard risk assessment on admission using a validated tool or comprehensive approach appropriate to the patient; reassess fall risk frequently, with change in status or transfer to another level of care.  Communicate fall injury risk to interprofessional healthcare team.  Determine need for increased observation, equipment and environmental modification, such as low bed, signage and supportive, nonskid footwear.  Adjust safety measures to individual developmental age, stage and identified risk factors.  Reinforce the importance of safety and physical activity with patient and family.  Perform regular intentional rounding to assess need for position change, pain assessment and personal needs, including assistance with toileting.  Recent Flowsheet Documentation  Taken 3/30/2023 0200 by Gogo Baig, RN  Safety Promotion/Fall Prevention:   safety round/check completed   room organization consistent  Taken 3/30/2023 0000 by Gogo Baig, RN  Safety Promotion/Fall Prevention:   room organization consistent   safety round/check completed  Taken 3/29/2023 2200 by Gogo Baig, RN  Safety Promotion/Fall Prevention:   room organization consistent   safety round/check completed  Intervention: Prevent Skin Injury  Description: Perform a screening for skin injury risk, such as pressure or moisture associated skin damage on admission and at regular intervals throughout hospital stay.  Keep all areas of skin (especially folds) clean and dry.  Maintain adequate skin hydration.  Relieve and redistribute pressure and protect bony  prominences; implement measures based on patient-specific risk factors.  Match turning and repositioning schedule to clinical condition.  Encourage weight shift frequently; assist with reposition if unable to complete independently.  Float heels off bed; avoid pressure on the Achilles tendon.  Keep skin free from extended contact with medical devices.  Encourage functional activity and mobility, as early as tolerated.  Use aids (e.g., slide boards, mechanical lift) during transfer.  Recent Flowsheet Documentation  Taken 3/29/2023 2200 by Gogo Baig RN  Body Position: position changed independently  Taken 3/29/2023 2100 by Gogo Baig RN  Body Position: position changed independently  Skin Protection: adhesive use limited  Intervention: Prevent and Manage VTE (Venous Thromboembolism) Risk  Description: Assess for VTE (venous thromboembolism) risk.  Encourage and assist with early ambulation.  Initiate and maintain compression or other therapy, as indicated, based on identified risk in accordance with organizational protocol and provider order.  Encourage both active and passive leg exercises while in bed, if unable to ambulate.  Recent Flowsheet Documentation  Taken 3/30/2023 0200 by Gogo Baig RN  Activity Management: activity adjusted per tolerance  Taken 3/30/2023 0000 by Gogo Baig RN  Activity Management: activity adjusted per tolerance  Taken 3/29/2023 2200 by Gogo Baig RN  Activity Management: activity adjusted per tolerance  Taken 3/29/2023 2100 by Gogo Baig RN  Range of Motion: active ROM (range of motion) encouraged  Intervention: Prevent Infection  Description: Maintain skin and mucous membrane integrity; promote hand, oral and pulmonary hygiene.  Optimize fluid balance, nutrition, sleep and glycemic control to maximize infection resistance.  Identify potential sources of infection early to prevent or mitigate progression of infection (e.g., wound, lines,  devices).  Evaluate ongoing need for invasive devices; remove promptly when no longer indicated.  Recent Flowsheet Documentation  Taken 3/30/2023 0200 by Gogo Baig RN  Infection Prevention: environmental surveillance performed  Taken 3/30/2023 0000 by Gogo Baig RN  Infection Prevention: environmental surveillance performed  Taken 3/29/2023 2200 by Gogo Baig RN  Infection Prevention: environmental surveillance performed  Taken 3/29/2023 2100 by Gogo Baig RN  Infection Prevention:   environmental surveillance performed   equipment surfaces disinfected   hand hygiene promoted   personal protective equipment utilized   rest/sleep promoted   single patient room provided   visitors restricted/screened  Goal: Optimal Comfort and Wellbeing  Outcome: Ongoing, Progressing  Intervention: Provide Person-Centered Care  Description: Use a family-focused approach to care.  Develop trust and rapport by proactively providing information, encouraging questions, addressing concerns and offering reassurance.  Acknowledge emotional response to hospitalization.  Recognize and utilize personal coping strategies.  Honor spiritual and cultural preferences.  Recent Flowsheet Documentation  Taken 3/29/2023 2100 by Gogo Baig RN  Trust Relationship/Rapport:   care explained   choices provided   emotional support provided   questions answered   questions encouraged   empathic listening provided   reassurance provided   thoughts/feelings acknowledged  Goal: Readiness for Transition of Care  Outcome: Ongoing, Progressing     Problem: Pain Acute  Goal: Acceptable Pain Control and Functional Ability  Outcome: Ongoing, Progressing  Intervention: Prevent or Manage Pain  Description: Evaluate pain level, effect of treatment and patient response at regular intervals.  Minimize painful stimuli; coordinate care and adjust environment (e.g., light, noise, unnecessary movement); promote sleep/rest.  Match pharmacologic  analgesia to severity and type of pain mechanism (e.g., neuropathic, muscle, inflammatory); consider multimodal approach (e.g., nonopioid, opioid, adjuvant).  Provide medication at regular intervals; titrate to patient response; premedicate for painful procedures.  Manage breakthrough pain with additional doses; consider rotation or switching medication.  Monitor for signs of substance tolerance (increased dose to reach desired effect, decreased effect with same dose).  Manage medication-induced effects, such as constipation, nausea, pruritus, urinary retention, somnolence and dizziness.  Provide multimodal interventions, such as as physical activity, therapeutic exercise, yoga, TENS (transcutaneous electrical nerve stimulation) and manual therapy.  Train in functional activity modifications, such as body mechanics, posture, ergonomics, energy conservation and activity pacing.  Consider addition of complementary or alternative therapy, such as acupuncture, hypnosis or therapeutic touch.  Recent Flowsheet Documentation  Taken 3/29/2023 2100 by Gogo Baig RN  Medication Review/Management: medications reviewed  Intervention: Optimize Psychosocial Wellbeing  Description: Facilitate patient’s self-control over pain by providing pain information and allowing choices in treatment.  Consider and address emotional response to pain.  Explore and promote use of coping strategies; address barriers to successful coping.  Evaluate and assist with psychosocial, cultural and spiritual factors impacting pain.  Modify pain perception using techniques, such as distraction, mindfulness, guided imagery, meditation or music.  Assess for risk factors for developing chronic pain, such as depression, fear, pain avoidance and pain catastrophizing.  Consider referral for ongoing coping support, such as education, relaxation training and role of thoughts.  Recent Flowsheet Documentation  Taken 3/29/2023 2100 by Gogo Baig  RN  Diversional Activities:   television   smartphone   Goal Outcome Evaluation:  Plan of Care Reviewed With: patient

## 2023-03-30 NOTE — ANESTHESIA PROCEDURE NOTES
"TAP blocks      Patient reassessed immediately prior to procedure    Patient location during procedure: OR  Reason for block: at surgeon's request and post-op pain management  Performed by  CRNA/CAA: Giancarlo Corea CRNA  Preanesthetic Checklist  Completed: patient identified, IV checked, site marked, risks and benefits discussed, surgical consent, monitors and equipment checked, pre-op evaluation and timeout performed  Prep:  Pt Position: supine  Sterile barriers:cap, gloves, mask and washed/disinfected hands  Prep: ChloraPrep  Patient monitoring: blood pressure monitoring, continuous pulse oximetry and EKG  Procedure    Sedation: yes  Performed under: general  Guidance:ultrasound guided  Images:still images obtained, printed/placed on chart    Laterality:Bilateral  Block Type:TAP  Injection Technique:single-shot  Needle Type:short-bevel and echogenic  Needle Gauge:20 G  Resistance on Injection: none    Medications Used: bupivacaine PF (MARCAINE) 0.25 % injection - Injection   60 mL - 3/30/2023 2:05:00 PM  dexamethasone sodium phosphate injection - Injection   2 mg - 3/30/2023 2:05:00 PM      Medications  Comment:Block Injection:  LA dose divided between Right and Left block        Post Assessment  Injection Assessment: negative aspiration for heme, incremental injection and no paresthesia on injection  Patient Tolerance:comfortable throughout block  Complications:no  Additional Notes    Mid-Axillary/Lateral TAPs    A high-frequency linear transducer, with sterile cover, was placed in the midaxillary line between the ASIS and costal margin. The External Oblique Muscle (EOM), Internal Oblique Muscle (IOM), Transverse Abdominus Muscle (CAGE), and Peritoneum were identified. The insertion site was prepped in sterile fashion and then localized with 2-5 ml of 1% Lidocaine. Using ultrasound-guidance, a 20-gauge B-Johnson 4\" Ultraplex 360 non-stimulating echogenic needle was advanced in plane, from medial to lateral, until " the tip of the needle was in the fascial plane between the IOM and CAGE. 1-3ml of preservative free normal saline was used to hydro-dissect the fascial planes. After the fascial plane was verified, the local anesthetic (LA) was injected. The procedure was repeated on the opposite side for bilateral coverage. Aspiration every 5 ml to prevent intravascular injection. Injection was completed with negative aspiration of blood and negative intravascular injection. Injection pressures were normal with minimal resistance. Midaxillary TAPs should reach intercostal nerves T10- T11 and the subcostal nerve T12.

## 2023-03-30 NOTE — ANESTHESIA PREPROCEDURE EVALUATION
Anesthesia Evaluation     Patient summary reviewed and Nursing notes reviewed                Airway   Mallampati: II  TM distance: >3 FB  Neck ROM: full  No difficulty expected  Dental - normal exam     Pulmonary - normal exam   (+) shortness of breath,   Cardiovascular - normal exam    (+) hypertension, hyperlipidemia,     ROS comment:   Stress/echo 2/23: normal EF, no significant valve disease; no ischemia/low risk study    Neuro/Psych- negative ROS  GI/Hepatic/Renal/Endo    (+) obesity,   thyroid problem hypothyroidism    ROS Comment: diverticulitis    Musculoskeletal     Abdominal  - normal exam    Bowel sounds: normal.   Substance History - negative use     OB/GYN negative ob/gyn ROS         Other   arthritis,                    Anesthesia Plan    ASA 2     general     (TAP blocks)  intravenous induction     Anesthetic plan, risks, benefits, and alternatives have been provided, discussed and informed consent has been obtained with: patient.    Plan discussed with CRNA.        CODE STATUS:    Level Of Support Discussed With: Patient  Code Status (Patient has no pulse and is not breathing): CPR (Attempt to Resuscitate)  Medical Interventions (Patient has pulse or is breathing): Full Support

## 2023-03-30 NOTE — CASE MANAGEMENT/SOCIAL WORK
Discharge Planning Assessment  [facility]     Patient Name: Heidy Whitman  MRN: [id number]  Today's Date: 3/30/2023    Admit Date: 3/29/2023    Plan: Home with family   Discharge Needs Assessment     Row Name 03/30/23 1008       Living Environment    People in Home spouse;child(baron), adult    Name(s) of People in Home Spouse, Jimi and adult son    Current Living Arrangements home    Potentially Unsafe Housing Conditions none    Primary Care Provided by self    Provides Primary Care For no one    Family Caregiver if Needed spouse    Quality of Family Relationships helpful;involved;supportive    Able to Return to Prior Arrangements yes       Resource/Environmental Concerns    Resource/Environmental Concerns none    Transportation Concerns none       Food Insecurity    Within the past 12 months, you worried that your food would run out before you got the money to buy more. Never true    Within the past 12 months, the food you bought just didn't last and you didn't have money to get more. Never true       Transition Planning    Patient/Family Anticipates Transition to home with family    Patient/Family Anticipated Services at Transition none    Transportation Anticipated family or friend will provide       Discharge Needs Assessment    Equipment Currently Used at Home none    Concerns to be Addressed denies needs/concerns at this time    Anticipated Changes Related to Illness none    Equipment Needed After Discharge none               Discharge Plan     Row Name 03/30/23 1009       Plan    Plan Home with family    Patient/Family in Agreement with Plan yes    Plan Comments I spoke with the patient at the bedside. She lives in [facility] with her spouse and adult son in a home. She is fully independent and is employed. She denied safety concerns in the home, does not use DME, and has access to food and medication. She anticipates going home at discharge. Her  will transport her. CM to follow and  assist as needed.    Final Discharge Disposition Code 01 - home or self-care              Continued Care and Services - Admitted Since 3/29/2023    Coordination has not been started for this encounter.     Selected Continued Care - Prior Encounters Includes continued care and service providers with selected services from prior encounters from 12/29/2022 to 3/30/2023    Discharged on 2/21/2023 Admission date: 2/16/2023 - Discharge disposition: Home or Self Care    Dialysis/Infusion     Service Provider Selected Services Address Phone Fax Patient Preferred    [facility] [address] [place] [address] [address], [facility] [id number] [phone] --                       Demographic Summary    No documentation.                Functional Status     Row Name 03/30/23 1007       Functional Status    Usual Activity Tolerance excellent    Current Activity Tolerance excellent       Physical Activity    On average, how many days per week do you engage in moderate to strenuous exercise (like a brisk walk)? 0 days    On average, how many minutes do you engage in exercise at this level? 0 min    Number of minutes of exercise per week 0       Assessment of Health Literacy    How often do you have someone help you read hospital materials? Never    How often do you have problems learning about your medical condition because of difficulty understanding written information? Never    How often do you have a problem understanding what is told to you about your medical condition? Never    How confident are you filling out medical forms by yourself? Extremely    Health Literacy Excellent       Functional Status, IADL    Medications independent    Meal Preparation independent    Housekeeping independent    Laundry independent    Shopping independent       Mental Status    General Appearance WDL WDL       Mental Status Summary    Recent Changes in Mental Status/Cognitive Functioning no  changes       Employment/    Employment Status employed full-time    Shift Worked first shift    Current or Previous Occupation healthcare               Psychosocial    No documentation.                Abuse/Neglect    No documentation.                Legal    No documentation.                Substance Abuse    No documentation.                Patient Forms    No documentation.                   Prachi Salvador RN

## 2023-03-30 NOTE — H&P
[facility] Medicine Services  HISTORY AND PHYSICAL    Patient Name: Heidy Whitman  : 1970  MRN: [id number]  Primary Care Physician: Itzel Long MD  Date of admission: 3/29/2023    Subjective   Subjective     Chief Complaint:  Abdominal pain and diarrhea    HPI:  Heidy Whitman is a 52 y.o. female with a history of hypertension, hyperlipidemia, RA, recurrent diverticulitis (x6), was discharged from here on 2023 with acute uncomplicated sigmoid diverticulitis.  She was followed by Dr. Corcoran and treated with Invanz.  Patient has been following with colorectal surgery with the plan for an outpatient elective colectomy.  Patient was seen by Dr. Stanley today with complaints of significant abdominal pain and frequent diarrhea.  She tested positive for norovirus 5 days ago.  Her symptoms have been persistent for approximately 10 days.  She endorses a loss of appetite, chills, fatigue, abdominal pain, diarrhea, nausea, and headaches.  No fevers, shortness of air, chest pain, dysuria, melena, vomiting, or any other complaints at this time.  She was referred to the ER for admission for IV antibiotics and reevaluation for potential colectomy during hospital stay.  Patient is being admitted to the Hospitalist for further evaluation and management.        Review of Systems   Constitutional: Positive for appetite change, chills and fatigue. Negative for fever.   Eyes: Negative.    Respiratory: Negative.    Cardiovascular: Negative.    Gastrointestinal: Positive for abdominal pain, diarrhea and nausea. Negative for abdominal distention, blood in stool and vomiting.   Endocrine: Negative.    Genitourinary: Negative.    Musculoskeletal: Negative.    Skin: Negative.    Allergic/Immunologic: Negative.    Neurological: Positive for headaches. Negative for dizziness, syncope, weakness and light-headedness.   Hematological: Negative.    Psychiatric/Behavioral:  Negative.             Personal History     Past Medical History:   Diagnosis Date   • Arthritis    • Breast injury     July 2022-right breast hit with car door   • Current use of steroid medication 2/16/2023   • Disease of thyroid gland    • Diverticulitis    • Edema    • Hyperlipidemia    • Hypertension    • Rheumatoid arthritis (HCC)              Past Surgical History:   Procedure Laterality Date   • ABDOMINAL SURGERY     • HYSTERECTOMY      age 30   • OOPHORECTOMY     • THYROID SURGERY         Family History:  family history includes Breast cancer in her paternal grandmother; COPD in her father; Colon cancer in her paternal grandfather; Heart disease in her father.     Social History:  reports that she has never smoked. She does not have any smokeless tobacco history on file. She reports that she does not currently use alcohol. She reports that she does not use drugs.  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        Medications:  Alirocumab, FLUoxetine, Loratadine-Pseudoephedrine, TiZANidine, Vitamin B-12, amitriptyline, furosemide, levothyroxine, losartan-hydrochlorothiazide, metoprolol tartrate, ondansetron ODT, and potassium chloride    Allergies   Allergen Reactions   • Gastrografin [Diatrizoate] Anaphylaxis   • Green Dyes Diarrhea   • Guaifenesin & Derivatives    • Lactose Intolerance (Gi) Unknown - High Severity   • Gabapentin Hives and Rash   • Pregabalin Hives and Rash       Objective   Objective     Vital Signs:   Temp:  [98.5 °F (36.9 °C)] 98.5 °F (36.9 °C)  Heart Rate:  [74] 74  Resp:  [16] 16  BP: (124)/(81) 124/81    Physical Exam   Constitutional: Awake, alert, resting in bed  Eyes: PERRLA, sclerae anicteric, no conjunctival injection  HENT: NCAT, mucous membranes moist  Neck: Supple, no thyromegaly, no lymphadenopathy, trachea midline  Respiratory: Clear to auscultation bilaterally, nonlabored respirations   Cardiovascular:  RRR, no murmurs, rubs, or gallops, palpable pedal pulses bilaterally  Gastrointestinal: Positive bowel sounds, soft, LLQ and suprapubic tenderness, nondistended  Musculoskeletal: No bilateral ankle edema, no clubbing or cyanosis to extremities  Psychiatric: Appropriate affect, cooperative  Neurologic: Oriented x 3, strength symmetric in all extremities, Cranial Nerves grossly intact to confrontation, speech clear  Skin: No rashes      Result Review:  I have personally reviewed the results from the time of this admission to 3/29/2023 20:37 EDT and agree with these findings:  [x]  Laboratory list / accordion  [x]  Microbiology  [x]  Radiology  []  EKG/Telemetry   []  Cardiology/Vascular   []  Pathology  [x]  Old records  []  Other:  Most notable findings include: creatinine 1.13, ast 49, alt 43    LAB RESULTS:      Lab 03/29/23  1801 03/24/23  1027   WBC 6.44 7.24   HEMOGLOBIN 13.7 14.6   HEMATOCRIT 42.1 44.1   PLATELETS 276 250   NEUTROS ABS 4.37 4.77   IMMATURE GRANS (ABS) 0.02 0.02   LYMPHS ABS 1.30 1.43   MONOS ABS 0.63 0.87   EOS ABS 0.10 0.11   MCV 90.7 89.6   SED RATE  --  19   CRP  --  0.49   PROCALCITONIN 0.18  --    LACTATE 0.8  --          Lab 03/29/23  1801 03/24/23  1027   SODIUM 139 143   POTASSIUM 4.5 4.5   CHLORIDE 105 106   CO2 25.0 27.0   ANION GAP 9.0 10.0   BUN 19 17   CREATININE 1.13* 0.65   EGFR 59.4* 107.4   GLUCOSE 108* 111*   CALCIUM 8.8 9.1         Lab 03/29/23  1801 03/24/23  1027   TOTAL PROTEIN 6.7 6.7   ALBUMIN 4.0 4.1   GLOBULIN 2.7 2.6   ALT (SGPT) 43* 65*   AST (SGOT) 49* 71*   BILIRUBIN 0.3 0.3   ALK PHOS 75 89         Lab 03/29/23  1801   PROBNP 93.4             Lab 03/29/23  1801   ABO TYPING A   RH TYPING Positive   ANTIBODY SCREEN Negative         Brief Urine Lab Results  (Last result in the past 365 days)      Color   Clarity   Blood   Leuk Est   Nitrite   Protein   CREAT   Urine HCG        02/16/23 1732 Yellow   Clear   Negative   Trace   Negative   Trace                Microbiology Results (last 10 days)     Procedure Component Value - Date/Time    Clostridioides difficile Toxin, PCR - Stool, Per Rectum [[id number]]  (Normal) Collected: 03/24/23 1255    Lab Status: Final result Specimen: Stool from Per Rectum Updated: 03/24/23 1401     C. Difficile Toxins by PCR Not Detected    Narrative:      The result indicates the absence of toxigenic C. difficile from stool specimen.           No radiology results from the last 24 hrs    Results for orders placed during the hospital encounter of 02/16/23    Adult Transthoracic Echo Complete w/ Color, Spectral and Contrast if necessary per protocol    Interpretation Summary  •  Left ventricular ejection fraction appears to be 66 - 70%.  •  Estimated right ventricular systolic pressure from tricuspid regurgitation is normal (<35 mmHg).      Assessment & Plan   Assessment & Plan       Diverticulitis    HTN (hypertension)    HLD (hyperlipidemia)    Hypothyroidism (acquired)    Elevated serum creatinine    Heidy Whitman is a 52 y.o. female with a history of hypertension, hyperlipidemia, RA, recurrent diverticulitis (x6), was discharged from here on 2/21/2023 with acute uncomplicated sigmoid diverticulitis.  She was followed by Dr. Corcoran and treated with Invanz.  Patient has been following with colorectal surgery with the plan for an outpatient elective colectomy.  Patient was seen by Dr. Stanley today with complaints of significant abdominal pain and frequent diarrhea.    She was admitted for IV antibiotics and reevaluation for potential colectomy during hospital stay.     Assessment and Plan:    Diverticulitis- recurrent  --Plans for laparoscopic low anterior resection, possible ostomy tomorrow with Dr. Stanley  --Consult Dr. Campuzano with urology in the a.m. to assist with perioperative cystoscopy and stents  -- CT abdomen pelvis pending  -- Zosyn  -- Bowel prep  -- N.p.o. after midnight  -- Consult Dr. Stanley with  colorectal surgery  -- Pain control  -- Consult WOC  -- IV fluids  -- am labs    Elevated creatinine  -- Creatinine 1.13  -- Baseline creatinine ~ 0.6-0.7  -- IV fluids  -- bmp in the am    Hypertension  Hyperlipidemia  -- Losartan, hydrochlorothiazide, metoprolol    Elevated LFTs, chronic  -- stable  -- ALT 43, AST 49    RA  -- On amitriptyline  -- Gets Enbrel and Praluent injections  -- Enbrel on hold  -- Follows with Dr. Adarsh Patton    Hypothyroidism  -- Continue Synthroid    DVT prophylaxis:  SCDS    CODE STATUS:    Level Of Support Discussed With: Patient  Code Status (Patient has no pulse and is not breathing): CPR (Attempt to Resuscitate)  Medical Interventions (Patient has pulse or is breathing): Full Support      Expected DischargeTBD        This note has been completed as part of a split-shared workflow.     Signature: Electronically signed by RORO Moya, 03/29/23, 9:37 PM EDT.  Total time spent: 60 minutes  Time spent includes time reviewing chart, face-to-face time, counseling patient/family/caregiver, ordering medications/tests/procedures, communicating with other health care professionals, documenting clinical information in the electronic health record, and coordination of care.      Patient seen and examined. She is nontoxic but chronically tortured intermittently with diveriticular flares which worsen her RA flares.  CT findings reviewed with patient.  Plan bowel prep and surgery tomorrow.    Janna Salazar MD 03/29/23 23:55 EDT

## 2023-03-30 NOTE — PLAN OF CARE
Problem: Adult Inpatient Plan of Care  Goal: Absence of Hospital-Acquired Illness or Injury  Intervention: Identify and Manage Fall Risk  Recent Flowsheet Documentation  Taken 3/30/2023 1200 by Breanne Martínez RN  Safety Promotion/Fall Prevention:   toileting scheduled   safety round/check completed   room organization consistent   nonskid shoes/slippers when out of bed  Taken 3/30/2023 0800 by Breanne Martínez RN  Safety Promotion/Fall Prevention:   toileting scheduled   safety round/check completed   room organization consistent   nonskid shoes/slippers when out of bed  Intervention: Prevent Skin Injury  Recent Flowsheet Documentation  Taken 3/30/2023 1200 by Breanne Martínez RN  Body Position: position changed independently  Skin Protection: adhesive use limited  Taken 3/30/2023 0800 by Breanne Martínez RN  Body Position: position changed independently  Skin Protection: adhesive use limited  Intervention: Prevent and Manage VTE (Venous Thromboembolism) Risk  Recent Flowsheet Documentation  Taken 3/30/2023 1200 by Breanne Martínez RN  Activity Management:   activity adjusted per tolerance   activity encouraged  Taken 3/30/2023 0800 by Breanne Martínez RN  Activity Management:   activity adjusted per tolerance   activity encouraged  Range of Motion: active ROM (range of motion) encouraged  Intervention: Prevent Infection  Recent Flowsheet Documentation  Taken 3/30/2023 1200 by Breanne Martínez RN  Infection Prevention:   environmental surveillance performed   equipment surfaces disinfected   hand hygiene promoted  Taken 3/30/2023 0800 by Breanne Martínez RN  Infection Prevention:   environmental surveillance performed   equipment surfaces disinfected   hand hygiene promoted  Goal: Readiness for Transition of Care  Intervention: Mutually Develop Transition Plan  Recent Flowsheet Documentation  Taken 3/30/2023 1153 by Breanne Martínez RN  Equipment Currently Used at Home: none     Problem: Pain  Acute  Goal: Acceptable Pain Control and Functional Ability  Intervention: Prevent or Manage Pain  Recent Flowsheet Documentation  Taken 3/30/2023 1200 by Breanne Martínez, RN  Sleep/Rest Enhancement: awakenings minimized  Medication Review/Management: medications reviewed  Taken 3/30/2023 0800 by Breanne Martínez, RN  Sleep/Rest Enhancement: awakenings minimized  Medication Review/Management: medications reviewed  Intervention: Optimize Psychosocial Wellbeing  Recent Flowsheet Documentation  Taken 3/30/2023 1200 by Breanne Martínez, RN  Supportive Measures: active listening utilized  Taken 3/30/2023 0800 by Breanne Martínez, RN  Supportive Measures: active listening utilized   Goal Outcome Evaluation:

## 2023-03-30 NOTE — ANESTHESIA POSTPROCEDURE EVALUATION
Patient: Heidy Whitman    Procedure Summary     Date: 03/30/23 Room / [facility]:  [facility]    Anesthesia Start: 1343 Anesthesia Stop: 1641    Procedures:       LAPAROSCOPIC LOWER ANTERIOR RESECTION, PROCTOSIGMOIDOSCOPY (Abdomen)      CYSTOSCOPY URETERAL CATHETER/STENT INSERTION (Bladder) Diagnosis:     Surgeons: Isidro Stanley MD; Marvin Campuzano MD Provider: Joe Jacob MD    Anesthesia Type: general ASA Status: 2          Anesthesia Type: general    Vitals  Vitals Value Taken Time   BP     Temp     Pulse 92 03/30/23 1640   Resp     SpO2 95 % 03/30/23 1640   Vitals shown include unvalidated device data.        Post Anesthesia Care and Evaluation    Patient location during evaluation: PACU  Patient participation: complete - patient participated  Level of consciousness: awake and alert  Pain management: adequate    Airway patency: patent  Anesthetic complications: No anesthetic complications  PONV Status: none  Cardiovascular status: hemodynamically stable and acceptable  Respiratory status: nonlabored ventilation, acceptable and nasal cannula  Hydration status: acceptable

## 2023-03-30 NOTE — PROGRESS NOTES
[facility] Medicine Services  PROGRESS NOTE    Patient Name: Heidy Whitman  : 1970  MRN: [id number]    Date of Admission: 3/29/2023  Primary Care Physician: Itzel Long MD    Subjective   Subjective     CC:  Recurrent abdominal pain     HPI:  Headache.  Some abd pain, not bad - better since having colon prep.  Awaits surgery today.     ROS:  This is 7th episode of presumed diverticulitis  Notes allergy to dilaudid and morphine, can tolerate ultram.     Objective   Objective     Vital Signs:   Temp:  [98.2 °F (36.8 °C)-98.9 °F (37.2 °C)] 98.2 °F (36.8 °C)  Heart Rate:  [67-77] 67  Resp:  [16-20] 18  BP: (104-125)/(62-84) 125/72     Physical Exam:  Constitutional: Awake, alert  Eyes: PERRLA, sclerae anicteric, no conjunctival injection  HENT: NCAT, mucous membranes moist  Neck: Supple, no thyromegaly, no lymphadenopathy, trachea midline  Respiratory: Clear to auscultation bilaterally, nonlabored respirations   Cardiovascular: RRR, no murmurs, rubs, or gallops, palpable pedal pulses bilaterally  Gastrointestinal: Positive bowel sounds, soft, nontender, nondistended  Musculoskeletal: No bilateral ankle edema, no clubbing or cyanosis to extremities  Psychiatric: Appropriate affect, cooperative  Neurologic: Oriented x 3, strength symmetric in all extremities, Cranial Nerves grossly intact to confrontation, speech clear  Skin: No rashes      Results Reviewed:  LAB RESULTS:      Lab 23  0346 23  1801 23  1027   WBC 4.70 6.44 7.24   HEMOGLOBIN 12.6 13.7 14.6   HEMATOCRIT 38.8 42.1 44.1   PLATELETS 230 276 250   NEUTROS ABS 2.77 4.37 4.77   IMMATURE GRANS (ABS) 0.01 0.02 0.02   LYMPHS ABS 1.15 1.30 1.43   MONOS ABS 0.63 0.63 0.87   EOS ABS 0.12 0.10 0.11   MCV 91.7 90.7 89.6   SED RATE  --   --  19   CRP  --   --  0.49   PROCALCITONIN  --  0.18  --    LACTATE  --  0.8  --    PROTIME 13.2  --   --          Lab 23  0346 23  1801 23  1027    SODIUM 139 139 143   POTASSIUM 3.6 4.5 4.5   CHLORIDE 103 105 106   CO2 25.0 25.0 27.0   ANION GAP 11.0 9.0 10.0   BUN 14 19 17   CREATININE 0.79 1.13* 0.65   EGFR 91.3 59.4* 107.4   GLUCOSE 98 108* 111*   CALCIUM 8.8 8.8 9.1         Lab 03/29/23  1801 03/24/23  1027   TOTAL PROTEIN 6.7 6.7   ALBUMIN 4.0 4.1   GLOBULIN 2.7 2.6   ALT (SGPT) 43* 65*   AST (SGOT) 49* 71*   BILIRUBIN 0.3 0.3   ALK PHOS 75 89         Lab 03/30/23  0346 03/29/23  1801   PROBNP  --  93.4   PROTIME 13.2  --    INR 1.01  --              Lab 03/29/23  2156 03/29/23  1801   ABO TYPING A A   RH TYPING Positive Positive   ANTIBODY SCREEN  --  Negative         Brief Urine Lab Results  (Last result in the past 365 days)      Color   Clarity   Blood   Leuk Est   Nitrite   Protein   CREAT   Urine HCG        02/16/23 1732 Yellow   Clear   Negative   Trace   Negative   Trace                 Microbiology Results Abnormal     None          CT Abdomen Pelvis With Contrast    Result Date: 3/29/2023  CT ABDOMEN PELVIS W CONTRAST Date of Exam: 3/29/2023 8:31 PM EDT Indication: Diverticulitis, complication suspected. Comparison: 2/19/2023 Technique: Axial CT images were obtained of the abdomen and pelvis following the uneventful intravenous administration intravenous contrast. Reconstructed coronal and sagittal images were also obtained. Automated exposure control and iterative construction methods were used. Findings: Lung Bases:   The visualized lung bases and lower mediastinal structures are unremarkable. Liver: The liver appears diffusely hypodense. No focal lesions. Biliary/Gallbladder:  The gallbladder is normal without evidence of radiopaque stones. The biliary tree is nondilated. Spleen: Spleen is normal in size and CT density. Pancreas:  Pancreas is normal. There is no evidence of pancreatic mass or peripancreatic fluid. Kidneys:  Kidneys are normal in size. There are no stones or hydronephrosis. Adrenals:  Adrenal glands are unremarkable.  Retroperitoneal/Lymph Nodes/Vasculature:  No retroperitoneal adenopathy is identified. Gastrointestinal/Mesentery:  The bowel loops are non-dilated without wall thickening or mass. The appendix as well-visualized. No secondary findings of appendicitis identified. Moderate stool burden present. There is mild diverticulosis of the sigmoid colon. No significant inflammatory changes identified. Single diverticula present within the descending colon (series 900 image 40). No significant inflammatory changes.. No evidence of obstruction. No free air. No mesenteric fluid collections identified. Bladder:  The bladder is normal. Genital:   Unremarkable      Bony Structures:   Visualized bony structures are consistent with the patient's age.     Impression: Impression: 1. No acute intra-abdominal or intrapelvic process. Previously described inflammatory changes have resolved. 2. Hepatic steatosis. 3. Ancillary findings as described above. Electronically Signed: Yandy Osborn  3/29/2023 8:59 PM EDT  Workstation ID: [id number]    TAP blocks    Result Date: 3/30/2023  Giancarlo Corea CRNA     3/30/2023  2:07 PM TAP blocks Patient reassessed immediately prior to procedure Patient location during procedure: OR Reason for block: at surgeon's request and post-op pain management Performed by CRNA/CAA: Giancarlo Corea CRNA Preanesthetic Checklist Completed: patient identified, IV checked, site marked, risks and benefits discussed, surgical consent, monitors and equipment checked, pre-op evaluation and timeout performed Prep: Pt Position: supine Sterile barriers:cap, gloves, mask and washed/disinfected hands Prep: ChloraPrep Patient monitoring: blood pressure monitoring, continuous pulse oximetry and EKG Procedure Sedation: yes Performed under: general Guidance:ultrasound guided Images:still images obtained, printed/placed on chart Laterality:Bilateral Block Type:TAP Injection Technique:single-shot Needle Type:short-bevel and echogenic  "Needle Gauge:20 G Resistance on Injection: none Medications Used: bupivacaine PF (MARCAINE) 0.25 % injection - Injection  60 mL - 3/30/2023 2:05:00 PM dexamethasone sodium phosphate injection - Injection  2 mg - 3/30/2023 2:05:00 PM Medications Comment:Block Injection:  LA dose divided between Right and Left block Post Assessment Injection Assessment: negative aspiration for heme, incremental injection and no paresthesia on injection Patient Tolerance:comfortable throughout block Complications:no Additional Notes Mid-Axillary/Lateral TAPs A high-frequency linear transducer, with sterile cover, was placed in the midaxillary line between the ASIS and costal margin. The External Oblique Muscle (EOM), Internal Oblique Muscle (IOM), Transverse Abdominus Muscle (CAGE), and Peritoneum were identified. The insertion site was prepped in sterile fashion and then localized with 2-5 ml of 1% Lidocaine. Using ultrasound-guidance, a 20-gauge B-Johnson 4\" Ultraplex 360 non-stimulating echogenic needle was advanced in plane, from medial to lateral, until the tip of the needle was in the fascial plane between the IOM and CAGE. 1-3ml of preservative free normal saline was used to hydro-dissect the fascial planes. After the fascial plane was verified, the local anesthetic (LA) was injected. The procedure was repeated on the opposite side for bilateral coverage. Aspiration every 5 ml to prevent intravascular injection. Injection was completed with negative aspiration of blood and negative intravascular injection. Injection pressures were normal with minimal resistance. Midaxillary TAPs should reach intercostal nerves T10- T11 and the subcostal nerve T12.        Results for orders placed during the hospital encounter of 02/16/23    Adult Transthoracic Echo Complete w/ Color, Spectral and Contrast if necessary per protocol    Interpretation Summary  •  Left ventricular ejection fraction appears to be 66 - 70%.  •  Estimated right ventricular " systolic pressure from tricuspid regurgitation is normal (<35 mmHg).      Current medications:  Scheduled Meds:[MAR Hold] amitriptyline, 10 mg, Oral, Nightly  [MAR Hold] FLUoxetine, 40 mg, Oral, Daily  [MAR Hold] losartan, 100 mg, Oral, Q24H   And  [MAR Hold] hydroCHLOROthiazide, 25 mg, Oral, Q24H  [MAR Hold] levothyroxine, 150 mcg, Oral, Daily  metoprolol tartrate, 25 mg, Oral, QAM  piperacillin-tazobactam, 3.375 g, Intravenous, Q8H  [MAR Hold] sodium chloride, 10 mL, Intravenous, Q12H  sodium chloride, 10 mL, Intravenous, Q12H  [MAR Hold] tiZANidine, 4 mg, Oral, Nightly  [MAR Hold] vitamin B-12, 1,000 mcg, Oral, Daily      Continuous Infusions:lactated ringers, 9 mL/hr, Last Rate: 125 mL/hr (03/30/23 1348)      PRN Meds:.•  fentanyl  •  HYDROmorphone  •  lactated ringers  •  lactated ringers  •  lidocaine PF 1%  •  midazolam  •  [MAR Hold] ondansetron  •  sodium chloride  •  [MAR Hold] sodium chloride  •  sodium chloride  •  [MAR Hold] sodium chloride  •  sodium chloride  •  [MAR Hold] traMADol    Assessment & Plan   Assessment & Plan     Active Hospital Problems    Diagnosis  POA   • **Diverticulitis [K57.92]  Yes   • Elevated serum creatinine [R79.89]  Yes   • HTN (hypertension) [I10]  Yes   • HLD (hyperlipidemia) [E78.5]  Yes   • Hypothyroidism (acquired) [E03.9]  Yes      Resolved Hospital Problems   No resolved problems to display.        Brief Hospital Course to date:  Heidy Whitman is a 52 y.o. female w HTN HL and rheumatoid arthritis.  She had recent diverticulitis and has failed outpt management.      Presumed diverticulitis  - failed outpt therapy who was readmitted with diverticular disease.    - seventh episode  - planned surgery today    Elevated creatinine  -- Creatinine 1.13  -- Baseline creatinine ~ 0.6-0.7  -- IV fluids  -- bmp in the am     Hypertension  Hyperlipidemia  -- Losartan, hydrochlorothiazide, metoprolol     Elevated LFTs, chronic  -- stable     RA  -- On amitriptyline  --  Gets Enbrel and Praluent injections  -- Enbrel on hold  -- Follows with Dr. Adarsh Patton     Hypothyroidism  -- Continue Synthroid     DVT prophylaxis:  SCDS    Expected Discharge Location and Transportation: home by car  Expected Discharge   Expected Discharge Date and Time     Expected Discharge Date Expected Discharge Time    Apr 2, 2023            DVT prophylaxis:  Mechanical DVT prophylaxis orders are present.          CODE STATUS:   Code Status and Medical Interventions:   Ordered at: 03/29/23 2036     Level Of Support Discussed With:    Patient     Code Status (Patient has no pulse and is not breathing):    CPR (Attempt to Resuscitate)     Medical Interventions (Patient has pulse or is breathing):    Full Support       Guerline Taylor MD  03/30/23

## 2023-03-30 NOTE — OP NOTE
[facility]  OPERATIVE REPORT      Heidy Whitman  3/30/2023    Pre-op Diagnosis:   Recurrent uncomplicated diverticulitis      Post-op Diagnosis:    Post-Op Diagnosis Codes:  Same      Procedure(s):  LAPAROSCOPIC LOWER ANTERIOR RESECTION, PROCTOSIGMOIDOSCOPY  CYSTOSCOPY URETERAL CATHETER/STENT INSERTION    Surgeon(s):  Isidro Stanley MD Stark, Timothy, MD    Anesthesia: General with Block    Staff:   Circulator: Gudelia Peñaloza RN; Marsha Stephens RN; Shara Cook RN  Scrub Person: Minor Khoury Drexel  Nursing Assistant: Noé Butcher PCT  Assistant: Deloris Finnegan PA-C    Assistant: Deloris Finnegan PA-C was responsible for performing the following activities: Retraction, Suction, Irrigation, Suturing, Closing and Placing Dressing and their skilled assistance was necessary for the success of this case.       Estimated Blood Loss: 75 mL    Urine Voided: * No values recorded between 3/30/2023  1:42 PM and 3/30/2023  4:29 PM *see anesthesia note    Specimens:                Specimens     ID Source Type Tests Collected By Collected At Frozen?    A Large Intestine, Sigmoid Colon Tissue · TISSUE PATHOLOGY EXAM   Isidro Stanley MD 3/30/23 1541     Description: sigmoid colon,  proximal rectum, donut    This specimen was not marked as sent.                Drains:   Urethral Catheter Silicone 16 Fr. (Active)       Findings: Significant sigmoid thickening especially in the proximal sigmoid colon, able to perform a successful laparoscopic low anterior resection with stapled end to end with a 29 mm EEA anastomosis, negative leak test.    Complications: None    Procedure in Detail:   After informed written consent was obtained, the patient was brought to the operating theater, timeout was performed with all parties in agreement.  General anesthesia was introduced, antibiotics were administered, the the patient's abdomen and perineum were  prepped and draped in the usual sterile fashion.  The procedure was started by Dr. Campuzano of urology performing cystoscopy with bilateral ureteral stent placement, please see his operative dictation for further details.    I then started my portion of the procedure by creating a 7 cm midline incision on either end of and through the umbilicus.  This was deepened until entry into the peritoneal cavity atraumatically.  A GelPort was placed and pneumoperitoneum was created.  On visual inspection, there is some minimal adhesive disease from her previous surgical explorations.  The bladder did appear to be adhesed over the midline, for this reason I opted to cheat my trocars more to the right abdomen.  A 5 mm and 12 mm trocar were placed in the right lower quadrant, and additional 5 mm trocar was then placed in the left hemiabdomen.  The sigmoid colon was noted to be quite fixed to the lateral wall, this made the mesentery quite difficult to manipulate, for this reason sigmoid colon was released from some of its lateral attachments with the use of electrocautery and the [place] LigaSure device.  Once this was performed, I was able to recreate normal anatomic locations, the mesentery was then scored where it meets the retroperitoneum of the sigmoid colon and a dissection was performed here medial to lateral sweeping down all retroperitoneal structures.  This was performed over the sacral promontory as well as cephalad towards the CEM.  The superior hemorrhoidal artery was identified and divided with a white load vascular stapler, I did decide to keep the CEM as this was a nononcologic case.  This dissection was then carried out laterally until reaching the abdominal wall.  The ureter was identified and protected throughout this.  I then released the remaining lateral attachments and the white line of [place] with the use of electrocautery.  The greater omentum was very redundant and adhesed to the proximal descending colon,  this was released with the use of LigaSure device.  This would allow adequate laxity.  I did not need to divide the inferior mesenteric vein.  I then carried my dissection into the pelvis, the lateral stalks were scored and divided partially in order to create some mobility.  I entered in the mesorectal plane posteriorly and swept down all the presacral fibers with the use of electrocautery.  I identified an appropriate division spot on the proximal rectum, this was after the tinea had splayed, after the loss of epiploic fat and over the the sacral promontory.  The associated mesorectum was divided with the LigaSure device.  The bowel was divided with a blue load 60 mm [place] stapler and a single firing.  I then decided upon a proximal margin, pneumoperitoneum was then aborted, and the colon was eviscerated from the abdomen.  The associated mesentery was divided with the LigaSure device.  A proximal margin was clamped and divided with a 10 blade which showed healthy and bleeding.  Pursestring was created with 2-0 Prolene here followed by insertion of a 29 mm EEA anvil.  This was then reduced back into the abdomen and pneumoperitoneum re-created.  A 29 mm EEA stapler was then advanced through the rectum and mated with the anvil, a tension-free end-to-end anastomosis was then created.  The pelvis was then instilled with sterile normal saline and proctosigmoidoscopy was performed.  This would note healthy nonbleeding anastomosis at 13 cm.  There is no signs of any air leak laparoscopically.  The pneumorectum was then allowed to dissipate.    Hemostasis was verified and all fluid was aspirated from the abdomen.  The right lower quadrant 12 mm port was closed with 0 Vicryl utilizing the Luisito Mercer.  Pneumoperitoneum was then aborted, the midline fascia was reapproximated with #1 PDS in a running fashion.  All incisions were then irrigated followed by closure with 3-0 Monocryl followed by skin glue. Ureteral  catheters were removed at the termination of the case and noted to be intact.     Patient tolerated the procedure well, was subsequently extubated and transferred to the recovery room.  I immediately spoke with the patient's  and friend after the procedure was complete to the them know the findings.    Isidro Stanley MD     Date: 3/30/2023  Time: 16:32 EDT

## 2023-03-30 NOTE — OP NOTE
Cystourethroscopy & Intraoperative Bilateral Catheter Placement Operative Report    Patient Name:  Heidy Whitman  YOB: 1970    Date of Surgery:  3/30/2023     Indications: 52-year-old female with history of uncomplicated diverticulitis.  She is planned to undergo low anterior resection with colorectal surgery.  Urology consulted for intraoperative ureteral identification catheter placement.    Pre-op Diagnosis:   Diverticulitis       Post-Op Diagnosis Codes:  Diverticulitis    Procedure/CPT® Codes: 09322, 47372    1. CYSTOSCOPY BILATERAL URETERAL CATHETER/STENT INSERTION  2. MODIFIER 50, BILATERAL STENT PLACEMENT  3. RICE CATHETER INSERTION  CHANGE    Staff:  Surgeon(s):  Marvin Campuzano MD      Anesthesia: General with Block    Estimated Blood Loss: 75 mL    Implants:    Implant Name Type Inv. Item Serial No.  Lot No. LRB No. Used Action   RELOAD STPLR ECHELON FLEX GST STND 60 WHT - [id number] Implant RELOAD STPLR ECHELON FLEX GST STND 60 WHT  [employer]  DIV OF J AND J [id number] N/A 1 Implanted   STPLR PWR ECHELONCIRCULAR CRV 3D PRELD 29MM - [id number] Implant STPLR PWR ECHELONCIRCULAR CRV 3D PRELD 29MM  [employer]  DIV OF J AND J [id number] N/A 1 Implanted   RELOAD ECHELON FLEX GST REG 3.6MM BRITTANY - [id number] Implant RELOAD ECHELON FLEX GST REG 3.6MM BRITTANY  [employer]  DIV OF J AND J [id number] N/A 1 Implanted       Specimen:          Specimens     ID Source Type Tests Collected By Collected At Frozen?    A Large Intestine, Sigmoid Colon Tissue · TISSUE PATHOLOGY EXAM   Isidro Stanley MD 3/30/23 4025     Description: sigmoid colon,  proximal rectum, donut              Findings:   1.  Normal urinary bladder  2.  Placement of bilateral 5 [place] ureteral identification catheter  3.  Placement of Rice catheter    Complications: None immediate    Description of Procedure:   The patient was identified in the preoperative holding area where informed consent  was reviewed and signed. The patient was transported the operating room per anesthesia and placed supine on the operating table. Smooth endotracheal intubation was performed without issue after administration of general anesthesia. The patient was then placed in the dorsal lithotomy position where genitals were prepped and draped in the usual sterile fashion. A brief timeout was performed identifying the correct patient procedure and laterality. Perioperative antibiotics were administered. All pressure points were padded.      Procedure began by inserting a 22 [place] cystoscope atraumatically per the patient's urethra. Upon entering the bladder formal cystoscopy was performed identifying bilateral orthotopic ureteral orifices and no evidence of tumor, stone, foreign body. Attention was then turned to the right ureteral orifice. A 5 Fr open ended ureteral catheter was inserted into the distal ureter and passed up to the level right renal collecting system without difficulty.    Attention was then turned to the left ureteral orifice.  A 5 Fr open ended ureteralcatheter was inserted into the distal ureter and passed up to the level left renal collecting system without difficulty.        A 16F catheter was placed.  10 cc placed in the balloon.  The ureteral catheters were affixed to the Cee catheter.     Patient remained under general anesthesia.  The patient will continue scheduled procedure with colorectal service, Dr. Lizeth MD. Urology service available to assist in any way during the procedure.    Marvin Campuzano MD     Date: 3/30/2023  Time: 18:56 EDT

## 2023-03-30 NOTE — NURSING NOTE
Wocn consulted for pre-op stoma site marking.     Surgeon: Dr. Stanley    Ostomy type: Patient marked for ileostomy and colostomy    Patient assessed, lying, sitting, leaning forward.    Quadrant marked: All 4 quadrants marked avoiding the transverse umbilical crease.  Patient does have adequate space for pouching in the lower quadrants.  The upper quadrants were marked a little bit higher than normal to allow for pouching to avoid this crease.    Education provided: Patient is familiar with ostomies that she has taken care of ostomies before.  Woc explained Woc role and reassured patients that we will be there for all of her ostomy needs including supplies and teaching.    Woc will follow.     Please contact with questions or concerns.

## 2023-03-30 NOTE — CONSULTS
INFECTIOUS DISEASE CONSULT/INITIAL HOSPITAL VISIT    Heidy Whitman  1970  [id number]    Date of Consult: 3/30/2023    Admission Date: 3/29/2023      Requesting Provider: Chuck Enrique MD  Evaluating Physician: Michael Corcoran MD    Reason for Consultation: Diverticulitis    History of present illness:    Patient is a 52 y.o. female With rheumatoid arthritis on Enbrel therapy who is seen today for reassessment of recurrent sigmoid diverticulitis.  She has suffered from over 5 episodes of diverticulitis. She was recently admitted on 2/17/23 after she failed to respond to Cipro/Flagyl as an outpatient. Her CT scan revealed sigmoid diverticulitis without an abscess. I initially saw her on 2/20/23. She was discharged on outpatient intravenous Invanz via peripheral IV in our office. She clinically improved with decreased abdominal pain.  Plans were made for her to proceed with sigmoidectomy.  Approximately 1.5-2 weeks ago she developed worsening abdominal discomfort with diarrhea.  She was placed back on Cipro/Flagyl by Dr. Stanley. She failed to improve.  With persistent diarrhea.  I saw her on 3/24 and obtained a stool C. Difficile PCR which was negative.  On 3/28 switched her antibiotic regimen to cefpodoxime/Flagyl. Dr. Stanley sent [employer] rectal swab which returned positive for norovirus yesterday  Dr. Stanley called her yesterday and she complained of persistent abdominal symptoms and diarrhea. Arrangements were made for her to be admitted for reassessment and possible surgical intervention. Her white blood cell count was 6.44, her pro-calcitonin was normal at 0.18, and an abdominal/pelvic CT scan revealed resolution of her previous inflammatory changes. She has been afebrile since her admission.  Dr. Stanley has decided to proceed with surgical intervention today.She is currently on Zosyn.  She continues to complain of some left lower quadrant abdominal discomfort.  She has  diarrhea although this has decreased somewhat over the last 24 hours.  She has anorexia but denies vomiting.    Past Medical History:   Diagnosis Date   • Arthritis    • Breast injury     July 2022-right breast hit with car door   • Current use of steroid medication 2/16/2023   • Disease of thyroid gland    • Diverticulitis    • Edema    • Hyperlipidemia    • Hypertension    • Rheumatoid arthritis (HCC)        Past Surgical History:   Procedure Laterality Date   • ABDOMINAL SURGERY     • HYSTERECTOMY      age 30   • OOPHORECTOMY     • THYROID SURGERY         Family History   Problem Relation Age of Onset   • COPD Father    • Heart disease Father    • Breast cancer Paternal Grandmother         age unknown   • Colon cancer Paternal Grandfather    • Ovarian cancer Neg Hx        Social History     Socioeconomic History   • Marital status:    Tobacco Use   • Smoking status: Never   Vaping Use   • Vaping Use: Never used   Substance and Sexual Activity   • Alcohol use: Not Currently     Comment: occasional use   • Drug use: No   • Sexual activity: Defer       Allergies   Allergen Reactions   • Gastrografin [Diatrizoate] Anaphylaxis   • Green Dyes Diarrhea   • Guaifenesin & Derivatives    • Lactose Intolerance (Gi) Unknown - High Severity   • Gabapentin Hives and Rash   • Pregabalin Hives and Rash         Medication:    Current Facility-Administered Medications:   •  amitriptyline (ELAVIL) tablet 10 mg, 10 mg, Oral, Nightly, Janna Salazar MD, 10 mg at 03/29/23 2118  •  FLUoxetine (PROzac) capsule 40 mg, 40 mg, Oral, Daily, Janna Salazar MD  •  losartan (COZAAR) tablet 100 mg, 100 mg, Oral, Q24H **AND** hydroCHLOROthiazide (HYDRODIURIL) tablet 25 mg, 25 mg, Oral, Q24H, Janna Salazar MD  •  levothyroxine (SYNTHROID, LEVOTHROID) tablet 150 mcg, 150 mcg, Oral, Daily, Janna Salazar MD  •  metoprolol tartrate (LOPRESSOR) tablet 25 mg, 25 mg, Oral, QAMMartin Kathryn E, MD  •  ondansetron (ZOFRAN) injection  4 mg, 4 mg, Intravenous, Q6H PRN, Isidro Stanley MD, 4 mg at 03/29/23 2324  •  piperacillin-tazobactam (ZOSYN) 3.375 g in iso-osmotic dextrose 50 ml (premix), 3.375 g, Intravenous, Q8H, Janna Salazar MD, 3.375 g at 03/29/23 2320  •  sodium chloride 0.9 % flush 10 mL, 10 mL, Intravenous, Q12H, Bailee Romero APRN, 10 mL at 03/29/23 2119  •  sodium chloride 0.9 % flush 10 mL, 10 mL, Intravenous, PRN, Bailee Romero APRN  •  sodium chloride 0.9 % infusion 40 mL, 40 mL, Intravenous, PRN, Bailee Romero APRN  •  tiZANidine (ZANAFLEX) tablet 4 mg, 4 mg, Oral, Nightly, Janna Salazar MD, 4 mg at 03/29/23 2118  •  traMADol (ULTRAM) tablet 50 mg, 50 mg, Oral, Q6H PRN, Bailee Romero APRN, 50 mg at 03/30/23 0357  •  vitamin B-12 (CYANOCOBALAMIN) tablet 1,000 mcg, 1,000 mcg, Oral, Daily, Janna Salazar MD    Antibiotics:  Anti-Infectives (From admission, onward)    Ordered     Dose/Rate Route Frequency Start Stop    03/29/23 1753  neomycin (MYCIFRADIN) tablet 1,000 mg        Note to Pharmacy: Colon surgery ppx: flagyl and neomycin on 3/29 at 1830, 1930 and 3/30 at 0200. For surgery 3/30   Ordering Provider: Isidro Stanley MD    1,000 mg Oral Once 03/30/23 0200 03/30/23 0109    03/29/23 1753  metroNIDAZOLE (FLAGYL) tablet 500 mg        Note to Pharmacy: Colon surgery ppx: flagyl and neomycin on 3/29 at 1830, 1930 and 3/30 at 0200. For surgery 3/30   Ordering Provider: Isidro Stanley MD    500 mg Oral Once 03/30/23 0200 03/30/23 0109    03/29/23 1725  piperacillin-tazobactam (ZOSYN) 3.375 g in iso-osmotic dextrose 50 ml (premix)        Ordering Provider: Janna Salazar MD    3.375 g  over 4 Hours Intravenous Every 8 Hours 03/30/23 0000 04/03/23 2359    03/29/23 1753  neomycin (MYCIFRADIN) tablet 1,000 mg        Note to Pharmacy: Colon surgery ppx: flagyl and neomycin on 3/29 at 1830, 1930 and 3/30 at 0200. For surgery 3/30   Ordering Provider: Isidro Stanley MD     1,000 mg Oral Once 23 1930 23 1753  metroNIDAZOLE (FLAGYL) tablet 500 mg        Note to Pharmacy: Colon surgery ppx: flagyl and neomycin on 3/29 at 1830, 1930 and 3/30 at 0200. For surgery 3/30   Ordering Provider: Isidro Stanley MD    500 mg Oral Once 23 19323 1753  neomycin (MYCIFRADIN) tablet 1,000 mg        Note to Pharmacy: Colon surgery ppx: flagyl and neomycin on 3/29 at 1830, 1930 and 3/30 at 0200. For surgery 3/30   Ordering Provider: Isidro Stanley MD    1,000 mg Oral Once 23 18423 1753  metroNIDAZOLE (FLAGYL) tablet 500 mg        Note to Pharmacy: Colon surgery ppx: flagyl and neomycin on 3/29 at 1830, 1930 and 3/30 at 0200. For surgery 3/30   Ordering Provider: Isidro Stanley MD    500 mg Oral Once 23 18323 1725  piperacillin-tazobactam (ZOSYN) 3.375 g in iso-osmotic dextrose 50 ml (premix)        Ordering Provider: Janna Salazar MD    3.375 g  over 30 Minutes Intravenous Once 23 1904            Review of Systems:  Constitutional-- No Fever, chills or sweats.  She complains of malaise and fatigue.  HEENT-- No new vision, hearing or throat complaints.   CV-- No chest painor  Heart murmur  Resp-- No SOB/cough  GI- She has a history of diverticulitis she complains of anorexia and nausea.  She complains of left lower quadrant abdominal pain and diarrhea.  -- No dysuria, hematuria, or flank pain.  Denies hesitancy, urgency or flank pain.  Heme- No active bruising or bleeding;  MS-- no swelling or pain in the bones or joints of arms/legs.    Neuro-- No acute focal weakness or numbness in the arms or legs.    Skin--No rashes or lesions      Physical Exam:   Vital Signs  Temp (24hrs), Av.7 °F (37.1 °C), Min:98.5 °F (36.9 °C), Max:98.9 °F (37.2 °C)    Temp  Min: 98.5 °F (36.9 °C)  Max: 98.9 °F (37.2 °C)  BP  Min: 104/62  Max: 124/81  Pulse  Min:  74  Max: 77  Resp  Min: 16  Max: 16  SpO2  Min: 98 %  Max: 98 %    GENERAL: Awake and alert, in no acute distress.   HEENT: Normocephalic, atraumatic.  PERRL. EOMI. No conjunctival injection. No icterus. Oropharynx clear without evidence of thrush or exudate..  NECK: Supple .  LYMPH: No cervical, axillary or inguinal lymphadenopathy.  HEART: RRR; No murmur, rubs, gallops.   LUNGS: Clear to auscultation bilaterally without wheezing, rales, rhonchi. Normal respiratory effort. Nonlabored.   ABDOMEN: Soft, nontender, nondistended.  No rebound or guarding. NO mass or HSM.  EXT:  No cyanosis, clubbing or edema.   :  Without Cee catheter.  MSK: No joint effusions or erythema  SKIN: Warm and dry without cutaneous eruptions on Inspection/palpation.    NEURO: Oriented to PPT.  Motor 5/5 strength  PSYCHIATRIC: Normal insight and judgment. Cooperative with PE    Laboratory Data    Results from last 7 days   Lab Units 03/30/23  0346 03/29/23  1801 03/24/23  1027   WBC 10*3/mm3 4.70 6.44 7.24   HEMOGLOBIN g/dL 12.6 13.7 14.6   HEMATOCRIT % 38.8 42.1 44.1   PLATELETS 10*3/mm3 230 276 250     Results from last 7 days   Lab Units 03/30/23  0346   SODIUM mmol/L 139   POTASSIUM mmol/L 3.6   CHLORIDE mmol/L 103   CO2 mmol/L 25.0   BUN mg/dL 14   CREATININE mg/dL 0.79   GLUCOSE mg/dL 98   CALCIUM mg/dL 8.8     Results from last 7 days   Lab Units 03/29/23  1801   ALK PHOS U/L 75   BILIRUBIN mg/dL 0.3   ALT (SGPT) U/L 43*   AST (SGOT) U/L 49*     Results from last 7 days   Lab Units 03/24/23  1027   SED RATE mm/hr 19     Results from last 7 days   Lab Units 03/24/23  1027   CRP mg/dL 0.49     Results from last 7 days   Lab Units 03/29/23  1801   LACTATE mmol/L 0.8     Results from last 7 days   Lab Units 03/24/23  1027   CK TOTAL U/L 75         Estimated Creatinine Clearance: 97.8 mL/min (by C-G formula based on SCr of 0.79 mg/dL).      Microbiology:  No results found for: ACANTHNAEG, AFBCX, BPERTUSSISCX, BLOODCX  No results found  for: BCIDPCR, CXREFLEX, CSFCX, CULTURETIS  No results found for: CULTURES, HSVCX, URCX  No results found for: EYECULTURE, GCCX, HSVCULTURE, LABHSV  No results found for: LEGIONELLA, MRSACX, MUMPSCX, MYCOPLASCX  No results found for: NOCARDIACX, STOOLCX  No results found for: THROATCX, UNSTIMCULT, URINECX, CULTURE, VZVCULTUR  No results found for: VIRALCULTU, WOUNDCX        Radiology:  Imaging Results (Last 72 Hours)     Procedure Component Value Units Date/Time    CT Abdomen Pelvis With Contrast [[id number]] Collected: 03/29/23 2047     Updated: 03/29/23 2102    Narrative:      CT ABDOMEN PELVIS W CONTRAST    Date of Exam: 3/29/2023 8:31 PM EDT    Indication: Diverticulitis, complication suspected.    Comparison: 2/19/2023    Technique: Axial CT images were obtained of the abdomen and pelvis following the uneventful intravenous administration intravenous contrast. Reconstructed coronal and sagittal images were also obtained. Automated exposure control and iterative   construction methods were used.    Findings:  Lung Bases:    The visualized lung bases and lower mediastinal structures are unremarkable.    Liver:  The liver appears diffusely hypodense. No focal lesions.    Biliary/Gallbladder:   The gallbladder is normal without evidence of radiopaque stones. The biliary tree is nondilated.    Spleen:  Spleen is normal in size and CT density.    Pancreas:   Pancreas is normal. There is no evidence of pancreatic mass or peripancreatic fluid.    Kidneys:   Kidneys are normal in size. There are no stones or hydronephrosis.    Adrenals:   Adrenal glands are unremarkable.    Retroperitoneal/Lymph Nodes/Vasculature:   No retroperitoneal adenopathy is identified.    Gastrointestinal/Mesentery:   The bowel loops are non-dilated without wall thickening or mass. The appendix as well-visualized. No secondary findings of appendicitis identified. Moderate stool burden present. There is mild diverticulosis of the sigmoid colon. No  significant   inflammatory changes identified. Single diverticula present within the descending colon (series 900 image 40). No significant inflammatory changes.. No evidence of obstruction. No free air. No mesenteric fluid collections identified.    Bladder:   The bladder is normal.    Genital:    Unremarkable        Bony Structures:    Visualized bony structures are consistent with the patient's age.        Impression:      Impression:    1. No acute intra-abdominal or intrapelvic process. Previously described inflammatory changes have resolved.  2. Hepatic steatosis.  3. Ancillary findings as described above.    Electronically Signed: Yandy Osborn    3/29/2023 8:59 PM EDT    Workstation ID: [id number]            Impression:   1.  Recurrent sigmoid diverticulitis-she is improved radiographically and by laboratory studies but it is certainly reasonable to proceed with surgical intervention since she has suffered from multiple recurrent episodes.  2.  Norovirus-much of her recent diarrhea may have been secondary to norovirus.  There is no evidence of C. Difficile infection.  Her diarrhea course has been more prolonged than we usually see with norovirus  3.  Rheumatoid arthritis-she will need to remain off of Enbrel therapy until she is recovered from her surgery.    PLAN/RECOMMENDATIONS:   Thank you for asking us to see Heidy Whitman, I recommend the followin.  Continue intravenous Zosyn  2.  Surgical intervention today per Dr. Stanley  3.  Contact precautions for norovirus       Michael Corcoran MD  3/30/2023  07:22 EDT

## 2023-03-30 NOTE — NURSING NOTE
Touched base with Dr. Stanley at bedside regarding heavy bleeding in coelho cath and increased patient pain. Assessment made, patient had bilateral ureteral stenting which will cause the bleeding. Coelho cath to be kept until after reassessed in AM. Current pain regimen utilized. Patient to chew gum and add Tramadol on unit to assist with incisional pain, but gas discomfort will take ambulation and time. Discussed with patient. Verbalizes understanding.

## 2023-03-30 NOTE — PROGRESS NOTES
"[facility] ([facility]        Length of stay: 1 days    Subjective: Patient did fair overnight.  She still having some abdominal cramping pain.  She denies any nausea or vomiting.  Yesterday, she went for CT scan, this does not show significant inflammation of the sigmoid colon per my read.  I also do not see any other acute abnormalities.  Previous CT scans have shown some small bowel intussusception, I see no evidence of that today and that is usually transient.    She wishes still to proceed with a colectomy.    Physical Exam:  Blood pressure 119/72, pulse 77, temperature 98.9 °F (37.2 °C), temperature source Oral, resp. rate 16, height 167.6 cm (66\"), weight 92.9 kg (204 lb 12.8 oz), SpO2 98 %.    Abd: Soft, tender to palpation suprapubically in the left lower quadrant.  No peritoneal signs    Labs past 24 hours:  Lab Results (last 24 hours)     Procedure Component Value Units Date/Time    Basic Metabolic Panel [[id number]]  (Normal) Collected: 03/30/23 0346    Specimen: Blood Updated: 03/30/23 0520     Glucose 98 mg/dL      BUN 14 mg/dL      Creatinine 0.79 mg/dL      Sodium 139 mmol/L      Potassium 3.6 mmol/L      Chloride 103 mmol/L      CO2 25.0 mmol/L      Calcium 8.8 mg/dL      BUN/Creatinine Ratio 17.7     Anion Gap 11.0 mmol/L      eGFR 91.3 mL/min/1.73     Narrative:      GFR Normal >60  Chronic Kidney Disease <60  Kidney Failure <15      Protime-INR [[id number]]  (Normal) Collected: 03/30/23 0346    Specimen: Blood Updated: 03/30/23 0502     Protime 13.2 Seconds      INR 1.01    CBC Auto Differential [[id number]]  (Abnormal) Collected: 03/30/23 0346    Specimen: Blood Updated: 03/30/23 0449     WBC 4.70 10*3/mm3      RBC 4.23 10*6/mm3      Hemoglobin 12.6 g/dL      Hematocrit 38.8 %      MCV 91.7 fL      MCH 29.8 pg      MCHC 32.5 g/dL      RDW 12.4 %      RDW-SD 41.3 fl      MPV 10.4 fL      Platelets 230 10*3/mm3      Neutrophil % 58.9 %      Lymphocyte % 24.5 %      " "Monocyte % 13.4 %      Eosinophil % 2.6 %      Basophil % 0.4 %      Immature Grans % 0.2 %      Neutrophils, Absolute 2.77 10*3/mm3      Lymphocytes, Absolute 1.15 10*3/mm3      Monocytes, Absolute 0.63 10*3/mm3      Eosinophils, Absolute 0.12 10*3/mm3      Basophils, Absolute 0.02 10*3/mm3      Immature Grans, Absolute 0.01 10*3/mm3      nRBC 0.0 /100 WBC     Procalcitonin [[id number]]  (Normal) Collected: 03/29/23 1801    Specimen: Blood Updated: 03/29/23 1907     Procalcitonin 0.18 ng/mL     Narrative:      As a Marker for Sepsis (Non-Neonates):    1. <0.5 ng/mL represents a low risk of severe sepsis and/or septic shock.  2. >2 ng/mL represents a high risk of severe sepsis and/or septic shock.    As a Marker for Lower Respiratory Tract Infections that require antibiotic therapy:    PCT on Admission    Antibiotic Therapy       6-12 Hrs later    >0.5                Strongly Recommended  >0.25 - <0.5        Recommended   0.1 - 0.25          Discouraged              Remeasure/reassess PCT  <0.1                Strongly Discouraged     Remeasure/reassess PCT    As 28 day mortality risk marker: \"Change in Procalcitonin Result\" (>80% or <=80%) if Day 0 (or Day 1) and Day 4 values are available. Refer to http://www.[employer]-pct-calculator.com    Change in PCT <=80%  A decrease of PCT levels below or equal to 80% defines a positive change in PCT test result representing a higher risk for 28-day all-cause mortality of patients diagnosed with severe sepsis for septic shock.    Change in PCT >80%  A decrease of PCT levels of more than 80% defines a negative change in PCT result representing a lower risk for 28-day all-cause mortality of patients diagnosed with severe sepsis or septic shock.       Comprehensive Metabolic Panel [[id number]]  (Abnormal) Collected: 03/29/23 1801    Specimen: Blood Updated: 03/29/23 1905     Glucose 108 mg/dL      BUN 19 mg/dL      Creatinine 1.13 mg/dL      Sodium 139 mmol/L      Potassium 4.5 " mmol/L      Chloride 105 mmol/L      CO2 25.0 mmol/L      Calcium 8.8 mg/dL      Total Protein 6.7 g/dL      Albumin 4.0 g/dL      ALT (SGPT) 43 U/L      AST (SGOT) 49 U/L      Alkaline Phosphatase 75 U/L      Total Bilirubin 0.3 mg/dL      Globulin 2.7 gm/dL      Comment: Calculated Result        A/G Ratio 1.5 g/dL      BUN/Creatinine Ratio 16.8     Anion Gap 9.0 mmol/L      eGFR 59.4 mL/min/1.73     Narrative:      GFR Normal >60  Chronic Kidney Disease <60  Kidney Failure <15      proBNP [[id number]]  (Normal) Collected: 03/29/23 1801    Specimen: Blood Updated: 03/29/23 1901     proBNP 93.4 pg/mL     Narrative:      Among patients with dyspnea, NT-proBNP is highly sensitive for the detection of acute congestive heart failure. In addition NT-proBNP of <300 pg/ml effectively rules out acute congestive heart failure with 99% negative predictive value.    Results may be falsely decreased if patient taking Biotin.      Lactic Acid, Plasma [[id number]]  (Normal) Collected: 03/29/23 1801    Specimen: Blood Updated: 03/29/23 1854     Lactate 0.8 mmol/L      Comment: Falsely depressed results may occur on samples drawn from patients receiving N-Acetylcysteine (NAC) or Metamizole.       CBC & Differential [[id number]]  (Normal) Collected: 03/29/23 1801    Specimen: Blood Updated: 03/29/23 1833    Narrative:      The following orders were created for panel order CBC & Differential.  Procedure                               Abnormality         Status                     ---------                               -----------         ------                     CBC Auto Differential[[id number]]        Normal              Final result                 Please view results for these tests on the individual orders.    CBC Auto Differential [[id number]]  (Normal) Collected: 03/29/23 1801    Specimen: Blood Updated: 03/29/23 1833     WBC 6.44 10*3/mm3      RBC 4.64 10*6/mm3      Hemoglobin 13.7 g/dL      Hematocrit 42.1 %      MCV 90.7 fL       MCH 29.5 pg      MCHC 32.5 g/dL      RDW 12.3 %      RDW-SD 41.1 fl      MPV 10.1 fL      Platelets 276 10*3/mm3      Neutrophil % 67.8 %      Lymphocyte % 20.2 %      Monocyte % 9.8 %      Eosinophil % 1.6 %      Basophil % 0.3 %      Immature Grans % 0.3 %      Neutrophils, Absolute 4.37 10*3/mm3      Lymphocytes, Absolute 1.30 10*3/mm3      Monocytes, Absolute 0.63 10*3/mm3      Eosinophils, Absolute 0.10 10*3/mm3      Basophils, Absolute 0.02 10*3/mm3      Immature Grans, Absolute 0.02 10*3/mm3      nRBC 0.0 /100 WBC           I/O last shift:  No intake/output data recorded.       Pathology:  * No orders in the log *.    CT scan performed on 3/29/2023 shows: Redundant sigmoid colon with no significant inflammation on the CT scan.  Previous inflammation in the proximal mid sigmoid colon has resolved.        Assessment and Plan:  This is a 52-year-old female with hypertension, hyperlipidemia and rheumatoid arthritis who was readmitted with diverticular disease.  I had concern that she had failed outpatient management for her recurrent uncomplicated diverticulitis.    Her CT scan does not not show any significant sigmoid inflammation however even on her last admission, her second CT scan showed no inflammation.  She has never had a white count with regards to her diverticulitis in the last month and a half.    I think that she is likely suffering from smoldering diverticulitis with subclinical laboratory and radiographic findings however some significant debilitating pain.  I did discuss with her that perhaps diverticulitis is not the only etiology influencing her current situation but I have no additional pathology to pin this.  She was recently diagnosed with norovirus but this is typically only a several day self-limiting course, she has been symptomatic for nearly 2 weeks now.      Plan  We will proceed to the OR this afternoon for a laparoscopic low anterior resection, possible colostomy versus ileostomy  I  have requested Dr. Campuzano to help with that case for cystoscopy and stent placement  N.p.o. except for medications  I have requested our ostomy nurses to stop by marker for both an ileostomy and a colostomy      Lastly, once again I did discuss the risk and benefits associated the procedure including bleeding, infection, injury to surrounding structures including arteries, veins, nerves, small bowel, colon, liver, spleen.  Additionally we discussed injury to the ureter, risk of anastomotic leak, the need for reoperation, risk of no improvement in pain or diarrhea as well as DVT, PE, heart attack, stroke and death.  Patient voices understanding wishes to proceed.    Isidro Stanley MD  [facility]  03/30/23  06:32 EDT

## 2023-03-31 LAB
ANION GAP SERPL CALCULATED.3IONS-SCNC: 11 MMOL/L (ref 5–15)
BASOPHILS # BLD AUTO: 0.01 10*3/MM3 (ref 0–0.2)
BASOPHILS NFR BLD AUTO: 0.1 % (ref 0–1.5)
BUN SERPL-MCNC: 13 MG/DL (ref 6–20)
BUN/CREAT SERPL: 22.8 (ref 7–25)
CALCIUM SPEC-SCNC: 8.3 MG/DL (ref 8.6–10.5)
CHLORIDE SERPL-SCNC: 101 MMOL/L (ref 98–107)
CO2 SERPL-SCNC: 24 MMOL/L (ref 22–29)
CREAT SERPL-MCNC: 0.57 MG/DL (ref 0.57–1)
DEPRECATED RDW RBC AUTO: 39.4 FL (ref 37–54)
EGFRCR SERPLBLD CKD-EPI 2021: 110.9 ML/MIN/1.73
EOSINOPHIL # BLD AUTO: 0 10*3/MM3 (ref 0–0.4)
EOSINOPHIL NFR BLD AUTO: 0 % (ref 0.3–6.2)
ERYTHROCYTE [DISTWIDTH] IN BLOOD BY AUTOMATED COUNT: 12 % (ref 12.3–15.4)
GLUCOSE BLDC GLUCOMTR-MCNC: 114 MG/DL (ref 70–130)
GLUCOSE BLDC GLUCOMTR-MCNC: 114 MG/DL (ref 70–130)
GLUCOSE BLDC GLUCOMTR-MCNC: 125 MG/DL (ref 70–130)
GLUCOSE BLDC GLUCOMTR-MCNC: 158 MG/DL (ref 70–130)
GLUCOSE BLDC GLUCOMTR-MCNC: 202 MG/DL (ref 70–130)
GLUCOSE SERPL-MCNC: 177 MG/DL (ref 65–99)
HCT VFR BLD AUTO: 36.2 % (ref 34–46.6)
HGB BLD-MCNC: 12.2 G/DL (ref 12–15.9)
IMM GRANULOCYTES # BLD AUTO: 0.04 10*3/MM3 (ref 0–0.05)
IMM GRANULOCYTES NFR BLD AUTO: 0.5 % (ref 0–0.5)
LYMPHOCYTES # BLD AUTO: 0.43 10*3/MM3 (ref 0.7–3.1)
LYMPHOCYTES NFR BLD AUTO: 5 % (ref 19.6–45.3)
MAGNESIUM SERPL-MCNC: 1.9 MG/DL (ref 1.6–2.6)
MCH RBC QN AUTO: 29.8 PG (ref 26.6–33)
MCHC RBC AUTO-ENTMCNC: 33.7 G/DL (ref 31.5–35.7)
MCV RBC AUTO: 88.5 FL (ref 79–97)
MONOCYTES # BLD AUTO: 0.34 10*3/MM3 (ref 0.1–0.9)
MONOCYTES NFR BLD AUTO: 4 % (ref 5–12)
NEUTROPHILS NFR BLD AUTO: 7.76 10*3/MM3 (ref 1.7–7)
NEUTROPHILS NFR BLD AUTO: 90.4 % (ref 42.7–76)
NRBC BLD AUTO-RTO: 0 /100 WBC (ref 0–0.2)
PLATELET # BLD AUTO: 252 10*3/MM3 (ref 140–450)
PMV BLD AUTO: 10.6 FL (ref 6–12)
POTASSIUM SERPL-SCNC: 3.7 MMOL/L (ref 3.5–5.2)
RBC # BLD AUTO: 4.09 10*6/MM3 (ref 3.77–5.28)
SODIUM SERPL-SCNC: 136 MMOL/L (ref 136–145)
WBC NRBC COR # BLD: 8.58 10*3/MM3 (ref 3.4–10.8)

## 2023-03-31 PROCEDURE — 83735 ASSAY OF MAGNESIUM: CPT | Performed by: STUDENT IN AN ORGANIZED HEALTH CARE EDUCATION/TRAINING PROGRAM

## 2023-03-31 PROCEDURE — 82962 GLUCOSE BLOOD TEST: CPT

## 2023-03-31 PROCEDURE — 25010000002 ENOXAPARIN PER 10 MG: Performed by: STUDENT IN AN ORGANIZED HEALTH CARE EDUCATION/TRAINING PROGRAM

## 2023-03-31 PROCEDURE — 25010000002 DIPHENHYDRAMINE PER 50 MG: Performed by: INTERNAL MEDICINE

## 2023-03-31 PROCEDURE — 99232 SBSQ HOSP IP/OBS MODERATE 35: CPT | Performed by: NURSE PRACTITIONER

## 2023-03-31 PROCEDURE — 25010000002 DIPHENHYDRAMINE PER 50 MG: Performed by: STUDENT IN AN ORGANIZED HEALTH CARE EDUCATION/TRAINING PROGRAM

## 2023-03-31 PROCEDURE — 97530 THERAPEUTIC ACTIVITIES: CPT

## 2023-03-31 PROCEDURE — 80048 BASIC METABOLIC PNL TOTAL CA: CPT | Performed by: STUDENT IN AN ORGANIZED HEALTH CARE EDUCATION/TRAINING PROGRAM

## 2023-03-31 PROCEDURE — 25010000002 PIPERACILLIN SOD-TAZOBACTAM PER 1 G: Performed by: STUDENT IN AN ORGANIZED HEALTH CARE EDUCATION/TRAINING PROGRAM

## 2023-03-31 PROCEDURE — 25010000002 HYDROMORPHONE PER 4 MG: Performed by: INTERNAL MEDICINE

## 2023-03-31 PROCEDURE — 97161 PT EVAL LOW COMPLEX 20 MIN: CPT

## 2023-03-31 PROCEDURE — 85025 COMPLETE CBC W/AUTO DIFF WBC: CPT | Performed by: STUDENT IN AN ORGANIZED HEALTH CARE EDUCATION/TRAINING PROGRAM

## 2023-03-31 RX ORDER — DIPHENHYDRAMINE HYDROCHLORIDE 50 MG/ML
25 INJECTION INTRAMUSCULAR; INTRAVENOUS EVERY 4 HOURS PRN
Status: DISCONTINUED | OUTPATIENT
Start: 2023-03-31 | End: 2023-04-03 | Stop reason: HOSPADM

## 2023-03-31 RX ORDER — SIMETHICONE 80 MG
80 TABLET,CHEWABLE ORAL 4 TIMES DAILY PRN
Status: DISCONTINUED | OUTPATIENT
Start: 2023-03-31 | End: 2023-04-03 | Stop reason: HOSPADM

## 2023-03-31 RX ORDER — HYDROMORPHONE HYDROCHLORIDE 1 MG/ML
0.5 INJECTION, SOLUTION INTRAMUSCULAR; INTRAVENOUS; SUBCUTANEOUS EVERY 4 HOURS PRN
Status: DISCONTINUED | OUTPATIENT
Start: 2023-03-31 | End: 2023-04-02

## 2023-03-31 RX ORDER — DIPHENHYDRAMINE HYDROCHLORIDE 50 MG/ML
25 INJECTION INTRAMUSCULAR; INTRAVENOUS EVERY 6 HOURS PRN
Status: DISCONTINUED | OUTPATIENT
Start: 2023-03-31 | End: 2023-03-31

## 2023-03-31 RX ORDER — TRAMADOL HYDROCHLORIDE 50 MG/1
100 TABLET ORAL EVERY 4 HOURS PRN
Status: DISCONTINUED | OUTPATIENT
Start: 2023-03-31 | End: 2023-04-02

## 2023-03-31 RX ORDER — NAPROXEN 250 MG/1
250 TABLET ORAL 2 TIMES DAILY WITH MEALS
Status: DISCONTINUED | OUTPATIENT
Start: 2023-03-31 | End: 2023-04-03 | Stop reason: HOSPADM

## 2023-03-31 RX ADMIN — DIAZEPAM 5 MG: 5 TABLET ORAL at 17:29

## 2023-03-31 RX ADMIN — DIPHENHYDRAMINE HYDROCHLORIDE 25 MG: 50 INJECTION, SOLUTION INTRAMUSCULAR; INTRAVENOUS at 13:24

## 2023-03-31 RX ADMIN — NAPROXEN 250 MG: 250 TABLET ORAL at 08:51

## 2023-03-31 RX ADMIN — TAZOBACTAM SODIUM AND PIPERACILLIN SODIUM 3.38 G: 375; 3 INJECTION, SOLUTION INTRAVENOUS at 22:19

## 2023-03-31 RX ADMIN — DIPHENHYDRAMINE HYDROCHLORIDE 25 MG: 50 INJECTION, SOLUTION INTRAMUSCULAR; INTRAVENOUS at 19:18

## 2023-03-31 RX ADMIN — HYDROMORPHONE HYDROCHLORIDE 0.5 MG: 1 INJECTION, SOLUTION INTRAMUSCULAR; INTRAVENOUS; SUBCUTANEOUS at 00:12

## 2023-03-31 RX ADMIN — TIZANIDINE 4 MG: 4 TABLET ORAL at 19:50

## 2023-03-31 RX ADMIN — POTASSIUM CHLORIDE, DEXTROSE MONOHYDRATE AND SODIUM CHLORIDE 75 ML/HR: 150; 5; 450 INJECTION, SOLUTION INTRAVENOUS at 08:16

## 2023-03-31 RX ADMIN — METOPROLOL TARTRATE 25 MG: 25 TABLET, FILM COATED ORAL at 08:29

## 2023-03-31 RX ADMIN — HYDROMORPHONE HYDROCHLORIDE 0.5 MG: 1 INJECTION, SOLUTION INTRAMUSCULAR; INTRAVENOUS; SUBCUTANEOUS at 19:19

## 2023-03-31 RX ADMIN — TRAMADOL HYDROCHLORIDE 100 MG: 50 TABLET, COATED ORAL at 15:22

## 2023-03-31 RX ADMIN — LOSARTAN POTASSIUM 100 MG: 50 TABLET, FILM COATED ORAL at 08:30

## 2023-03-31 RX ADMIN — FLUOXETINE 40 MG: 20 CAPSULE ORAL at 08:29

## 2023-03-31 RX ADMIN — DIPHENHYDRAMINE HYDROCHLORIDE 25 MG: 50 INJECTION INTRAMUSCULAR; INTRAVENOUS at 04:53

## 2023-03-31 RX ADMIN — AMITRIPTYLINE HYDROCHLORIDE 10 MG: 10 TABLET, FILM COATED ORAL at 19:51

## 2023-03-31 RX ADMIN — DIAZEPAM 5 MG: 5 TABLET ORAL at 10:33

## 2023-03-31 RX ADMIN — DIPHENHYDRAMINE HYDROCHLORIDE 25 MG: 50 INJECTION, SOLUTION INTRAMUSCULAR; INTRAVENOUS at 08:19

## 2023-03-31 RX ADMIN — SIMETHICONE 80 MG: 80 TABLET, CHEWABLE ORAL at 15:22

## 2023-03-31 RX ADMIN — TRAMADOL HYDROCHLORIDE 50 MG: 50 TABLET, FILM COATED ORAL at 03:14

## 2023-03-31 RX ADMIN — TAZOBACTAM SODIUM AND PIPERACILLIN SODIUM 3.38 G: 375; 3 INJECTION, SOLUTION INTRAVENOUS at 00:12

## 2023-03-31 RX ADMIN — HYDROMORPHONE HYDROCHLORIDE 0.5 MG: 1 INJECTION, SOLUTION INTRAMUSCULAR; INTRAVENOUS; SUBCUTANEOUS at 13:25

## 2023-03-31 RX ADMIN — NAPROXEN 250 MG: 250 TABLET ORAL at 17:29

## 2023-03-31 RX ADMIN — ACETAMINOPHEN 1000 MG: 500 TABLET ORAL at 00:12

## 2023-03-31 RX ADMIN — ACETAMINOPHEN 1000 MG: 500 TABLET ORAL at 13:25

## 2023-03-31 RX ADMIN — POTASSIUM CHLORIDE, DEXTROSE MONOHYDRATE AND SODIUM CHLORIDE 75 ML/HR: 150; 5; 450 INJECTION, SOLUTION INTRAVENOUS at 19:42

## 2023-03-31 RX ADMIN — HYDROMORPHONE HYDROCHLORIDE 0.5 MG: 1 INJECTION, SOLUTION INTRAMUSCULAR; INTRAVENOUS; SUBCUTANEOUS at 04:53

## 2023-03-31 RX ADMIN — TAZOBACTAM SODIUM AND PIPERACILLIN SODIUM 3.38 G: 375; 3 INJECTION, SOLUTION INTRAVENOUS at 15:22

## 2023-03-31 RX ADMIN — Medication 10 ML: at 19:51

## 2023-03-31 RX ADMIN — HYDROMORPHONE HYDROCHLORIDE 0.5 MG: 1 INJECTION, SOLUTION INTRAMUSCULAR; INTRAVENOUS; SUBCUTANEOUS at 08:19

## 2023-03-31 RX ADMIN — TRAMADOL HYDROCHLORIDE 100 MG: 50 TABLET, COATED ORAL at 22:24

## 2023-03-31 RX ADMIN — ACETAMINOPHEN 1000 MG: 500 TABLET ORAL at 19:50

## 2023-03-31 RX ADMIN — ENOXAPARIN SODIUM 40 MG: 40 INJECTION SUBCUTANEOUS at 08:30

## 2023-03-31 RX ADMIN — DIPHENHYDRAMINE HYDROCHLORIDE 25 MG: 50 INJECTION INTRAMUSCULAR; INTRAVENOUS at 00:12

## 2023-03-31 RX ADMIN — TAZOBACTAM SODIUM AND PIPERACILLIN SODIUM 3.38 G: 375; 3 INJECTION, SOLUTION INTRAVENOUS at 08:28

## 2023-03-31 RX ADMIN — ACETAMINOPHEN 1000 MG: 500 TABLET ORAL at 08:30

## 2023-03-31 RX ADMIN — CYANOCOBALAMIN TAB 1000 MCG 1000 MCG: 1000 TAB at 08:30

## 2023-03-31 RX ADMIN — SIMETHICONE 80 MG: 80 TABLET, CHEWABLE ORAL at 19:49

## 2023-03-31 RX ADMIN — TRAMADOL HYDROCHLORIDE 100 MG: 50 TABLET, COATED ORAL at 08:10

## 2023-03-31 RX ADMIN — HYDROCHLOROTHIAZIDE 25 MG: 25 TABLET ORAL at 08:29

## 2023-03-31 RX ADMIN — DIAZEPAM 5 MG: 5 TABLET ORAL at 03:14

## 2023-03-31 RX ADMIN — LEVOTHYROXINE SODIUM 150 MCG: 150 TABLET ORAL at 08:29
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
## 2023-03-31 NOTE — PLAN OF CARE
Goal Outcome Evaluation:  Plan of Care Reviewed With: patient       VSS. RA. Alert and oriented. Gave ultram, valium and benadryl ordered for dilaudid. Slept ok. Red tinged urine in coelho bag. Gave nausea medicine once. Turns self in bed. No BM.    Was instructed to leave coelho in until after urologist sees pt.       Progress: improving     Problem: Adult Inpatient Plan of Care  Goal: Plan of Care Review  Outcome: Ongoing, Progressing  Flowsheets (Taken 3/31/2023 0249)  Progress: improving  Plan of Care Reviewed With: patient  Goal: Patient-Specific Goal (Individualized)  Outcome: Ongoing, Progressing  Goal: Absence of Hospital-Acquired Illness or Injury  Outcome: Ongoing, Progressing  Intervention: Identify and Manage Fall Risk  Recent Flowsheet Documentation  Taken 3/31/2023 0200 by Jesi Daily RN  Safety Promotion/Fall Prevention:  • activity supervised  • assistive device/personal items within reach  • clutter free environment maintained  Taken 3/31/2023 0000 by Jesi Daily RN  Safety Promotion/Fall Prevention:  • activity supervised  • assistive device/personal items within reach  Taken 3/30/2023 2200 by Jesi Daily RN  Safety Promotion/Fall Prevention:  • activity supervised  • assistive device/personal items within reach  Taken 3/30/2023 2000 by Jesi Daily RN  Safety Promotion/Fall Prevention:  • activity supervised  • assistive device/personal items within reach  Intervention: Prevent Skin Injury  Recent Flowsheet Documentation  Taken 3/31/2023 0200 by Jesi Daily RN  Body Position: position changed independently  Skin Protection: adhesive use limited  Taken 3/31/2023 0000 by Jesi Daily RN  Body Position: position changed independently  Skin Protection: adhesive use limited  Taken 3/30/2023 2200 by Jesi Daily RN  Body Position: position changed independently  Skin Protection:  • adhesive use limited  • skin-to-skin areas padded  • skin-to-device areas  padded  • tubing/devices free from skin contact  Taken 3/30/2023 2000 by Jesi Daily RN  Body Position: weight shifting  Skin Protection: adhesive use limited  Intervention: Prevent and Manage VTE (Venous Thromboembolism) Risk  Recent Flowsheet Documentation  Taken 3/31/2023 0200 by Jesi Daily RN  Activity Management:  • activity adjusted per tolerance  • activity encouraged  Taken 3/31/2023 0000 by Jesi Daily RN  Activity Management:  • activity adjusted per tolerance  • activity encouraged  Taken 3/30/2023 2200 by Jesi Daily RN  Activity Management:  • activity adjusted per tolerance  • activity encouraged  VTE Prevention/Management:  • bilateral  • sequential compression devices on  Taken 3/30/2023 2000 by Jesi Daily RN  Activity Management:  • activity adjusted per tolerance  • activity encouraged  VTE Prevention/Management:  • bilateral  • sequential compression devices on  Intervention: Prevent Infection  Recent Flowsheet Documentation  Taken 3/30/2023 2200 by Jesi Daily RN  Infection Prevention:  • personal protective equipment utilized  • rest/sleep promoted  Goal: Optimal Comfort and Wellbeing  Outcome: Ongoing, Progressing  Intervention: Provide Person-Centered Care  Recent Flowsheet Documentation  Taken 3/30/2023 2000 by Jesi Daily RN  Trust Relationship/Rapport: care explained  Goal: Readiness for Transition of Care  Outcome: Ongoing, Progressing

## 2023-03-31 NOTE — CASE MANAGEMENT/SOCIAL WORK
Continued Stay Note  [facility]     Patient Name: Heidy Whitman  MRN: [id number]  Today's Date: 3/31/2023    Admit Date: 3/29/2023    Plan: Home   Discharge Plan     Row Name 03/31/23 1404       Plan    Plan Home    Patient/Family in Agreement with Plan other (see comments)    Plan Comments Pt was discussed in Medical Rounds today. Pt may discharge home over the weekend. No needs voiced or identified. Pt plans to D/C home and  to transport.  will continue to follow.    Final Discharge Disposition Code 01 - home or self-care               Discharge Codes    No documentation.               Expected Discharge Date and Time     Expected Discharge Date Expected Discharge Time    Apr 2, 2023             Rosanne Starr RN

## 2023-03-31 NOTE — PROGRESS NOTES
"[facility]    1 Day Post-Op   Length of stay: 2 days    Subjective: Patient is now postop day 1 from a laparoscopic low anterior resection with cystoscopy and stent placement.  She had some issues with pain control, this is worsened by her multiple allergies.  Eventually, she was able to get some sleep.    Physical Exam:  Blood pressure 112/71, pulse 95, temperature 98.1 °F (36.7 °C), temperature source Oral, resp. rate 16, height 167.6 cm (66\"), weight 92.9 kg (204 lb 12.8 oz), SpO2 94 %.    Abd:Soft, appropriately tender, nondistended.  Incisions are clean dry and intact with a little bit of bruising at the umbilicus.    Cee catheter with some blood-tinged urine, there is some sediment in the Cee bag however urine in the tube is clearing.    Labs past 24 hours:  Lab Results (last 24 hours)     Procedure Component Value Units Date/Time    Tissue Pathology Exam [[id number]] Collected: 03/30/23 1541    Specimen: Tissue from Large Intestine, Sigmoid Colon Updated: 03/31/23 0718    Basic Metabolic Panel [[id number]]  (Abnormal) Collected: 03/31/23 0331    Specimen: Blood Updated: 03/31/23 0602     Glucose 177 mg/dL      BUN 13 mg/dL      Creatinine 0.57 mg/dL      Sodium 136 mmol/L      Potassium 3.7 mmol/L      Chloride 101 mmol/L      CO2 24.0 mmol/L      Calcium 8.3 mg/dL      BUN/Creatinine Ratio 22.8     Anion Gap 11.0 mmol/L      eGFR 110.9 mL/min/1.73     Narrative:      GFR Normal >60  Chronic Kidney Disease <60  Kidney Failure <15      Magnesium [[id number]]  (Normal) Collected: 03/31/23 0331    Specimen: Blood Updated: 03/31/23 0602     Magnesium 1.9 mg/dL     CBC & Differential [[id number]]  (Abnormal) Collected: 03/31/23 0332    Specimen: Blood Updated: 03/31/23 0538    Narrative:      The following orders were created for panel order CBC & Differential.  Procedure                               Abnormality         Status                     ---------            "                    -----------         ------                     CBC Auto Differential[[id number]]        Abnormal            Final result                 Please view results for these tests on the individual orders.    CBC Auto Differential [[id number]]  (Abnormal) Collected: 03/31/23 0332    Specimen: Blood Updated: 03/31/23 0538     WBC 8.58 10*3/mm3      RBC 4.09 10*6/mm3      Hemoglobin 12.2 g/dL      Hematocrit 36.2 %      MCV 88.5 fL      MCH 29.8 pg      MCHC 33.7 g/dL      RDW 12.0 %      RDW-SD 39.4 fl      MPV 10.6 fL      Platelets 252 10*3/mm3      Neutrophil % 90.4 %      Lymphocyte % 5.0 %      Monocyte % 4.0 %      Eosinophil % 0.0 %      Basophil % 0.1 %      Immature Grans % 0.5 %      Neutrophils, Absolute 7.76 10*3/mm3      Lymphocytes, Absolute 0.43 10*3/mm3      Monocytes, Absolute 0.34 10*3/mm3      Eosinophils, Absolute 0.00 10*3/mm3      Basophils, Absolute 0.01 10*3/mm3      Immature Grans, Absolute 0.04 10*3/mm3      nRBC 0.0 /100 WBC     POC Glucose Once [[id number]]  (Abnormal) Collected: 03/31/23 0500    Specimen: Blood Updated: 03/31/23 0501     Glucose 158 mg/dL      Comment: Meter: [id number] : [id number] Otilio Carrizales       POC Glucose Once [[id number]]  (Abnormal) Collected: 03/31/23 0032    Specimen: Blood Updated: 03/31/23 0034     Glucose 202 mg/dL      Comment: Meter: [id number] : [id number] Killian Dennison             I/O last shift:  No intake/output data recorded.       Pathology:  Order Name Source Comment Collection Info Order Time   BASIC METABOLIC PANEL   Collected By: Ana Garcia 3/30/2023 10:04 PM   MAGNESIUM   Collected By: Ana Garcia 3/30/2023 10:04 PM     Release to patient   Routine Release        TISSUE PATHOLOGY EXAM Large Intestine, Sigmoid Colon  Collected By: Isidro Stanley MD 3/30/2023  3:42 PM     Release to patient   Routine Release        .    Assessment and Plan:    52-year-old female with ongoing smoldering diverticulitis now  postop day 1 from laparoscopic low anterior resection, cystoscopy with bilateral ureteral stent placement.    Overall, she is having some issues with pain control due to multiple allergies.  Urine is starting to clear, expected blood-tinged urine due to cystoscopy with temporary ureteral catheters.    Plan  Continue her multimodal pain control, I did increase her tramadol dose as well as add on scheduled naproxen.  We will hold on Toradol as there is some data in the literature to show that this increases risk of anastomotic leak  Incentive spirometry  Vitals every 4  Continue clear liquid diet for now and advance slowly as tolerated  We will discontinue Cee catheter, continue IV fluids for now  SCDs continue, Lovenox to start today  I will continue her antibiotics for now, while she is in-house    Isidro Stanley MD  [facility]  03/31/23  07:42 EDT

## 2023-03-31 NOTE — NURSING NOTE
Woc followed up with patient as the operation note stated no ostomy creation.    Patient is extremely happy and recovering stated that she has relieved that they finally found a pain control regiment that is working.  Discussed protein eating for healing and rest and ambulation and hydration.    Since no ostomy Woc will sign off.

## 2023-03-31 NOTE — PROGRESS NOTES
INFECTIOUS DISEASE Progress Note    Heidy Whitman  1970  [id number]    Admission Date: 3/29/2023      Requesting Provider: Chuck Enrique MD  Evaluating Physician: Michael Corcoran MD    Reason for Consultation: Diverticulitis    History of present illness:    3/30/23: Patient is a 52 y.o. female With rheumatoid arthritis on Enbrel therapy who is seen today for reassessment of recurrent sigmoid diverticulitis.  She has suffered from over 5 episodes of diverticulitis. She was recently admitted on 2/17/23 after she failed to respond to Cipro/Flagyl as an outpatient. Her CT scan revealed sigmoid diverticulitis without an abscess. I initially saw her on 2/20/23. She was discharged on outpatient intravenous Invanz via peripheral IV in our office. She clinically improved with decreased abdominal pain.  Plans were made for her to proceed with sigmoidectomy.  Approximately 1.5-2 weeks ago she developed worsening abdominal discomfort with diarrhea.  She was placed back on Cipro/Flagyl by Dr. Stanley. She failed to improve.  With persistent diarrhea.  I saw her on 3/24 and obtained a stool C. Difficile PCR which was negative.  On 3/28 switched her antibiotic regimen to cefpodoxime/Flagyl. Dr. Stanley sent [employer] rectal swab which returned positive for norovirus yesterday  Dr. Stanley called her yesterday and she complained of persistent abdominal symptoms and diarrhea. Arrangements were made for her to be admitted for reassessment and possible surgical intervention. Her white blood cell count was 6.44, her pro-calcitonin was normal at 0.18, and an abdominal/pelvic CT scan revealed resolution of her previous inflammatory changes. She has been afebrile since her admission.  Dr. Stanley has decided to proceed with surgical intervention today.She is currently on Zosyn.  She continues to complain of some left lower quadrant abdominal discomfort.  She has diarrhea although this has decreased  somewhat over the last 24 hours.  She has anorexia but denies vomiting.    3/31/23: She underwent surgical intervention yesterday. He has remained afebrile. Her white blood cell count today is 8.6.  Her creatinine is 0.57.  She denies uncontrolled abdominal pain.  I discussed her operative findings with Dr. Stanley today.    Past Medical History:   Diagnosis Date   • Arthritis    • Breast injury     July 2022-right breast hit with car door   • Current use of steroid medication 2/16/2023   • Disease of thyroid gland    • Diverticulitis    • Edema    • Hyperlipidemia    • Hypertension    • Rheumatoid arthritis (HCC)        Past Surgical History:   Procedure Laterality Date   • ABDOMINAL SURGERY     • HYSTERECTOMY      age 30   • OOPHORECTOMY     • THYROID SURGERY         Family History   Problem Relation Age of Onset   • COPD Father    • Heart disease Father    • Breast cancer Paternal Grandmother         age unknown   • Colon cancer Paternal Grandfather    • Ovarian cancer Neg Hx        Social History     Socioeconomic History   • Marital status:    Tobacco Use   • Smoking status: Never   Vaping Use   • Vaping Use: Never used   Substance and Sexual Activity   • Alcohol use: Not Currently     Comment: occasional use   • Drug use: No   • Sexual activity: Defer       Allergies   Allergen Reactions   • Gastrografin [Diatrizoate] Anaphylaxis   • Green Dyes Diarrhea   • Guaifenesin & Derivatives    • Lactose Intolerance (Gi) Unknown - High Severity   • Gabapentin Hives and Rash   • Pregabalin Hives and Rash         Medication:    Current Facility-Administered Medications:   •  acetaminophen (TYLENOL) tablet 1,000 mg, 1,000 mg, Oral, Q6H, Isidro Stanley MD, 1,000 mg at 03/31/23 0012  •  amitriptyline (ELAVIL) tablet 10 mg, 10 mg, Oral, Nightly, Isidro Stanley MD, 10 mg at 03/30/23 2122  •  dextrose 5 % and sodium chloride 0.45 % with KCl 20 mEq/L infusion, 75 mL/hr, Intravenous, Continuous, Lizeth  Isidro COLIN MD, Last Rate: 75 mL/hr at 03/30/23 1902, 75 mL/hr at 03/30/23 1902  •  diazePAM (VALIUM) tablet 5 mg, 5 mg, Oral, Q6H PRN, Isidro Stanley MD, 5 mg at 03/31/23 0314  •  diphenhydrAMINE (BENADRYL) injection 25 mg, 25 mg, Intravenous, Q6H PRN, Braeden Gómez III, DO, 25 mg at 03/31/23 0453  •  Enoxaparin Sodium (LOVENOX) syringe 40 mg, 40 mg, Subcutaneous, Daily, Isidro Stanley MD  •  FLUoxetine (PROzac) capsule 40 mg, 40 mg, Oral, Daily, Isidro Stanley MD, 40 mg at 03/30/23 0805  •  losartan (COZAAR) tablet 100 mg, 100 mg, Oral, Q24H, 100 mg at 03/30/23 0806 **AND** hydroCHLOROthiazide (HYDRODIURIL) tablet 25 mg, 25 mg, Oral, Q24H, Isidro Stanley MD, 25 mg at 03/30/23 0805  •  HYDROmorphone (DILAUDID) injection 0.5 mg, 0.5 mg, Intravenous, Q4H PRN, Braeden Gómez III, DO, 0.5 mg at 03/31/23 0453  •  levothyroxine (SYNTHROID, LEVOTHROID) tablet 150 mcg, 150 mcg, Oral, Daily, Isidro Stanley MD, 150 mcg at 03/30/23 0805  •  metoprolol tartrate (LOPRESSOR) tablet 25 mg, 25 mg, Oral, QAM, Isidro Stanley MD, 25 mg at 03/30/23 0805  •  [DISCONTINUED] Morphine sulfate (PF) injection 4 mg, 4 mg, Intravenous, Q4H PRN **AND** naloxone (NARCAN) injection 0.4 mg, 0.4 mg, Intravenous, Q5 Min PRN, Isidro Stanley MD  •  ondansetron (ZOFRAN) injection 4 mg, 4 mg, Intravenous, Q6H PRN, Isidro Stanley MD, 4 mg at 03/30/23 2037  •  piperacillin-tazobactam (ZOSYN) 3.375 g in iso-osmotic dextrose 50 ml (premix), 3.375 g, Intravenous, Q8H, Isidro Stanley MD, 3.375 g at 03/31/23 0012  •  sodium chloride 0.9 % flush 10 mL, 10 mL, Intravenous, Q12H, Isidro Stanley MD, 10 mL at 03/30/23 2057  •  sodium chloride 0.9 % flush 10 mL, 10 mL, Intravenous, PRN, Isidro Stanley MD  •  sodium chloride 0.9 % infusion 40 mL, 40 mL, Intravenous, PRN, Isidro Stanley MD  •  tiZANidine (ZANAFLEX) tablet 4 mg, 4 mg, Oral, Nightly, Isidro Stanley MD, 4 mg at  03/30/23 2037  •  traMADol (ULTRAM) tablet 50 mg, 50 mg, Oral, Q6H PRN, Phyllis Fletcher DO, 50 mg at 03/31/23 0314  •  vitamin B-12 (CYANOCOBALAMIN) tablet 1,000 mcg, 1,000 mcg, Oral, Daily, Isidro Stanley MD, 1,000 mcg at 03/30/23 0805    Antibiotics:  Anti-Infectives (From admission, onward)    Ordered     Dose/Rate Route Frequency Start Stop    03/29/23 1753  neomycin (MYCIFRADIN) tablet 1,000 mg        Note to Pharmacy: Colon surgery ppx: flagyl and neomycin on 3/29 at 1830, 1930 and 3/30 at 0200. For surgery 3/30   Ordering Provider: Isidro Stanley MD    1,000 mg Oral Once 03/30/23 0200 03/30/23 0109    03/29/23 1753  metroNIDAZOLE (FLAGYL) tablet 500 mg        Note to Pharmacy: Colon surgery ppx: flagyl and neomycin on 3/29 at 1830, 1930 and 3/30 at 0200. For surgery 3/30   Ordering Provider: Isidro Stanley MD    500 mg Oral Once 03/30/23 0200 03/30/23 0109    03/29/23 1725  piperacillin-tazobactam (ZOSYN) 3.375 g in iso-osmotic dextrose 50 ml (premix)        Ordering Provider: Isidro Stanley MD    3.375 g  over 4 Hours Intravenous Every 8 Hours 03/30/23 0000 04/03/23 2359    03/29/23 1753  neomycin (MYCIFRADIN) tablet 1,000 mg        Note to Pharmacy: Colon surgery ppx: flagyl and neomycin on 3/29 at 1830, 1930 and 3/30 at 0200. For surgery 3/30   Ordering Provider: Isidro Stanley MD    1,000 mg Oral Once 03/29/23 1930 03/29/23 2118    03/29/23 1753  metroNIDAZOLE (FLAGYL) tablet 500 mg        Note to Pharmacy: Colon surgery ppx: flagyl and neomycin on 3/29 at 1830, 1930 and 3/30 at 0200. For surgery 3/30   Ordering Provider: Isidro Stanley MD    500 mg Oral Once 03/29/23 1930 03/29/23 1956 03/29/23 1753  neomycin (MYCIFRADIN) tablet 1,000 mg        Note to Pharmacy: Colon surgery ppx: flagyl and neomycin on 3/29 at 1830, 1930 and 3/30 at 0200. For surgery 3/30   Ordering Provider: Isidro Stanley MD    1,000 mg Oral Once 03/29/23 1845 03/29/23 1956 03/29/23  1753  metroNIDAZOLE (FLAGYL) tablet 500 mg        Note to Pharmacy: Colon surgery ppx: flagyl and neomycin on 3/29 at 1830, 1930 and 3/30 at 0200. For surgery 3/30   Ordering Provider: Isidro Stanley MD    500 mg Oral Once 23 1830 23 1843    23 1725  piperacillin-tazobactam (ZOSYN) 3.375 g in iso-osmotic dextrose 50 ml (premix)        Ordering Provider: Janna Salazar MD    3.375 g  over 30 Minutes Intravenous Once 23 18123 1904            Review of Systems:  See HPI      Physical Exam:   Vital Signs  Temp (24hrs), Av °F (36.7 °C), Min:97.5 °F (36.4 °C), Max:98.7 °F (37.1 °C)    Temp  Min: 97.5 °F (36.4 °C)  Max: 98.7 °F (37.1 °C)  BP  Min: 109/66  Max: 137/89  Pulse  Min: 67  Max: 95  Resp  Min: 16  Max: 20  SpO2  Min: 92 %  Max: 99 %    GENERAL: Awake and alert, in no acute distress.   HEENT: Normocephalic, atraumatic.  PERRL. EOMI. No conjunctival injection. No icterus. Oropharynx clear without evidence of thrush or exudate..  NECK: Supple .  HEART: RRR; No murmur, rubs, gallops.   LUNGS: Clear to auscultation bilaterally without wheezing, rales, rhonchi. Normal respiratory effort. Nonlabored.   ABDOMEN: Soft, nontender, nondistended.  No rebound or guarding. NO mass or HSM.  EXT:  No cyanosis, clubbing or edema.   :  Without Cee catheter.  MSK: No joint effusions or erythema  SKIN: Warm and dry without cutaneous eruptions on Inspection/palpation.    NEURO: Oriented to PPT.  Motor 5/5 strength  PSYCHIATRIC: Normal insight and judgment. Cooperative with PE    Laboratory Data    Results from last 7 days   Lab Units 23  0332 23  0346 23  1801   WBC 10*3/mm3 8.58 4.70 6.44   HEMOGLOBIN g/dL 12.2 12.6 13.7   HEMATOCRIT % 36.2 38.8 42.1   PLATELETS 10*3/mm3 252 230 276     Results from last 7 days   Lab Units 23  0331   SODIUM mmol/L 136   POTASSIUM mmol/L 3.7   CHLORIDE mmol/L 101   CO2 mmol/L 24.0   BUN mg/dL 13   CREATININE mg/dL 0.57   GLUCOSE  mg/dL 177*   CALCIUM mg/dL 8.3*     Results from last 7 days   Lab Units 03/29/23  1801   ALK PHOS U/L 75   BILIRUBIN mg/dL 0.3   ALT (SGPT) U/L 43*   AST (SGOT) U/L 49*     Results from last 7 days   Lab Units 03/24/23  1027   SED RATE mm/hr 19     Results from last 7 days   Lab Units 03/24/23  1027   CRP mg/dL 0.49     Results from last 7 days   Lab Units 03/29/23  1801   LACTATE mmol/L 0.8     Results from last 7 days   Lab Units 03/24/23  1027   CK TOTAL U/L 75         Estimated Creatinine Clearance: 135.5 mL/min (by C-G formula based on SCr of 0.57 mg/dL).      Microbiology:  No results found for: ACANTHNAEG, AFBCX, BPERTUSSISCX, BLOODCX  No results found for: BCIDPCR, CXREFLEX, CSFCX, CULTURETIS  No results found for: CULTURES, HSVCX, URCX  No results found for: EYECULTURE, GCCX, HSVCULTURE, LABHSV  No results found for: LEGIONELLA, MRSACX, MUMPSCX, MYCOPLASCX  No results found for: NOCARDIACX, STOOLCX  No results found for: THROATCX, UNSTIMCULT, URINECX, CULTURE, VZVCULTUR  No results found for: VIRALCULTU, WOUNDCX        Radiology:  Imaging Results (Last 72 Hours)     Procedure Component Value Units Date/Time    CT Abdomen Pelvis With Contrast [[id number]] Collected: 03/29/23 2047     Updated: 03/29/23 2102    Narrative:      CT ABDOMEN PELVIS W CONTRAST    Date of Exam: 3/29/2023 8:31 PM EDT    Indication: Diverticulitis, complication suspected.    Comparison: 2/19/2023    Technique: Axial CT images were obtained of the abdomen and pelvis following the uneventful intravenous administration intravenous contrast. Reconstructed coronal and sagittal images were also obtained. Automated exposure control and iterative   construction methods were used.    Findings:  Lung Bases:    The visualized lung bases and lower mediastinal structures are unremarkable.    Liver:  The liver appears diffusely hypodense. No focal lesions.    Biliary/Gallbladder:   The gallbladder is normal without evidence of radiopaque stones.  The biliary tree is nondilated.    Spleen:  Spleen is normal in size and CT density.    Pancreas:   Pancreas is normal. There is no evidence of pancreatic mass or peripancreatic fluid.    Kidneys:   Kidneys are normal in size. There are no stones or hydronephrosis.    Adrenals:   Adrenal glands are unremarkable.    Retroperitoneal/Lymph Nodes/Vasculature:   No retroperitoneal adenopathy is identified.    Gastrointestinal/Mesentery:   The bowel loops are non-dilated without wall thickening or mass. The appendix as well-visualized. No secondary findings of appendicitis identified. Moderate stool burden present. There is mild diverticulosis of the sigmoid colon. No significant   inflammatory changes identified. Single diverticula present within the descending colon (series 900 image 40). No significant inflammatory changes.. No evidence of obstruction. No free air. No mesenteric fluid collections identified.    Bladder:   The bladder is normal.    Genital:    Unremarkable        Bony Structures:    Visualized bony structures are consistent with the patient's age.        Impression:      Impression:    1. No acute intra-abdominal or intrapelvic process. Previously described inflammatory changes have resolved.  2. Hepatic steatosis.  3. Ancillary findings as described above.    Electronically Signed: Yandy Osborn    3/29/2023 8:59 PM EDT    Workstation ID: [id number]            Impression:   1.  Recurrent sigmoid diverticulitis- status post laparoscopic low anterior resection  2.  Norovirus-much of her recent diarrhea may have been secondary to norovirus.  There is no evidence of C. Difficile infection.  Her diarrhea course has been more prolonged than we usually see with norovirus  3.  Rheumatoid arthritis-she will need to remain off of Enbrel therapy until she is recovered from her surgery.    PLAN/RECOMMENDATIONS:   1.  Continue intravenous Zosyn-we will probably discontinue Zosyn in the morning  2.  Continue contact/for  isolation for norovirus    I discussed her situation with Dr. Stanley today.       Michael Corcoran MD  3/31/2023  06:25 EDT
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
## 2023-03-31 NOTE — PLAN OF CARE
Goal Outcome Evaluation:  Plan of Care Reviewed With: patient, friend           Outcome Evaluation: Pt presents with pain and slight balance deficits contributing to impairments in ambulation. Pt will benefit from PT to address aforementioned deficits and return to PLOF. PT rec home with assist upon dc.

## 2023-04-01 LAB
ANION GAP SERPL CALCULATED.3IONS-SCNC: 8 MMOL/L (ref 5–15)
BASOPHILS # BLD AUTO: 0.03 10*3/MM3 (ref 0–0.2)
BASOPHILS NFR BLD AUTO: 0.5 % (ref 0–1.5)
BUN SERPL-MCNC: 8 MG/DL (ref 6–20)
BUN/CREAT SERPL: 10.3 (ref 7–25)
CALCIUM SPEC-SCNC: 8.2 MG/DL (ref 8.6–10.5)
CHLORIDE SERPL-SCNC: 103 MMOL/L (ref 98–107)
CO2 SERPL-SCNC: 27 MMOL/L (ref 22–29)
CREAT SERPL-MCNC: 0.78 MG/DL (ref 0.57–1)
DEPRECATED RDW RBC AUTO: 43 FL (ref 37–54)
EGFRCR SERPLBLD CKD-EPI 2021: 92.7 ML/MIN/1.73
EOSINOPHIL # BLD AUTO: 0.08 10*3/MM3 (ref 0–0.4)
EOSINOPHIL NFR BLD AUTO: 1.3 % (ref 0.3–6.2)
ERYTHROCYTE [DISTWIDTH] IN BLOOD BY AUTOMATED COUNT: 12.5 % (ref 12.3–15.4)
GLUCOSE BLDC GLUCOMTR-MCNC: 107 MG/DL (ref 70–130)
GLUCOSE BLDC GLUCOMTR-MCNC: 83 MG/DL (ref 70–130)
GLUCOSE BLDC GLUCOMTR-MCNC: 86 MG/DL (ref 70–130)
GLUCOSE BLDC GLUCOMTR-MCNC: 93 MG/DL (ref 70–130)
GLUCOSE SERPL-MCNC: 106 MG/DL (ref 65–99)
HCT VFR BLD AUTO: 35.1 % (ref 34–46.6)
HGB BLD-MCNC: 11.1 G/DL (ref 12–15.9)
IMM GRANULOCYTES # BLD AUTO: 0.02 10*3/MM3 (ref 0–0.05)
IMM GRANULOCYTES NFR BLD AUTO: 0.3 % (ref 0–0.5)
LYMPHOCYTES # BLD AUTO: 1.27 10*3/MM3 (ref 0.7–3.1)
LYMPHOCYTES NFR BLD AUTO: 20.4 % (ref 19.6–45.3)
MCH RBC QN AUTO: 29.5 PG (ref 26.6–33)
MCHC RBC AUTO-ENTMCNC: 31.6 G/DL (ref 31.5–35.7)
MCV RBC AUTO: 93.4 FL (ref 79–97)
MONOCYTES # BLD AUTO: 0.54 10*3/MM3 (ref 0.1–0.9)
MONOCYTES NFR BLD AUTO: 8.7 % (ref 5–12)
NEUTROPHILS NFR BLD AUTO: 4.29 10*3/MM3 (ref 1.7–7)
NEUTROPHILS NFR BLD AUTO: 68.8 % (ref 42.7–76)
NRBC BLD AUTO-RTO: 0 /100 WBC (ref 0–0.2)
PLATELET # BLD AUTO: 232 10*3/MM3 (ref 140–450)
PMV BLD AUTO: 10.3 FL (ref 6–12)
POTASSIUM SERPL-SCNC: 3.8 MMOL/L (ref 3.5–5.2)
RBC # BLD AUTO: 3.76 10*6/MM3 (ref 3.77–5.28)
SODIUM SERPL-SCNC: 138 MMOL/L (ref 136–145)
WBC NRBC COR # BLD: 6.23 10*3/MM3 (ref 3.4–10.8)

## 2023-04-01 PROCEDURE — 80048 BASIC METABOLIC PNL TOTAL CA: CPT | Performed by: STUDENT IN AN ORGANIZED HEALTH CARE EDUCATION/TRAINING PROGRAM

## 2023-04-01 PROCEDURE — 82962 GLUCOSE BLOOD TEST: CPT

## 2023-04-01 PROCEDURE — 25010000002 ENOXAPARIN PER 10 MG: Performed by: STUDENT IN AN ORGANIZED HEALTH CARE EDUCATION/TRAINING PROGRAM

## 2023-04-01 PROCEDURE — 99232 SBSQ HOSP IP/OBS MODERATE 35: CPT | Performed by: INTERNAL MEDICINE

## 2023-04-01 PROCEDURE — 25010000002 HYDROMORPHONE PER 4 MG: Performed by: INTERNAL MEDICINE

## 2023-04-01 PROCEDURE — 85025 COMPLETE CBC W/AUTO DIFF WBC: CPT | Performed by: STUDENT IN AN ORGANIZED HEALTH CARE EDUCATION/TRAINING PROGRAM

## 2023-04-01 PROCEDURE — 25010000002 PIPERACILLIN SOD-TAZOBACTAM PER 1 G: Performed by: STUDENT IN AN ORGANIZED HEALTH CARE EDUCATION/TRAINING PROGRAM

## 2023-04-01 PROCEDURE — 25010000002 DIPHENHYDRAMINE PER 50 MG: Performed by: STUDENT IN AN ORGANIZED HEALTH CARE EDUCATION/TRAINING PROGRAM

## 2023-04-01 RX ADMIN — TRAMADOL HYDROCHLORIDE 100 MG: 50 TABLET, COATED ORAL at 16:44

## 2023-04-01 RX ADMIN — SIMETHICONE 80 MG: 80 TABLET, CHEWABLE ORAL at 20:55

## 2023-04-01 RX ADMIN — ENOXAPARIN SODIUM 40 MG: 40 INJECTION SUBCUTANEOUS at 09:24

## 2023-04-01 RX ADMIN — ACETAMINOPHEN 1000 MG: 500 TABLET ORAL at 01:53

## 2023-04-01 RX ADMIN — HYDROMORPHONE HYDROCHLORIDE 0.5 MG: 1 INJECTION, SOLUTION INTRAMUSCULAR; INTRAVENOUS; SUBCUTANEOUS at 22:56

## 2023-04-01 RX ADMIN — TRAMADOL HYDROCHLORIDE 100 MG: 50 TABLET, COATED ORAL at 12:31

## 2023-04-01 RX ADMIN — HYDROMORPHONE HYDROCHLORIDE 0.5 MG: 1 INJECTION, SOLUTION INTRAMUSCULAR; INTRAVENOUS; SUBCUTANEOUS at 15:06

## 2023-04-01 RX ADMIN — SIMETHICONE 80 MG: 80 TABLET, CHEWABLE ORAL at 02:05

## 2023-04-01 RX ADMIN — NAPROXEN 250 MG: 250 TABLET ORAL at 09:25

## 2023-04-01 RX ADMIN — HYDROMORPHONE HYDROCHLORIDE 0.5 MG: 1 INJECTION, SOLUTION INTRAMUSCULAR; INTRAVENOUS; SUBCUTANEOUS at 09:24

## 2023-04-01 RX ADMIN — TAZOBACTAM SODIUM AND PIPERACILLIN SODIUM 3.38 G: 375; 3 INJECTION, SOLUTION INTRAVENOUS at 09:24

## 2023-04-01 RX ADMIN — ACETAMINOPHEN 1000 MG: 500 TABLET ORAL at 06:49

## 2023-04-01 RX ADMIN — LEVOTHYROXINE SODIUM 150 MCG: 150 TABLET ORAL at 09:25

## 2023-04-01 RX ADMIN — DIAZEPAM 5 MG: 5 TABLET ORAL at 06:49

## 2023-04-01 RX ADMIN — DIPHENHYDRAMINE HYDROCHLORIDE 25 MG: 50 INJECTION, SOLUTION INTRAMUSCULAR; INTRAVENOUS at 09:24

## 2023-04-01 RX ADMIN — LOSARTAN POTASSIUM 100 MG: 50 TABLET, FILM COATED ORAL at 09:25

## 2023-04-01 RX ADMIN — ACETAMINOPHEN 1000 MG: 500 TABLET ORAL at 15:06

## 2023-04-01 RX ADMIN — HYDROMORPHONE HYDROCHLORIDE 0.5 MG: 1 INJECTION, SOLUTION INTRAMUSCULAR; INTRAVENOUS; SUBCUTANEOUS at 01:56

## 2023-04-01 RX ADMIN — DIPHENHYDRAMINE HYDROCHLORIDE 25 MG: 50 INJECTION, SOLUTION INTRAMUSCULAR; INTRAVENOUS at 15:06

## 2023-04-01 RX ADMIN — Medication 10 ML: at 09:25

## 2023-04-01 RX ADMIN — DIPHENHYDRAMINE HYDROCHLORIDE 25 MG: 50 INJECTION, SOLUTION INTRAMUSCULAR; INTRAVENOUS at 18:57

## 2023-04-01 RX ADMIN — SIMETHICONE 80 MG: 80 TABLET, CHEWABLE ORAL at 10:26

## 2023-04-01 RX ADMIN — FLUOXETINE 40 MG: 20 CAPSULE ORAL at 09:24

## 2023-04-01 RX ADMIN — CYANOCOBALAMIN TAB 1000 MCG 1000 MCG: 1000 TAB at 09:25

## 2023-04-01 RX ADMIN — AMITRIPTYLINE HYDROCHLORIDE 10 MG: 10 TABLET, FILM COATED ORAL at 20:05

## 2023-04-01 RX ADMIN — DIPHENHYDRAMINE HYDROCHLORIDE 25 MG: 50 INJECTION, SOLUTION INTRAMUSCULAR; INTRAVENOUS at 01:56

## 2023-04-01 RX ADMIN — ACETAMINOPHEN 1000 MG: 500 TABLET ORAL at 20:04

## 2023-04-01 RX ADMIN — TIZANIDINE 4 MG: 4 TABLET ORAL at 20:05

## 2023-04-01 RX ADMIN — Medication 10 ML: at 20:05

## 2023-04-01 RX ADMIN — DIPHENHYDRAMINE HYDROCHLORIDE 25 MG: 50 INJECTION, SOLUTION INTRAMUSCULAR; INTRAVENOUS at 22:56

## 2023-04-01 RX ADMIN — HYDROMORPHONE HYDROCHLORIDE 0.5 MG: 1 INJECTION, SOLUTION INTRAMUSCULAR; INTRAVENOUS; SUBCUTANEOUS at 18:57

## 2023-04-01 RX ADMIN — NAPROXEN 250 MG: 250 TABLET ORAL at 18:38

## 2023-04-01 NOTE — PLAN OF CARE
Problem: Adult Inpatient Plan of Care  Goal: Plan of Care Review  Outcome: Ongoing, Progressing  Goal: Patient-Specific Goal (Individualized)  Outcome: Ongoing, Progressing  Goal: Absence of Hospital-Acquired Illness or Injury  Outcome: Ongoing, Progressing  Intervention: Identify and Manage Fall Risk  Recent Flowsheet Documentation  Taken 4/1/2023 1600 by Willian Aguayo RN  Safety Promotion/Fall Prevention:   activity supervised   assistive device/personal items within reach   clutter free environment maintained   fall prevention program maintained   lighting adjusted   nonskid shoes/slippers when out of bed   room organization consistent   safety round/check completed  Taken 4/1/2023 1400 by Willian Aguayo RN  Safety Promotion/Fall Prevention:   assistive device/personal items within reach   clutter free environment maintained   fall prevention program maintained   lighting adjusted   nonskid shoes/slippers when out of bed   room organization consistent   safety round/check completed  Taken 4/1/2023 1200 by Willian Aguayo RN  Safety Promotion/Fall Prevention:   assistive device/personal items within reach   clutter free environment maintained   fall prevention program maintained   lighting adjusted   nonskid shoes/slippers when out of bed   room organization consistent   safety round/check completed  Taken 4/1/2023 1000 by Willian Aguayo RN  Safety Promotion/Fall Prevention:   activity supervised   assistive device/personal items within reach   clutter free environment maintained   fall prevention program maintained   lighting adjusted   nonskid shoes/slippers when out of bed   room organization consistent   safety round/check completed  Taken 4/1/2023 0800 by Willian Aguayo, RN  Safety Promotion/Fall Prevention:   assistive device/personal items within reach   clutter free environment maintained   fall prevention program maintained   lighting adjusted   nonskid shoes/slippers when out of  bed   room organization consistent   safety round/check completed  Intervention: Prevent Skin Injury  Recent Flowsheet Documentation  Taken 4/1/2023 1600 by Willian Aguayo RN  Body Position: position changed independently  Skin Protection:   adhesive use limited   incontinence pads utilized   transparent dressing maintained   tubing/devices free from skin contact  Taken 4/1/2023 1400 by Willian Aguayo RN  Body Position: position changed independently  Skin Protection:   adhesive use limited   incontinence pads utilized   transparent dressing maintained   tubing/devices free from skin contact  Taken 4/1/2023 1200 by Willian Aguayo RN  Body Position: position changed independently  Skin Protection:   adhesive use limited   incontinence pads utilized   transparent dressing maintained   tubing/devices free from skin contact  Taken 4/1/2023 1000 by Willian Aguayo RN  Body Position: position changed independently  Skin Protection:   adhesive use limited   incontinence pads utilized   transparent dressing maintained   tubing/devices free from skin contact  Taken 4/1/2023 0800 by Willian Aguayo RN  Body Position: position changed independently  Skin Protection:   adhesive use limited   incontinence pads utilized   transparent dressing maintained   tubing/devices free from skin contact  Intervention: Prevent and Manage VTE (Venous Thromboembolism) Risk  Recent Flowsheet Documentation  Taken 4/1/2023 1600 by Willian Aguayo RN  Activity Management:   activity adjusted per tolerance   up ad rosalee  Taken 4/1/2023 1400 by Willian Aguayo RN  Activity Management:   activity adjusted per tolerance   up ad rosalee  Taken 4/1/2023 1200 by Willian Aguayo RN  Activity Management:   activity adjusted per tolerance   up ad rosalee  Taken 4/1/2023 1000 by Willian Aguayo, RN  Activity Management:   activity adjusted per tolerance   up ad rosalee  Taken 4/1/2023 0800 by Willian Aguayo RN  Activity Management:    activity adjusted per tolerance   up ad rosalee   up in chair  VTE Prevention/Management:   bilateral   sequential compression devices off  Range of Motion: active ROM (range of motion) encouraged  Intervention: Prevent Infection  Recent Flowsheet Documentation  Taken 4/1/2023 1600 by Willian Aguayo RN  Infection Prevention:   environmental surveillance performed   rest/sleep promoted  Taken 4/1/2023 1400 by Willian Aguayo RN  Infection Prevention:   environmental surveillance performed   rest/sleep promoted  Taken 4/1/2023 1200 by Willian Aguayo RN  Infection Prevention:   environmental surveillance performed   rest/sleep promoted  Taken 4/1/2023 1000 by Willian Aguayo RN  Infection Prevention:   environmental surveillance performed   rest/sleep promoted  Taken 4/1/2023 0800 by Willian Aguayo RN  Infection Prevention:   environmental surveillance performed   rest/sleep promoted  Goal: Optimal Comfort and Wellbeing  Outcome: Ongoing, Progressing  Intervention: Monitor Pain and Promote Comfort  Recent Flowsheet Documentation  Taken 4/1/2023 1600 by Willian Aguayo RN  Pain Management Interventions: see MAR  Taken 4/1/2023 1400 by Willian Aguayo RN  Pain Management Interventions:   pillow support provided   quiet environment facilitated  Taken 4/1/2023 1200 by Willian Aguayo RN  Pain Management Interventions: see MAR  Taken 4/1/2023 1000 by Willian Aguayo RN  Pain Management Interventions:   pillow support provided   position adjusted   quiet environment facilitated  Intervention: Provide Person-Centered Care  Recent Flowsheet Documentation  Taken 4/1/2023 1600 by Willian Aguayo RN  Trust Relationship/Rapport:   care explained   choices provided  Taken 4/1/2023 1400 by Willian Aguayo RN  Trust Relationship/Rapport:   care explained   choices provided  Taken 4/1/2023 1200 by Willian Aguayo RN  Trust Relationship/Rapport:   care explained   choices provided  Taken 4/1/2023 1000  by Willian Aguayo RN  Trust Relationship/Rapport:   care explained   choices provided  Taken 4/1/2023 0800 by Willian Aguayo RN  Trust Relationship/Rapport:   care explained   choices provided  Goal: Readiness for Transition of Care  Outcome: Ongoing, Progressing     Problem: Pain Acute  Goal: Acceptable Pain Control and Functional Ability  Outcome: Ongoing, Progressing  Intervention: Prevent or Manage Pain  Recent Flowsheet Documentation  Taken 4/1/2023 1600 by Willian Aguayo RN  Medication Review/Management: medications reviewed  Taken 4/1/2023 1400 by Willian Aguayo RN  Medication Review/Management: medications reviewed  Taken 4/1/2023 1200 by Willian Aguayo RN  Medication Review/Management: medications reviewed  Taken 4/1/2023 1000 by Willian Aguayo RN  Medication Review/Management: medications reviewed  Taken 4/1/2023 0800 by Willian Aguayo RN  Medication Review/Management: medications reviewed  Intervention: Develop Pain Management Plan  Recent Flowsheet Documentation  Taken 4/1/2023 1600 by Willian Aguayo RN  Pain Management Interventions: see MAR  Taken 4/1/2023 1400 by Willian Aguayo RN  Pain Management Interventions:   pillow support provided   quiet environment facilitated  Taken 4/1/2023 1200 by Willian Aguayo RN  Pain Management Interventions: see MAR  Taken 4/1/2023 1000 by Willian Aguayo RN  Pain Management Interventions:   pillow support provided   position adjusted   quiet environment facilitated  Intervention: Optimize Psychosocial Wellbeing  Recent Flowsheet Documentation  Taken 4/1/2023 1600 by Willian Aguayo RN  Diversional Activities:   smartphone   television  Taken 4/1/2023 1400 by Willian Aguayo RN  Diversional Activities:   smartphone   television  Taken 4/1/2023 1200 by Willian Aguayo RN  Diversional Activities:   smartphone   television  Taken 4/1/2023 1000 by Willian Aguayo RN  Diversional Activities:   smartphone    television  Taken 4/1/2023 0800 by Willian Aguayo, RN  Diversional Activities: smartphone   Goal Outcome Evaluation:   Plan of care reviewed with: Brynn

## 2023-04-01 NOTE — PROGRESS NOTES
"[facility] ([facility]    2 Days Post-Op   Length of stay: 3 days    Subjective: Patient is doing fair overnight.  Having some pain at her right lower quadrant incision.  Passing flatus, did have a little bit of liquid bowel movements, likely just prep passing through.  Tolerating small amount of diet    Physical Exam:  Blood pressure 109/53, pulse 64, temperature 98.3 °F (36.8 °C), temperature source Oral, resp. rate 16, height 167.6 cm (66\"), weight 92.9 kg (204 lb 12.8 oz), SpO2 95 %.    Abd: Soft, minimally tender in the epigastrium.  Incisions are clean dry and intact.    Labs past 24 hours:  Lab Results (last 24 hours)     Procedure Component Value Units Date/Time    POC Glucose Once [[id number]]  (Normal) Collected: 04/01/23 0546    Specimen: Blood Updated: 04/01/23 0548     Glucose 107 mg/dL      Comment: Meter: [id number] : [id number] Otilio Carrizales       Basic Metabolic Panel [[id number]]  (Abnormal) Collected: 04/01/23 0350    Specimen: Blood Updated: 04/01/23 0547     Glucose 106 mg/dL      BUN 8 mg/dL      Creatinine 0.78 mg/dL      Sodium 138 mmol/L      Potassium 3.8 mmol/L      Chloride 103 mmol/L      CO2 27.0 mmol/L      Calcium 8.2 mg/dL      BUN/Creatinine Ratio 10.3     Anion Gap 8.0 mmol/L      eGFR 92.7 mL/min/1.73     Narrative:      GFR Normal >60  Chronic Kidney Disease <60  Kidney Failure <15      CBC & Differential [[id number]]  (Abnormal) Collected: 04/01/23 0350    Specimen: Blood Updated: 04/01/23 0529    Narrative:      The following orders were created for panel order CBC & Differential.  Procedure                               Abnormality         Status                     ---------                               -----------         ------                     CBC Auto Differential[[id number]]        Abnormal            Final result                 Please view results for these tests on the individual orders.    CBC Auto Differential " [[id number]]  (Abnormal) Collected: 04/01/23 0350    Specimen: Blood Updated: 04/01/23 0529     WBC 6.23 10*3/mm3      RBC 3.76 10*6/mm3      Hemoglobin 11.1 g/dL      Hematocrit 35.1 %      MCV 93.4 fL      MCH 29.5 pg      MCHC 31.6 g/dL      RDW 12.5 %      RDW-SD 43.0 fl      MPV 10.3 fL      Platelets 232 10*3/mm3      Neutrophil % 68.8 %      Lymphocyte % 20.4 %      Monocyte % 8.7 %      Eosinophil % 1.3 %      Basophil % 0.5 %      Immature Grans % 0.3 %      Neutrophils, Absolute 4.29 10*3/mm3      Lymphocytes, Absolute 1.27 10*3/mm3      Monocytes, Absolute 0.54 10*3/mm3      Eosinophils, Absolute 0.08 10*3/mm3      Basophils, Absolute 0.03 10*3/mm3      Immature Grans, Absolute 0.02 10*3/mm3      nRBC 0.0 /100 WBC     POC Glucose Once [[id number]]  (Normal) Collected: 03/31/23 2329    Specimen: Blood Updated: 03/31/23 2336     Glucose 114 mg/dL      Comment: Meter: [id number] : [id number] Otilio Carrizales       POC Glucose Once [[id number]]  (Normal) Collected: 03/31/23 1756    Specimen: Blood Updated: 03/31/23 1758     Glucose 114 mg/dL      Comment: Meter: [id number] : [id number] Rita Dajae       POC Glucose Once [[id number]]  (Normal) Collected: 03/31/23 1212    Specimen: Blood Updated: 03/31/23 1213     Glucose 125 mg/dL      Comment: Meter: [id number] : [id number] Rita Dajae             I/O last shift:  No intake/output data recorded.       Pathology:  Order Name Source Comment Collection Info Order Time   BASIC METABOLIC PANEL   Collected By: Ana Garcia 3/30/2023 10:04 PM   MAGNESIUM   Collected By: Ana Garcia 3/30/2023 10:04 PM     Release to patient   Routine Release        TISSUE PATHOLOGY EXAM Large Intestine, Sigmoid Colon  Collected By: Isidro Stanley MD 3/30/2023  3:42 PM     Release to patient   Routine Release        .    Assessment and Plan:  52-year-old woman with ongoing smoldering diverticulitis now postop day 2 from laparoscopic low anterior  resection    Overall, she continues to improve, still having some issues with pain control and still awaiting full return of bowel function    Plan  Continue multimodal pain control  Incentive spirometry  Vitals every 4  Soft GI diet  Cee out  SCDs and Lovenox  Agree with discontinuing Zosyn today as discussed with infectious disease    Still awaiting full return of bowel function and better pain control, hopefully discharge in the next 24 to 48 hours    Isidro Stanley MD  [facility] ([facility]  04/01/23  09:54 EDT

## 2023-04-01 NOTE — PROGRESS NOTES
[facility] Medicine Services  PROGRESS NOTE    Patient Name: Heidy Whitman  : 1970  MRN: [id number]    Date of Admission: 3/29/2023  Primary Care Physician: Itzel Long MD    Subjective   Subjective     CC:  Recurrent abd pain     HPI:  Some pain, some flatus.  No new issues.  Eating solid food.     ROS:  Gen- No fevers, chills  CV- No chest pain, palpitations  Resp- No cough, dyspnea       Objective   Objective     Vital Signs:   Temp:  [97.8 °F (36.6 °C)-98.6 °F (37 °C)] 98.3 °F (36.8 °C)  Heart Rate:  [63-82] 64  Resp:  [16-20] 16  BP: ()/(51-67) 92/65  Flow (L/min):  [2] 2     Physical Exam:  Constitutional: No acute distress, awake, alert  HENT: NCAT, mucous membranes moist  Respiratory: Clear to auscultation bilaterally, respiratory effort normal room air   Cardiovascular: RRR, no murmurs, rubs, or gallops  Gastrointestinal: Positive bowel sounds, soft, nontender, nondistended, vertical incision irvin  Musculoskeletal: No bilateral ankle edema  Psychiatric: Appropriate affect, cooperative  Neurologic: Oriented x 3, strength symmetric in all extremities, Cranial Nerves grossly intact to confrontation, speech clear  Skin: No rashes   coelho to BSD      Results Reviewed:  LAB RESULTS:      Lab 23  0350 23  0332 23  0346 23  1801   WBC 6.23 8.58 4.70 6.44   HEMOGLOBIN 11.1* 12.2 12.6 13.7   HEMATOCRIT 35.1 36.2 38.8 42.1   PLATELETS 232 252 230 276   NEUTROS ABS 4.29 7.76* 2.77 4.37   IMMATURE GRANS (ABS) 0.02 0.04 0.01 0.02   LYMPHS ABS 1.27 0.43* 1.15 1.30   MONOS ABS 0.54 0.34 0.63 0.63   EOS ABS 0.08 0.00 0.12 0.10   MCV 93.4 88.5 91.7 90.7   PROCALCITONIN  --   --   --  0.18   LACTATE  --   --   --  0.8   PROTIME  --   --  13.2  --          Lab 23  0350 23  0331 23  0346 23  1801   SODIUM 138 136 139 139   POTASSIUM 3.8 3.7 3.6 4.5   CHLORIDE 103 101 103 105   CO2 27.0 24.0 25.0 25.0   ANION GAP 8.0 11.0  11.0 9.0   BUN 8 13 14 19   CREATININE 0.78 0.57 0.79 1.13*   EGFR 92.7 110.9 91.3 59.4*   GLUCOSE 106* 177* 98 108*   CALCIUM 8.2* 8.3* 8.8 8.8   MAGNESIUM  --  1.9  --   --          Lab 03/29/23  1801   TOTAL PROTEIN 6.7   ALBUMIN 4.0   GLOBULIN 2.7   ALT (SGPT) 43*   AST (SGOT) 49*   BILIRUBIN 0.3   ALK PHOS 75         Lab 03/30/23  0346 03/29/23  1801   PROBNP  --  93.4   PROTIME 13.2  --    INR 1.01  --              Lab 03/29/23  2156 03/29/23  1801   ABO TYPING A A   RH TYPING Positive Positive   ANTIBODY SCREEN  --  Negative         Brief Urine Lab Results  (Last result in the past 365 days)      Color   Clarity   Blood   Leuk Est   Nitrite   Protein   CREAT   Urine HCG        02/16/23 1732 Yellow   Clear   Negative   Trace   Negative   Trace                 Microbiology Results Abnormal     None          TAP blocks    Result Date: 3/30/2023  Giancarlo Corea CRNA     3/30/2023  2:07 PM TAP blocks Patient reassessed immediately prior to procedure Patient location during procedure: OR Reason for block: at surgeon's request and post-op pain management Performed by CRNA/CAA: Giancarlo Corea CRNA Preanesthetic Checklist Completed: patient identified, IV checked, site marked, risks and benefits discussed, surgical consent, monitors and equipment checked, pre-op evaluation and timeout performed Prep: Pt Position: supine Sterile barriers:cap, gloves, mask and washed/disinfected hands Prep: ChloraPrep Patient monitoring: blood pressure monitoring, continuous pulse oximetry and EKG Procedure Sedation: yes Performed under: general Guidance:ultrasound guided Images:still images obtained, printed/placed on chart Laterality:Bilateral Block Type:TAP Injection Technique:single-shot Needle Type:short-bevel and echogenic Needle Gauge:20 G Resistance on Injection: none Medications Used: bupivacaine PF (MARCAINE) 0.25 % injection - Injection  60 mL - 3/30/2023 2:05:00 PM dexamethasone sodium phosphate injection - Injection  2 mg  "- 3/30/2023 2:05:00 PM Medications Comment:Block Injection:  LA dose divided between Right and Left block Post Assessment Injection Assessment: negative aspiration for heme, incremental injection and no paresthesia on injection Patient Tolerance:comfortable throughout block Complications:no Additional Notes Mid-Axillary/Lateral TAPs A high-frequency linear transducer, with sterile cover, was placed in the midaxillary line between the ASIS and costal margin. The External Oblique Muscle (EOM), Internal Oblique Muscle (IOM), Transverse Abdominus Muscle (CAGE), and Peritoneum were identified. The insertion site was prepped in sterile fashion and then localized with 2-5 ml of 1% Lidocaine. Using ultrasound-guidance, a 20-gauge B-Johnson 4\" Ultraplex 360 non-stimulating echogenic needle was advanced in plane, from medial to lateral, until the tip of the needle was in the fascial plane between the IOM and CAGE. 1-3ml of preservative free normal saline was used to hydro-dissect the fascial planes. After the fascial plane was verified, the local anesthetic (LA) was injected. The procedure was repeated on the opposite side for bilateral coverage. Aspiration every 5 ml to prevent intravascular injection. Injection was completed with negative aspiration of blood and negative intravascular injection. Injection pressures were normal with minimal resistance. Midaxillary TAPs should reach intercostal nerves T10- T11 and the subcostal nerve T12.        Results for orders placed during the hospital encounter of 02/16/23    Adult Transthoracic Echo Complete w/ Color, Spectral and Contrast if necessary per protocol    Interpretation Summary  •  Left ventricular ejection fraction appears to be 66 - 70%.  •  Estimated right ventricular systolic pressure from tricuspid regurgitation is normal (<35 mmHg).      Current medications:  Scheduled Meds:acetaminophen, 1,000 mg, Oral, Q6H  amitriptyline, 10 mg, Oral, Nightly  enoxaparin, 40 mg, " Subcutaneous, Daily  FLUoxetine, 40 mg, Oral, Daily  losartan, 100 mg, Oral, Q24H   And  hydroCHLOROthiazide, 25 mg, Oral, Q24H  levothyroxine, 150 mcg, Oral, Daily  metoprolol tartrate, 25 mg, Oral, QAM  naproxen, 250 mg, Oral, BID With Meals  piperacillin-tazobactam, 3.375 g, Intravenous, Q8H  sodium chloride, 10 mL, Intravenous, Q12H  tiZANidine, 4 mg, Oral, Nightly  vitamin B-12, 1,000 mcg, Oral, Daily      Continuous Infusions:   PRN Meds:.•  diazePAM  •  diphenhydrAMINE  •  HYDROmorphone  •  [DISCONTINUED] Morphine **AND** naloxone  •  ondansetron  •  simethicone  •  sodium chloride  •  sodium chloride  •  traMADol    Assessment & Plan   Assessment & Plan     Active Hospital Problems    Diagnosis  POA   • **Diverticulitis [K57.92]  Yes   • Elevated serum creatinine [R79.89]  Yes   • HTN (hypertension) [I10]  Yes   • HLD (hyperlipidemia) [E78.5]  Yes   • Hypothyroidism (acquired) [E03.9]  Yes      Resolved Hospital Problems   No resolved problems to display.        Brief Hospital Course to date:  Heidy Whitman is a 52 y.o. female w HTN HL and rheumatoid arthritis.  She had recent diverticulitis and has failed outpt management.    Plan was partially entered by my partner and I have reviewed and updated as appropriate on 3/31/2023     Presumed diverticulitis  - failed outpt therapy who was readmitted with diverticular disease.    - seventh episode  -- ID and CRS following  - 3/30:  Lap LAR with ureteral stent insertion and sigmoid/prox rectum resection      Norovirus  -- ID following  -- cdiff neg     Elevated creatinine -- Creatinine 1.13 improved to 0.57    Hypertension  Hyperlipidemia  -- Losartan, metoprolol     Elevated LFTs, chronic  -- stable     RA  -- On amitriptyline  -- Gets Enbrel and Praluent injections  -- Enbrel on hold will need to be off therapy until recovered from surgery   -- Follows with Dr. Adarsh Patton     Hypothyroidism  -- Continue Synthroid       Expected Discharge Location  and Transportation: home   Expected Discharge   Expected Discharge Date and Time     Expected Discharge Date Expected Discharge Time    Apr 2, 2023            DVT prophylaxis:  Medical and mechanical DVT prophylaxis orders are present.     AM-PAC 6 Clicks Score (PT): 19 (03/31/23 2000)    CODE STATUS:   Code Status and Medical Interventions:   Ordered at: 03/29/23 2036     Level Of Support Discussed With:    Patient     Code Status (Patient has no pulse and is not breathing):    CPR (Attempt to Resuscitate)     Medical Interventions (Patient has pulse or is breathing):    Full Support       Guerline Taylor MD  04/01/23

## 2023-04-01 NOTE — PLAN OF CARE
Goal Outcome Evaluation:  Plan of Care Reviewed With: patient       VSS, BP a bit low but MAP is WDL. 2 L n/c. Slept well. Gave PRNs. Better pain control. No nausea. Good output from coelho. Had a BM at start of shift, red streaked but brown in color. Using incentive spirometer and splint pillow. Removed coelho in AM.         Progress: improving     Problem: Adult Inpatient Plan of Care  Goal: Plan of Care Review  Outcome: Ongoing, Progressing  Flowsheets (Taken 4/1/2023 0432)  Progress: improving  Plan of Care Reviewed With: patient  Goal: Patient-Specific Goal (Individualized)  Outcome: Ongoing, Progressing  Goal: Absence of Hospital-Acquired Illness or Injury  Outcome: Ongoing, Progressing  Intervention: Identify and Manage Fall Risk  Recent Flowsheet Documentation  Taken 4/1/2023 0400 by Jesi Daily RN  Safety Promotion/Fall Prevention:  • activity supervised  • assistive device/personal items within reach  • clutter free environment maintained  Taken 4/1/2023 0200 by Jesi Daily RN  Safety Promotion/Fall Prevention:  • activity supervised  • assistive device/personal items within reach  Taken 4/1/2023 0000 by Jesi Daily RN  Safety Promotion/Fall Prevention:  • activity supervised  • assistive device/personal items within reach  Taken 3/31/2023 2200 by Jesi Daily RN  Safety Promotion/Fall Prevention:  • activity supervised  • assistive device/personal items within reach  Taken 3/31/2023 2000 by Jesi Daily RN  Safety Promotion/Fall Prevention:  • activity supervised  • assistive device/personal items within reach  Intervention: Prevent Skin Injury  Recent Flowsheet Documentation  Taken 4/1/2023 0400 by Jesi Daily RN  Body Position: weight shifting  Skin Protection:  • adhesive use limited  • skin-to-device areas padded  • skin-to-skin areas padded  • tubing/devices free from skin contact  Taken 4/1/2023 0200 by Jesi Daily RN  Body Position:  • weight shifting  •  position changed independently  Skin Protection: adhesive use limited  Taken 4/1/2023 0000 by Jesi Daily RN  Body Position: position changed independently  Skin Protection: adhesive use limited  Taken 3/31/2023 2200 by Jesi Daily RN  Body Position: position changed independently  Skin Protection: adhesive use limited  Taken 3/31/2023 2000 by Jesi Daily RN  Body Position: position changed independently  Skin Protection: adhesive use limited  Intervention: Prevent and Manage VTE (Venous Thromboembolism) Risk  Recent Flowsheet Documentation  Taken 4/1/2023 0400 by Jesi Daily RN  Activity Management:  • activity adjusted per tolerance  • activity encouraged  Taken 4/1/2023 0200 by Jesi Daily RN  Activity Management:  • activity adjusted per tolerance  • activity encouraged  Taken 4/1/2023 0000 by Jesi Daily RN  Activity Management:  • activity adjusted per tolerance  • activity encouraged  Taken 3/31/2023 2200 by Jesi Daily RN  Activity Management:  • activity adjusted per tolerance  • activity encouraged  VTE Prevention/Management:  • bilateral  • sequential compression devices on  Taken 3/31/2023 2000 by Jesi Daily RN  Activity Management:  • activity adjusted per tolerance  • activity encouraged  Goal: Optimal Comfort and Wellbeing  Outcome: Ongoing, Progressing  Intervention: Provide Person-Centered Care  Recent Flowsheet Documentation  Taken 3/31/2023 2000 by Jesi Daily RN  Trust Relationship/Rapport: care explained  Goal: Readiness for Transition of Care  Outcome: Ongoing, Progressing
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
## 2023-04-02 LAB
ANION GAP SERPL CALCULATED.3IONS-SCNC: 10 MMOL/L (ref 5–15)
BASOPHILS # BLD AUTO: 0.03 10*3/MM3 (ref 0–0.2)
BASOPHILS NFR BLD AUTO: 0.5 % (ref 0–1.5)
BUN SERPL-MCNC: 12 MG/DL (ref 6–20)
BUN/CREAT SERPL: 17.4 (ref 7–25)
CALCIUM SPEC-SCNC: 8.8 MG/DL (ref 8.6–10.5)
CHLORIDE SERPL-SCNC: 106 MMOL/L (ref 98–107)
CO2 SERPL-SCNC: 28 MMOL/L (ref 22–29)
CREAT SERPL-MCNC: 0.69 MG/DL (ref 0.57–1)
DEPRECATED RDW RBC AUTO: 43.4 FL (ref 37–54)
EGFRCR SERPLBLD CKD-EPI 2021: 105.9 ML/MIN/1.73
EOSINOPHIL # BLD AUTO: 0.27 10*3/MM3 (ref 0–0.4)
EOSINOPHIL NFR BLD AUTO: 4.9 % (ref 0.3–6.2)
ERYTHROCYTE [DISTWIDTH] IN BLOOD BY AUTOMATED COUNT: 12.3 % (ref 12.3–15.4)
GLUCOSE BLDC GLUCOMTR-MCNC: 76 MG/DL (ref 70–130)
GLUCOSE BLDC GLUCOMTR-MCNC: 79 MG/DL (ref 70–130)
GLUCOSE BLDC GLUCOMTR-MCNC: 82 MG/DL (ref 70–130)
GLUCOSE SERPL-MCNC: 73 MG/DL (ref 65–99)
HCT VFR BLD AUTO: 37.6 % (ref 34–46.6)
HGB BLD-MCNC: 11.9 G/DL (ref 12–15.9)
IMM GRANULOCYTES # BLD AUTO: 0.03 10*3/MM3 (ref 0–0.05)
IMM GRANULOCYTES NFR BLD AUTO: 0.5 % (ref 0–0.5)
LYMPHOCYTES # BLD AUTO: 1.64 10*3/MM3 (ref 0.7–3.1)
LYMPHOCYTES NFR BLD AUTO: 29.8 % (ref 19.6–45.3)
MCH RBC QN AUTO: 29.8 PG (ref 26.6–33)
MCHC RBC AUTO-ENTMCNC: 31.6 G/DL (ref 31.5–35.7)
MCV RBC AUTO: 94.2 FL (ref 79–97)
MONOCYTES # BLD AUTO: 0.66 10*3/MM3 (ref 0.1–0.9)
MONOCYTES NFR BLD AUTO: 12 % (ref 5–12)
NEUTROPHILS NFR BLD AUTO: 2.87 10*3/MM3 (ref 1.7–7)
NEUTROPHILS NFR BLD AUTO: 52.3 % (ref 42.7–76)
NRBC BLD AUTO-RTO: 0 /100 WBC (ref 0–0.2)
PLATELET # BLD AUTO: 240 10*3/MM3 (ref 140–450)
PMV BLD AUTO: 10.3 FL (ref 6–12)
POTASSIUM SERPL-SCNC: 3.9 MMOL/L (ref 3.5–5.2)
RBC # BLD AUTO: 3.99 10*6/MM3 (ref 3.77–5.28)
SODIUM SERPL-SCNC: 144 MMOL/L (ref 136–145)
WBC NRBC COR # BLD: 5.5 10*3/MM3 (ref 3.4–10.8)

## 2023-04-02 PROCEDURE — 25010000002 HYDROMORPHONE PER 4 MG: Performed by: INTERNAL MEDICINE

## 2023-04-02 PROCEDURE — 99232 SBSQ HOSP IP/OBS MODERATE 35: CPT | Performed by: PHYSICIAN ASSISTANT

## 2023-04-02 PROCEDURE — 80048 BASIC METABOLIC PNL TOTAL CA: CPT | Performed by: STUDENT IN AN ORGANIZED HEALTH CARE EDUCATION/TRAINING PROGRAM

## 2023-04-02 PROCEDURE — 85025 COMPLETE CBC W/AUTO DIFF WBC: CPT | Performed by: STUDENT IN AN ORGANIZED HEALTH CARE EDUCATION/TRAINING PROGRAM

## 2023-04-02 PROCEDURE — 25010000002 DIPHENHYDRAMINE PER 50 MG: Performed by: STUDENT IN AN ORGANIZED HEALTH CARE EDUCATION/TRAINING PROGRAM

## 2023-04-02 PROCEDURE — 82962 GLUCOSE BLOOD TEST: CPT

## 2023-04-02 PROCEDURE — 25010000002 ENOXAPARIN PER 10 MG: Performed by: STUDENT IN AN ORGANIZED HEALTH CARE EDUCATION/TRAINING PROGRAM

## 2023-04-02 RX ORDER — HYDROMORPHONE HYDROCHLORIDE 1 MG/ML
0.5 INJECTION, SOLUTION INTRAMUSCULAR; INTRAVENOUS; SUBCUTANEOUS EVERY 4 HOURS PRN
Status: DISCONTINUED | OUTPATIENT
Start: 2023-04-02 | End: 2023-04-03 | Stop reason: HOSPADM

## 2023-04-02 RX ORDER — HYDROMORPHONE HYDROCHLORIDE 2 MG/1
2 TABLET ORAL EVERY 4 HOURS PRN
Status: DISCONTINUED | OUTPATIENT
Start: 2023-04-02 | End: 2023-04-03 | Stop reason: HOSPADM

## 2023-04-02 RX ORDER — TRAMADOL HYDROCHLORIDE 50 MG/1
50 TABLET ORAL ONCE
Status: COMPLETED | OUTPATIENT
Start: 2023-04-02 | End: 2023-04-02

## 2023-04-02 RX ORDER — ACETAMINOPHEN 500 MG
1000 TABLET ORAL EVERY 8 HOURS
Status: DISCONTINUED | OUTPATIENT
Start: 2023-04-03 | End: 2023-04-03 | Stop reason: HOSPADM

## 2023-04-02 RX ORDER — TRAMADOL HYDROCHLORIDE 50 MG/1
100 TABLET ORAL EVERY 6 HOURS
Status: DISCONTINUED | OUTPATIENT
Start: 2023-04-02 | End: 2023-04-03 | Stop reason: HOSPADM

## 2023-04-02 RX ADMIN — METOPROLOL TARTRATE 25 MG: 25 TABLET, FILM COATED ORAL at 06:11

## 2023-04-02 RX ADMIN — ENOXAPARIN SODIUM 40 MG: 40 INJECTION SUBCUTANEOUS at 08:36

## 2023-04-02 RX ADMIN — TRAMADOL HYDROCHLORIDE 50 MG: 50 TABLET, FILM COATED ORAL at 08:36

## 2023-04-02 RX ADMIN — DIPHENHYDRAMINE HYDROCHLORIDE 25 MG: 50 INJECTION, SOLUTION INTRAMUSCULAR; INTRAVENOUS at 04:28

## 2023-04-02 RX ADMIN — LEVOTHYROXINE SODIUM 150 MCG: 150 TABLET ORAL at 08:36

## 2023-04-02 RX ADMIN — HYDROMORPHONE HYDROCHLORIDE 0.5 MG: 1 INJECTION, SOLUTION INTRAMUSCULAR; INTRAVENOUS; SUBCUTANEOUS at 04:29

## 2023-04-02 RX ADMIN — NAPROXEN 250 MG: 250 TABLET ORAL at 08:36

## 2023-04-02 RX ADMIN — SIMETHICONE 80 MG: 80 TABLET, CHEWABLE ORAL at 06:11

## 2023-04-02 RX ADMIN — SIMETHICONE 80 MG: 80 TABLET, CHEWABLE ORAL at 20:23

## 2023-04-02 RX ADMIN — Medication 10 ML: at 20:16

## 2023-04-02 RX ADMIN — AMITRIPTYLINE HYDROCHLORIDE 10 MG: 10 TABLET, FILM COATED ORAL at 20:16

## 2023-04-02 RX ADMIN — DIPHENHYDRAMINE HYDROCHLORIDE 25 MG: 50 INJECTION, SOLUTION INTRAMUSCULAR; INTRAVENOUS at 20:30

## 2023-04-02 RX ADMIN — HYDROMORPHONE HYDROCHLORIDE 2 MG: 2 TABLET ORAL at 14:42

## 2023-04-02 RX ADMIN — DIPHENHYDRAMINE HYDROCHLORIDE 25 MG: 50 INJECTION, SOLUTION INTRAMUSCULAR; INTRAVENOUS at 08:24

## 2023-04-02 RX ADMIN — TRAMADOL HYDROCHLORIDE 100 MG: 50 TABLET, COATED ORAL at 17:30

## 2023-04-02 RX ADMIN — CYANOCOBALAMIN TAB 1000 MCG 1000 MCG: 1000 TAB at 08:36

## 2023-04-02 RX ADMIN — DIAZEPAM 5 MG: 5 TABLET ORAL at 11:14

## 2023-04-02 RX ADMIN — ACETAMINOPHEN 1000 MG: 500 TABLET ORAL at 20:15

## 2023-04-02 RX ADMIN — HYDROMORPHONE HYDROCHLORIDE 0.5 MG: 1 INJECTION, SOLUTION INTRAMUSCULAR; INTRAVENOUS; SUBCUTANEOUS at 08:24

## 2023-04-02 RX ADMIN — Medication 10 ML: at 08:36

## 2023-04-02 RX ADMIN — NAPROXEN 250 MG: 250 TABLET ORAL at 17:31

## 2023-04-02 RX ADMIN — ACETAMINOPHEN 1000 MG: 500 TABLET ORAL at 14:42

## 2023-04-02 RX ADMIN — FLUOXETINE 40 MG: 20 CAPSULE ORAL at 08:36

## 2023-04-02 RX ADMIN — DIPHENHYDRAMINE HYDROCHLORIDE 25 MG: 50 INJECTION, SOLUTION INTRAMUSCULAR; INTRAVENOUS at 14:42

## 2023-04-02 RX ADMIN — TRAMADOL HYDROCHLORIDE 100 MG: 50 TABLET, COATED ORAL at 11:14

## 2023-04-02 RX ADMIN — TIZANIDINE 4 MG: 4 TABLET ORAL at 20:16

## 2023-04-02 RX ADMIN — HYDROMORPHONE HYDROCHLORIDE 2 MG: 2 TABLET ORAL at 20:23

## 2023-04-02 RX ADMIN — LOSARTAN POTASSIUM 100 MG: 50 TABLET, FILM COATED ORAL at 08:36

## 2023-04-02 RX ADMIN — ACETAMINOPHEN 1000 MG: 500 TABLET ORAL at 08:36

## 2023-04-02 NOTE — PROGRESS NOTES
[facility] Medicine Services  PROGRESS NOTE    Patient Name: Heidy Whitman  : 1970  MRN: [id number]    Date of Admission: 3/29/2023  Primary Care Physician: Itzel Long MD    Subjective     CC: f/u recurrent diverticulitis s/p LAR    HPI:    Sitting in chair. In quite a bit of pain. Said she got washed up and did a lot in her room this morning, now quite sore. No chest pain or dyspnea. Tolerating diet without nausea or vomiting. Passing gas but no BM.     ROS:  Gen- No fevers, chills  CV- No chest pain, palpitations  Resp- No cough, dyspnea  GI- as above     Objective     Vital Signs:   Temp:  [98.4 °F (36.9 °C)-98.8 °F (37.1 °C)] 98.7 °F (37.1 °C)  Heart Rate:  [57-77] 64  Resp:  [18-20] 20  BP: (105-129)/(53-77) 112/59     Physical Exam:  Constitutional: Awake, alert and conversational. Sitting up in chair. Appears uncomfortable   HENT: NCAT, mucous membranes moist  Respiratory: Clear to auscultation bilaterally, respiratory effort normal on room air   Cardiovascular: RRR, no murmurs, rubs, or gallops  Gastrointestinal: Positive bowel sounds, soft. Incision x 3 c/d/i without erythema, edema or induration   Musculoskeletal: No bilateral ankle edema  Psychiatric: Appropriate affect, cooperative  Neurologic: Oriented x 3, moves all extremities spontaneously without focal deficits, speech clear  Skin: No rashes    Results Reviewed:  LAB RESULTS:      Lab 23  0338 23  0350 23  0332 23  0346 23  1801   WBC 5.50 6.23 8.58 4.70 6.44   HEMOGLOBIN 11.9* 11.1* 12.2 12.6 13.7   HEMATOCRIT 37.6 35.1 36.2 38.8 42.1   PLATELETS 240 232 252 230 276   NEUTROS ABS 2.87 4.29 7.76* 2.77 4.37   IMMATURE GRANS (ABS) 0.03 0.02 0.04 0.01 0.02   LYMPHS ABS 1.64 1.27 0.43* 1.15 1.30   MONOS ABS 0.66 0.54 0.34 0.63 0.63   EOS ABS 0.27 0.08 0.00 0.12 0.10   MCV 94.2 93.4 88.5 91.7 90.7   PROCALCITONIN  --   --   --   --  0.18   LACTATE  --   --   --   --  0.8    PROTIME  --   --   --  13.2  --          Lab 04/02/23  0337 04/01/23  0350 03/31/23  0331 03/30/23  0346 03/29/23  1801   SODIUM 144 138 136 139 139   POTASSIUM 3.9 3.8 3.7 3.6 4.5   CHLORIDE 106 103 101 103 105   CO2 28.0 27.0 24.0 25.0 25.0   ANION GAP 10.0 8.0 11.0 11.0 9.0   BUN 12 8 13 14 19   CREATININE 0.69 0.78 0.57 0.79 1.13*   EGFR 105.9 92.7 110.9 91.3 59.4*   GLUCOSE 73 106* 177* 98 108*   CALCIUM 8.8 8.2* 8.3* 8.8 8.8   MAGNESIUM  --   --  1.9  --   --          Lab 03/29/23  1801   TOTAL PROTEIN 6.7   ALBUMIN 4.0   GLOBULIN 2.7   ALT (SGPT) 43*   AST (SGOT) 49*   BILIRUBIN 0.3   ALK PHOS 75     Brief Urine Lab Results  (Last result in the past 365 days)      Color   Clarity   Blood   Leuk Est   Nitrite   Protein   CREAT   Urine HCG        02/16/23 1732 Yellow   Clear   Negative   Trace   Negative   Trace               Microbiology Results Abnormal     None        No radiology results from the last 24 hrs    Results for orders placed during the hospital encounter of 02/16/23    Adult Transthoracic Echo Complete w/ Color, Spectral and Contrast if necessary per protocol    Interpretation Summary  •  Left ventricular ejection fraction appears to be 66 - 70%.  •  Estimated right ventricular systolic pressure from tricuspid regurgitation is normal (<35 mmHg).    Current medications:  Scheduled Meds:acetaminophen, 1,000 mg, Oral, Q6H  [START ON 4/3/2023] acetaminophen, 1,000 mg, Oral, Q8H  amitriptyline, 10 mg, Oral, Nightly  enoxaparin, 40 mg, Subcutaneous, Daily  FLUoxetine, 40 mg, Oral, Daily  levothyroxine, 150 mcg, Oral, Daily  losartan, 100 mg, Oral, Q24H  metoprolol tartrate, 25 mg, Oral, QAM  naproxen, 250 mg, Oral, BID With Meals  sodium chloride, 10 mL, Intravenous, Q12H  tiZANidine, 4 mg, Oral, Nightly  traMADol, 100 mg, Oral, Q6H  traMADol, 50 mg, Oral, Once  vitamin B-12, 1,000 mcg, Oral, Daily      Continuous Infusions:   PRN Meds:.•  diazePAM  •  diphenhydrAMINE  •  HYDROmorphone **OR**  HYDROmorphone  •  [DISCONTINUED] Morphine **AND** naloxone  •  ondansetron  •  simethicone  •  sodium chloride  •  sodium chloride    Assessment & Plan     Active Hospital Problems    Diagnosis  POA   • **Diverticulitis [K57.92]  Yes   • Elevated serum creatinine [R79.89]  Yes   • HTN (hypertension) [I10]  Yes   • HLD (hyperlipidemia) [E78.5]  Yes   • Hypothyroidism (acquired) [E03.9]  Yes      Resolved Hospital Problems   No resolved problems to display.     Brief Hospital Course to date:  Heidy Whitman is a 52 y.o. female with PMH significant for HTN, HLD, hypothyroidism, RA and recurrent diverticulitis. Recently admitted to [facility] 2/16-2/21/2023 for diverticulitis with failure of outpatient treatment. Colorectal surgery and ID followed. She discharged on IV Invanz with plans for outpatient elective colectomy (scheduled April 2023). She was sent to to [facility] ED from Dr. Stanley's office (colorectal surgery) on 3/29/2023 for evaluation of abdominal pain and diarrhea x 10 days. She had tested positive for Norovirus 5 days prior.     Recurrent diverticulitis  - s/p laparoscopic LAR with ureteral stent insertion and sigmoid / proximal rectum resection by Jamilah Stanley and Tatianna on 3/30/2023  - Infectious disease following - monitoring off of antimicrobial therapy  - Patient reports allergy/intolerance to morphine, codeine, dilaudid, oxycodone, hydrocodone etc - this has made postoperative pain control challenging   - Schedule Tramadol 100mg Q6H, continue PRN Dilaudid Q4H but add PO option in an attempt to wean off of IV medications  - Continue scheduled Naproxen, continue PRN Valium     Diarrhea, resolved   (+) norovirus  - C. Diff negative  - Conservative management  - Now no BM since surgery on 3/30     Elevated creatinine   - Baseline creatinine appears to be 0.6-0.9  - Creatinine 1.13 on admission - s/p IV fluids with resolution  - Creatining 0.69 today      Hypertension  Hyperlipidemia  - Continue Losartan 100mg daily and Metoprolol 25mg daily      Elevated LFTs, chronic  - Stable     RA  - Continue home Amitriptyline  - Gets Enbrel and Praluent injections - on hold until recovered from surgery   - Follows with Dr. Adarsh Patton     Hypothyroidism  - Continue Synthroid     Expected Discharge Location and Transportation: home   Expected Discharge   Expected Discharge Date and Time     Expected Discharge Date Expected Discharge Time    Apr 3, 2023          DVT prophylaxis:Medical and mechanical DVT prophylaxis orders are present.     AM-PAC 6 Clicks Score (PT): 19 (04/01/23 2004)    CODE STATUS:   Code Status and Medical Interventions:   Ordered at: 03/29/23 2036     Level Of Support Discussed With:    Patient     Code Status (Patient has no pulse and is not breathing):    CPR (Attempt to Resuscitate)     Medical Interventions (Patient has pulse or is breathing):    Full Support     Phoebe Cardoso PA-C  04/02/23

## 2023-04-02 NOTE — PROGRESS NOTES
"[facility]    3 Days Post-Op   Length of stay: 4 days    Subjective: Patient is doing fair today.  Pain is slowly improving.  She is continuing to pass flatus, did have passage of some bloody mucus this morning.  Tolerating a diet    Physical Exam:  Blood pressure 112/59, pulse 65, temperature 98.6 °F (37 °C), temperature source Axillary, resp. rate 20, height 167.6 cm (66\"), weight 92.9 kg (204 lb 12.8 oz), SpO2 99 %.    Abd: Soft, appropriately tender, only minimally distended in the epigastrium, there is a tensive erythema at the top side of her umbilical incision, no signs of any fluctuation or induration here though.    Labs past 24 hours:  Lab Results (last 24 hours)     Procedure Component Value Units Date/Time    POC Glucose Once [[id number]]  (Normal) Collected: 04/02/23 0532    Specimen: Blood Updated: 04/02/23 0533     Glucose 79 mg/dL      Comment: Meter: [id number] : [id number] Jennifer Jean       Basic Metabolic Panel [[id number]]  (Normal) Collected: 04/02/23 0337    Specimen: Blood Updated: 04/02/23 0518     Glucose 73 mg/dL      BUN 12 mg/dL      Creatinine 0.69 mg/dL      Sodium 144 mmol/L      Potassium 3.9 mmol/L      Chloride 106 mmol/L      CO2 28.0 mmol/L      Calcium 8.8 mg/dL      BUN/Creatinine Ratio 17.4     Anion Gap 10.0 mmol/L      eGFR 105.9 mL/min/1.73     Narrative:      GFR Normal >60  Chronic Kidney Disease <60  Kidney Failure <15      CBC & Differential [[id number]]  (Abnormal) Collected: 04/02/23 0338    Specimen: Blood Updated: 04/02/23 0449    Narrative:      The following orders were created for panel order CBC & Differential.  Procedure                               Abnormality         Status                     ---------                               -----------         ------                     CBC Auto Differential[[id number]]        Abnormal            Final result                 Please view results for these tests on the " individual orders.    CBC Auto Differential [[id number]]  (Abnormal) Collected: 04/02/23 0338    Specimen: Blood Updated: 04/02/23 0449     WBC 5.50 10*3/mm3      RBC 3.99 10*6/mm3      Hemoglobin 11.9 g/dL      Hematocrit 37.6 %      MCV 94.2 fL      MCH 29.8 pg      MCHC 31.6 g/dL      RDW 12.3 %      RDW-SD 43.4 fl      MPV 10.3 fL      Platelets 240 10*3/mm3      Neutrophil % 52.3 %      Lymphocyte % 29.8 %      Monocyte % 12.0 %      Eosinophil % 4.9 %      Basophil % 0.5 %      Immature Grans % 0.5 %      Neutrophils, Absolute 2.87 10*3/mm3      Lymphocytes, Absolute 1.64 10*3/mm3      Monocytes, Absolute 0.66 10*3/mm3      Eosinophils, Absolute 0.27 10*3/mm3      Basophils, Absolute 0.03 10*3/mm3      Immature Grans, Absolute 0.03 10*3/mm3      nRBC 0.0 /100 WBC     POC Glucose Once [[id number]]  (Normal) Collected: 04/01/23 2338    Specimen: Blood Updated: 04/01/23 2340     Glucose 93 mg/dL      Comment: Meter: [id number] : [id number] Jennifer Jean       POC Glucose Once [[id number]]  (Normal) Collected: 04/01/23 1809    Specimen: Blood Updated: 04/01/23 1810     Glucose 86 mg/dL      Comment: Meter: [id number] : [id number] Mary Young             I/O last shift:  No intake/output data recorded.       Pathology:  Order Name Source Comment Collection Info Order Time   BASIC METABOLIC PANEL   Collected By: Ana Garcia 3/30/2023 10:04 PM   MAGNESIUM   Collected By: Ana Garcia 3/30/2023 10:04 PM     Release to patient   Routine Release        TISSUE PATHOLOGY EXAM Large Intestine, Sigmoid Colon  Collected By: Isidro Stanley MD 3/30/2023  3:42 PM     Release to patient   Routine Release        .    Assessment and Plan:  52-year-old woman who presented with ongoing smoldering diverticulitis with failure in the outpatient setting now postop day 3 from laparoscopic low anterior resection    Overall, she continues to improve a, she is requiring less IV pain medication.  Still awaiting full  return of bowel function    Plan  Continue multimodal pain control  Incentive spirometry  Vitals every 4  Continue soft GI diet  Cee and fluids out and off  Continue SCDs, I will hold on Lovenox due to her passage of blood per rectum  Continue to monitor the superior aspect of her umbilical incision, it is a bit erythematous here but no overt signs of infection    Hopefully home in the next 24 hours    Isidro Stanley MD  [facility]  04/02/23  11:34 EDT

## 2023-04-03 ENCOUNTER — READMISSION MANAGEMENT (OUTPATIENT)
Dept: CALL CENTER | Facility: HOSPITAL | Age: 53
End: 2023-04-03
Payer: COMMERCIAL

## 2023-04-03 VITALS
RESPIRATION RATE: 18 BRPM | HEIGHT: 66 IN | OXYGEN SATURATION: 96 % | SYSTOLIC BLOOD PRESSURE: 134 MMHG | BODY MASS INDEX: 32.92 KG/M2 | HEART RATE: 65 BPM | DIASTOLIC BLOOD PRESSURE: 95 MMHG | TEMPERATURE: 98.5 F | WEIGHT: 204.8 LBS

## 2023-04-03 PROBLEM — E86.1 HYPOVOLEMIA DUE TO DEHYDRATION: Status: ACTIVE | Noted: 2023-04-03

## 2023-04-03 PROBLEM — E86.1 HYPOVOLEMIA DUE TO DEHYDRATION: Status: RESOLVED | Noted: 2023-04-03 | Resolved: 2023-04-03

## 2023-04-03 PROBLEM — A08.11 NOROVIRUS: Status: ACTIVE | Noted: 2023-04-03

## 2023-04-03 PROBLEM — E86.0 HYPOVOLEMIA DUE TO DEHYDRATION: Status: RESOLVED | Noted: 2023-04-03 | Resolved: 2023-04-03

## 2023-04-03 PROBLEM — R79.89 ELEVATED SERUM CREATININE: Status: RESOLVED | Noted: 2023-03-29 | Resolved: 2023-04-03

## 2023-04-03 PROBLEM — R19.7 DIARRHEA OF PRESUMED INFECTIOUS ORIGIN: Status: ACTIVE | Noted: 2023-04-03

## 2023-04-03 PROBLEM — A08.11 NOROVIRUS: Status: RESOLVED | Noted: 2023-04-03 | Resolved: 2023-04-03

## 2023-04-03 PROBLEM — E86.0 HYPOVOLEMIA DUE TO DEHYDRATION: Status: ACTIVE | Noted: 2023-04-03

## 2023-04-03 LAB
ANION GAP SERPL CALCULATED.3IONS-SCNC: 10 MMOL/L (ref 5–15)
BASOPHILS # BLD AUTO: 0.04 10*3/MM3 (ref 0–0.2)
BASOPHILS NFR BLD AUTO: 0.8 % (ref 0–1.5)
BUN SERPL-MCNC: 12 MG/DL (ref 6–20)
BUN/CREAT SERPL: 20.3 (ref 7–25)
CALCIUM SPEC-SCNC: 8.2 MG/DL (ref 8.6–10.5)
CHLORIDE SERPL-SCNC: 106 MMOL/L (ref 98–107)
CO2 SERPL-SCNC: 27 MMOL/L (ref 22–29)
CREAT SERPL-MCNC: 0.59 MG/DL (ref 0.57–1)
CYTO UR: NORMAL
DEPRECATED RDW RBC AUTO: 41.9 FL (ref 37–54)
EGFRCR SERPLBLD CKD-EPI 2021: 110 ML/MIN/1.73
EOSINOPHIL # BLD AUTO: 0.25 10*3/MM3 (ref 0–0.4)
EOSINOPHIL NFR BLD AUTO: 5.2 % (ref 0.3–6.2)
ERYTHROCYTE [DISTWIDTH] IN BLOOD BY AUTOMATED COUNT: 12.5 % (ref 12.3–15.4)
GLUCOSE BLDC GLUCOMTR-MCNC: 84 MG/DL (ref 70–130)
GLUCOSE SERPL-MCNC: 86 MG/DL (ref 65–99)
HCT VFR BLD AUTO: 36.2 % (ref 34–46.6)
HGB BLD-MCNC: 11.5 G/DL (ref 12–15.9)
IMM GRANULOCYTES # BLD AUTO: 0.01 10*3/MM3 (ref 0–0.05)
IMM GRANULOCYTES NFR BLD AUTO: 0.2 % (ref 0–0.5)
LAB AP CASE REPORT: NORMAL
LAB AP CLINICAL INFORMATION: NORMAL
LYMPHOCYTES # BLD AUTO: 1.15 10*3/MM3 (ref 0.7–3.1)
LYMPHOCYTES NFR BLD AUTO: 23.7 % (ref 19.6–45.3)
MCH RBC QN AUTO: 29.4 PG (ref 26.6–33)
MCHC RBC AUTO-ENTMCNC: 31.8 G/DL (ref 31.5–35.7)
MCV RBC AUTO: 92.6 FL (ref 79–97)
MONOCYTES # BLD AUTO: 0.54 10*3/MM3 (ref 0.1–0.9)
MONOCYTES NFR BLD AUTO: 11.1 % (ref 5–12)
NEUTROPHILS NFR BLD AUTO: 2.86 10*3/MM3 (ref 1.7–7)
NEUTROPHILS NFR BLD AUTO: 59 % (ref 42.7–76)
NRBC BLD AUTO-RTO: 0 /100 WBC (ref 0–0.2)
PATH REPORT.FINAL DX SPEC: NORMAL
PATH REPORT.GROSS SPEC: NORMAL
PLATELET # BLD AUTO: 232 10*3/MM3 (ref 140–450)
PMV BLD AUTO: 10.5 FL (ref 6–12)
POTASSIUM SERPL-SCNC: 3.8 MMOL/L (ref 3.5–5.2)
RBC # BLD AUTO: 3.91 10*6/MM3 (ref 3.77–5.28)
SODIUM SERPL-SCNC: 143 MMOL/L (ref 136–145)
WBC NRBC COR # BLD: 4.85 10*3/MM3 (ref 3.4–10.8)

## 2023-04-03 PROCEDURE — 99239 HOSP IP/OBS DSCHRG MGMT >30: CPT | Performed by: PHYSICIAN ASSISTANT

## 2023-04-03 PROCEDURE — 85025 COMPLETE CBC W/AUTO DIFF WBC: CPT | Performed by: STUDENT IN AN ORGANIZED HEALTH CARE EDUCATION/TRAINING PROGRAM

## 2023-04-03 PROCEDURE — 25010000002 DIPHENHYDRAMINE PER 50 MG: Performed by: STUDENT IN AN ORGANIZED HEALTH CARE EDUCATION/TRAINING PROGRAM

## 2023-04-03 PROCEDURE — 82962 GLUCOSE BLOOD TEST: CPT

## 2023-04-03 PROCEDURE — 80048 BASIC METABOLIC PNL TOTAL CA: CPT | Performed by: STUDENT IN AN ORGANIZED HEALTH CARE EDUCATION/TRAINING PROGRAM

## 2023-04-03 RX ORDER — CEFUROXIME AXETIL 250 MG/1
500 TABLET ORAL EVERY 12 HOURS SCHEDULED
Status: DISCONTINUED | OUTPATIENT
Start: 2023-04-03 | End: 2023-04-03 | Stop reason: HOSPADM

## 2023-04-03 RX ORDER — NALOXONE HYDROCHLORIDE 4 MG/.1ML
SPRAY NASAL
Qty: 2 EACH | Refills: 0 | Status: SHIPPED | OUTPATIENT
Start: 2023-04-03

## 2023-04-03 RX ORDER — NAPROXEN 250 MG/1
250 TABLET ORAL 2 TIMES DAILY WITH MEALS
Qty: 10 TABLET | Refills: 0 | Status: SHIPPED | OUTPATIENT
Start: 2023-04-03 | End: 2023-04-08

## 2023-04-03 RX ORDER — ALIROCUMAB 75 MG/ML
75 INJECTION, SOLUTION SUBCUTANEOUS
Qty: 2.24 ML
Start: 2023-04-03

## 2023-04-03 RX ORDER — DIAZEPAM 5 MG/1
5 TABLET ORAL EVERY 6 HOURS PRN
Qty: 28 TABLET | Refills: 0 | Status: SHIPPED | OUTPATIENT
Start: 2023-04-03 | End: 2023-04-10

## 2023-04-03 RX ORDER — LOSARTAN POTASSIUM 100 MG/1
100 TABLET ORAL
Qty: 30 TABLET | Refills: 5 | Status: SHIPPED | OUTPATIENT
Start: 2023-04-03

## 2023-04-03 RX ORDER — METRONIDAZOLE 500 MG/1
500 TABLET ORAL EVERY 12 HOURS SCHEDULED
Status: DISCONTINUED | OUTPATIENT
Start: 2023-04-03 | End: 2023-04-03 | Stop reason: HOSPADM

## 2023-04-03 RX ORDER — CEFUROXIME AXETIL 500 MG/1
500 TABLET ORAL EVERY 12 HOURS SCHEDULED
Qty: 20 TABLET | Refills: 0 | Status: SHIPPED | OUTPATIENT
Start: 2023-04-03 | End: 2023-04-13

## 2023-04-03 RX ORDER — ONDANSETRON 4 MG/1
4 TABLET, ORALLY DISINTEGRATING ORAL EVERY 6 HOURS PRN
Qty: 30 TABLET | Refills: 2 | Status: SHIPPED | OUTPATIENT
Start: 2023-04-03

## 2023-04-03 RX ORDER — ACETAMINOPHEN 500 MG
1000 TABLET ORAL 3 TIMES DAILY PRN
Start: 2023-04-03

## 2023-04-03 RX ORDER — TRAMADOL HYDROCHLORIDE 50 MG/1
100 TABLET ORAL EVERY 6 HOURS PRN
Qty: 28 TABLET | Refills: 0 | Status: SHIPPED | OUTPATIENT
Start: 2023-04-03 | End: 2023-04-10

## 2023-04-03 RX ORDER — METRONIDAZOLE 500 MG/1
500 TABLET ORAL EVERY 12 HOURS SCHEDULED
Qty: 20 TABLET | Refills: 0 | Status: SHIPPED | OUTPATIENT
Start: 2023-04-03 | End: 2023-04-13

## 2023-04-03 RX ORDER — HYDROMORPHONE HYDROCHLORIDE 2 MG/1
2 TABLET ORAL EVERY 6 HOURS PRN
Qty: 28 TABLET | Refills: 0 | Status: SHIPPED | OUTPATIENT
Start: 2023-04-03 | End: 2023-04-10

## 2023-04-03 RX ADMIN — TRAMADOL HYDROCHLORIDE 100 MG: 50 TABLET, COATED ORAL at 06:00

## 2023-04-03 RX ADMIN — DIPHENHYDRAMINE HYDROCHLORIDE 25 MG: 50 INJECTION, SOLUTION INTRAMUSCULAR; INTRAVENOUS at 04:48

## 2023-04-03 RX ADMIN — HYDROMORPHONE HYDROCHLORIDE 2 MG: 2 TABLET ORAL at 04:47

## 2023-04-03 RX ADMIN — TRAMADOL HYDROCHLORIDE 100 MG: 50 TABLET, COATED ORAL at 00:12

## 2023-04-03 RX ADMIN — FLUOXETINE 40 MG: 20 CAPSULE ORAL at 08:54

## 2023-04-03 RX ADMIN — NAPROXEN 250 MG: 250 TABLET ORAL at 09:04

## 2023-04-03 RX ADMIN — ACETAMINOPHEN 1000 MG: 500 TABLET ORAL at 02:08

## 2023-04-03 RX ADMIN — METOPROLOL TARTRATE 25 MG: 25 TABLET, FILM COATED ORAL at 08:54

## 2023-04-03 RX ADMIN — CYANOCOBALAMIN TAB 1000 MCG 1000 MCG: 1000 TAB at 08:55

## 2023-04-03 RX ADMIN — CEFUROXIME AXETIL 500 MG: 250 TABLET ORAL at 08:55

## 2023-04-03 RX ADMIN — Medication 10 ML: at 08:56

## 2023-04-03 RX ADMIN — SIMETHICONE 80 MG: 80 TABLET, CHEWABLE ORAL at 08:56

## 2023-04-03 RX ADMIN — LOSARTAN POTASSIUM 100 MG: 50 TABLET, FILM COATED ORAL at 08:54

## 2023-04-03 RX ADMIN — DIAZEPAM 5 MG: 5 TABLET ORAL at 09:04

## 2023-04-03 RX ADMIN — ACETAMINOPHEN 1000 MG: 500 TABLET ORAL at 08:58

## 2023-04-03 RX ADMIN — METRONIDAZOLE 500 MG: 500 TABLET ORAL at 08:54

## 2023-04-03 RX ADMIN — TRAMADOL HYDROCHLORIDE 100 MG: 50 TABLET, COATED ORAL at 12:15

## 2023-04-03 RX ADMIN — LEVOTHYROXINE SODIUM 150 MCG: 150 TABLET ORAL at 08:55
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
## 2023-04-03 NOTE — DISCHARGE SUMMARY
[facility] Medicine Services  DISCHARGE SUMMARY    Patient Name: Heidy Whitman  : 1970  MRN: [id number]    Date of Admission: 3/29/2023  4:16 PM  Date of Discharge: 4/3/2023  Primary Care Physician: Itzel Long MD    Consults     Date and Time Order Name Status Description    3/30/2023 12:34 AM Inpatient Colorectal Surgery Consult Completed         Hospital Course     Presenting Problem:   Diverticulitis [K57.92]    Active Hospital Problems    Diagnosis  POA   • **Recurrent diverticulitis [K57.92]  Yes   • Diarrhea of presumed infectious origin [R19.7]  Yes   • HTN (hypertension) [I10]  Yes   • HLD (hyperlipidemia) [E78.5]  Yes   • Hypothyroidism (acquired) [E03.9]  Yes   • Rheumatoid arthritis involving multiple sites [M06.9]  Yes      Resolved Hospital Problems    Diagnosis Date Resolved POA   • Norovirus [A08.11] 2023 Yes   • Hypovolemia due to dehydration [E86.1, E86.0] 2023 Yes   • Elevated serum creatinine [R79.89] 2023 Yes      Hospital Course:  Heidy Whitman is a 52 y.o. female with PMH significant for HTN, HLD, hypothyroidism, RA and recurrent diverticulitis. Recently admitted to [facility] -2023 for diverticulitis with failure of outpatient treatment. Colorectal surgery and ID followed. She discharged on IV Invanz with plans for outpatient elective colectomy (scheduled 2023). She was sent to to [facility] ED from Dr. Stanley's office (colorectal surgery) on 3/29/2023 for evaluation of abdominal pain and diarrhea x 10 days. She had tested positive for Norovirus 5 days prior.      Recurrent diverticulitis  - s/p laparoscopic LAR with ureteral stent insertion and sigmoid / proximal rectum resection by Jamilah Stanley and Tatianna on 3/30/2023  - Infectious disease following - monitoring off of antimicrobial therapy  - Patient reports allergy/intolerance to morphine, codeine, dilaudid,  oxycodone, hydrocodone etc - this has made postoperative pain control challenging   - At DC, continue Tramadol Q6H, Dilaudid 2mg Q6H PRN for breakthrough pain, PRN Valium and Naprosyn  - Some erythema at superior aspect of umbilical incision - per ID, DC on Cefuroxime 500mg BID / Metronidazole 500mg BID. Follow up with Dr. Corcoran tomorrow  - Follow up with Dr. Stanley in 2 weeks     Diarrhea, resolved   (+) norovirus  - C. Diff negative  - Conservative management     Elevated creatinine   - Baseline creatinine appears to be 0.6-0.9  - Creatinine 1.13 on admission - s/p IV fluids with resolution  - Creatinine 0.59 on day of discharge      Hypertension  Hyperlipidemia  - Continue Losartan 100mg daily and Metoprolol 25mg daily   - DC home Lasix / HCTZ until taking in PO more reliably. Risk of dehydration outweighs benefit at this time.      Elevated LFTs, chronic  - Stable     RA  - Continue home Amitriptyline  - Gets Enbrel and Praluent injections - on hold until recovered from surgery   - Follows with Dr. Adarsh Patton     Hypothyroidism  - Continue Synthroid    Day of Discharge     HPI:   Sitting in chair. Abdominal pain manageable with changes made yesterday. Denies chest pain or dyspnea. Tolerating diet without nausea or vomiting. Had a few small BMs yesterday.     Review of Systems  Gen- No fevers, chills  CV- No chest pain, palpitations  Resp- No cough, dyspnea  GI- as above     Vital Signs:   Temp:  [98.5 °F (36.9 °C)-98.7 °F (37.1 °C)] 98.5 °F (36.9 °C)  Heart Rate:  [61-74] 61  Resp:  [20] 20  BP: (118-145)/(72-86) 137/79    Physical Exam:  Constitutional: Awake, alert and conversational. Sitting up in chair  HENT: NCAT, mucous membranes moist  Respiratory: Clear to auscultation bilaterally, respiratory effort normal on room air   Cardiovascular: RRR, no murmurs, rubs, or gallops  Gastrointestinal: Positive bowel sounds, soft. Incision x 3 c/d/i - mild erythema along superior aspect of umbilical  incision  Musculoskeletal: No bilateral ankle edema  Psychiatric: Appropriate affect, cooperative  Neurologic: Oriented x 3, moves all extremities spontaneously without focal deficits, speech clear  Skin: No rashes    Pertinent  and/or Most Recent Results     LAB RESULTS:      Lab 04/03/23 0403 04/02/23 0338 04/01/23  0350 03/31/23  0332 03/30/23  0346 03/29/23  1801   WBC 4.85 5.50 6.23 8.58 4.70 6.44   HEMOGLOBIN 11.5* 11.9* 11.1* 12.2 12.6 13.7   HEMATOCRIT 36.2 37.6 35.1 36.2 38.8 42.1   PLATELETS 232 240 232 252 230 276   NEUTROS ABS 2.86 2.87 4.29 7.76* 2.77 4.37   IMMATURE GRANS (ABS) 0.01 0.03 0.02 0.04 0.01 0.02   LYMPHS ABS 1.15 1.64 1.27 0.43* 1.15 1.30   MONOS ABS 0.54 0.66 0.54 0.34 0.63 0.63   EOS ABS 0.25 0.27 0.08 0.00 0.12 0.10   MCV 92.6 94.2 93.4 88.5 91.7 90.7   PROCALCITONIN  --   --   --   --   --  0.18   LACTATE  --   --   --   --   --  0.8   PROTIME  --   --   --   --  13.2  --          Lab 04/03/23  0403 04/02/23  0337 04/01/23  0350 03/31/23  0331 03/30/23  0346   SODIUM 143 144 138 136 139   POTASSIUM 3.8 3.9 3.8 3.7 3.6   CHLORIDE 106 106 103 101 103   CO2 27.0 28.0 27.0 24.0 25.0   ANION GAP 10.0 10.0 8.0 11.0 11.0   BUN 12 12 8 13 14   CREATININE 0.59 0.69 0.78 0.57 0.79   EGFR 110.0 105.9 92.7 110.9 91.3   GLUCOSE 86 73 106* 177* 98   CALCIUM 8.2* 8.8 8.2* 8.3* 8.8   MAGNESIUM  --   --   --  1.9  --          Lab 03/29/23  1801   TOTAL PROTEIN 6.7   ALBUMIN 4.0   GLOBULIN 2.7   ALT (SGPT) 43*   AST (SGOT) 49*   BILIRUBIN 0.3   ALK PHOS 75         Lab 03/30/23  0346 03/29/23  1801   PROBNP  --  93.4   PROTIME 13.2  --    INR 1.01  --          Lab 03/29/23  2156 03/29/23  1801   ABO TYPING A A   RH TYPING Positive Positive   ANTIBODY SCREEN  --  Negative     Brief Urine Lab Results  (Last result in the past 365 days)      Color   Clarity   Blood   Leuk Est   Nitrite   Protein   CREAT   Urine HCG        02/16/23 1732 Yellow   Clear   Negative   Trace   Negative   Trace                Microbiology Results (last 10 days)     Procedure Component Value - Date/Time    Clostridioides difficile Toxin, PCR - Stool, Per Rectum [[id number]]  (Normal) Collected: 03/24/23 1255    Lab Status: Final result Specimen: Stool from Per Rectum Updated: 03/24/23 1401     C. Difficile Toxins by PCR Not Detected    Narrative:      The result indicates the absence of toxigenic C. difficile from stool specimen.         CT Abdomen Pelvis With Contrast    Result Date: 3/29/2023  CT ABDOMEN PELVIS W CONTRAST Date of Exam: 3/29/2023 8:31 PM EDT Indication: Diverticulitis, complication suspected. Comparison: 2/19/2023 Technique: Axial CT images were obtained of the abdomen and pelvis following the uneventful intravenous administration intravenous contrast. Reconstructed coronal and sagittal images were also obtained. Automated exposure control and iterative construction methods were used. Findings: Lung Bases:   The visualized lung bases and lower mediastinal structures are unremarkable. Liver: The liver appears diffusely hypodense. No focal lesions. Biliary/Gallbladder:  The gallbladder is normal without evidence of radiopaque stones. The biliary tree is nondilated. Spleen: Spleen is normal in size and CT density. Pancreas:  Pancreas is normal. There is no evidence of pancreatic mass or peripancreatic fluid. Kidneys:  Kidneys are normal in size. There are no stones or hydronephrosis. Adrenals:  Adrenal glands are unremarkable. Retroperitoneal/Lymph Nodes/Vasculature:  No retroperitoneal adenopathy is identified. Gastrointestinal/Mesentery:  The bowel loops are non-dilated without wall thickening or mass. The appendix as well-visualized. No secondary findings of appendicitis identified. Moderate stool burden present. There is mild diverticulosis of the sigmoid colon. No significant inflammatory changes identified. Single diverticula present within the descending colon (series 900 image 40). No significant inflammatory  changes.. No evidence of obstruction. No free air. No mesenteric fluid collections identified. Bladder:  The bladder is normal. Genital:   Unremarkable      Bony Structures:   Visualized bony structures are consistent with the patient's age.     Impression: 1. No acute intra-abdominal or intrapelvic process. Previously described inflammatory changes have resolved. 2. Hepatic steatosis. 3. Ancillary findings as described above. Electronically Signed: Yandy Osborn  3/29/2023 8:59 PM EDT  Workstation ID: [id number]    TAP blocks    Result Date: 3/30/2023  Giancarlo Corea CRNA     3/30/2023  2:07 PM TAP blocks Patient reassessed immediately prior to procedure Patient location during procedure: OR Reason for block: at surgeon's request and post-op pain management Performed by CRNA/CAA: Giancarlo Corea CRNA Preanesthetic Checklist Completed: patient identified, IV checked, site marked, risks and benefits discussed, surgical consent, monitors and equipment checked, pre-op evaluation and timeout performed Prep: Pt Position: supine Sterile barriers:cap, gloves, mask and washed/disinfected hands Prep: ChloraPrep Patient monitoring: blood pressure monitoring, continuous pulse oximetry and EKG Procedure Sedation: yes Performed under: general Guidance:ultrasound guided Images:still images obtained, printed/placed on chart Laterality:Bilateral Block Type:TAP Injection Technique:single-shot Needle Type:short-bevel and echogenic Needle Gauge:20 G Resistance on Injection: none Medications Used: bupivacaine PF (MARCAINE) 0.25 % injection - Injection  60 mL - 3/30/2023 2:05:00 PM dexamethasone sodium phosphate injection - Injection  2 mg - 3/30/2023 2:05:00 PM Medications Comment:Block Injection:  LA dose divided between Right and Left block Post Assessment Injection Assessment: negative aspiration for heme, incremental injection and no paresthesia on injection Patient Tolerance:comfortable throughout block Complications:no Additional  "Notes Mid-Axillary/Lateral TAPs A high-frequency linear transducer, with sterile cover, was placed in the midaxillary line between the ASIS and costal margin. The External Oblique Muscle (EOM), Internal Oblique Muscle (IOM), Transverse Abdominus Muscle (CAGE), and Peritoneum were identified. The insertion site was prepped in sterile fashion and then localized with 2-5 ml of 1% Lidocaine. Using ultrasound-guidance, a 20-gauge B-Johnson 4\" Ultraplex 360 non-stimulating echogenic needle was advanced in plane, from medial to lateral, until the tip of the needle was in the fascial plane between the IOM and CAGE. 1-3ml of preservative free normal saline was used to hydro-dissect the fascial planes. After the fascial plane was verified, the local anesthetic (LA) was injected. The procedure was repeated on the opposite side for bilateral coverage. Aspiration every 5 ml to prevent intravascular injection. Injection was completed with negative aspiration of blood and negative intravascular injection. Injection pressures were normal with minimal resistance. Midaxillary TAPs should reach intercostal nerves T10- T11 and the subcostal nerve T12.      Results for orders placed during the hospital encounter of 02/16/23    Adult Transthoracic Echo Complete w/ Color, Spectral and Contrast if necessary per protocol    Interpretation Summary  •  Left ventricular ejection fraction appears to be 66 - 70%.  •  Estimated right ventricular systolic pressure from tricuspid regurgitation is normal (<35 mmHg).    Discharge Details        Discharge Medications      New Medications      Instructions Start Date   acetaminophen 500 MG tablet  Commonly known as: TYLENOL   1,000 mg, Oral, 3 Times Daily PRN      cefuroxime 500 MG tablet  Commonly known as: CEFTIN   500 mg, Oral, Every 12 Hours Scheduled      diazePAM 5 MG tablet  Commonly known as: VALIUM   5 mg, Oral, Every 6 Hours PRN      HYDROmorphone 2 MG tablet  Commonly known as: DILAUDID   2 mg, " Oral, Every 6 Hours PRN      losartan 100 MG tablet  Commonly known as: COZAAR  Replaces: losartan-hydrochlorothiazide 100-25 MG per tablet   100 mg, Oral, Every 24 Hours Scheduled      metroNIDAZOLE 500 MG tablet  Commonly known as: FLAGYL   500 mg, Oral, Every 12 Hours Scheduled      naloxone 4 MG/0.1ML nasal spray  Commonly known as: NARCAN   CALL 911. Do not prime. Spray into nostril upon signs of opioid overdose. May repeat in 2 to 3 minutes in opposite nostril if no or minimal breathing and responsiveness, then as needed (if doses are available) every 2 to 3 minutes.      naproxen 250 MG tablet  Commonly known as: NAPROSYN   250 mg, Oral, 2 Times Daily With Meals      traMADol 50 MG tablet  Commonly known as: ULTRAM   100 mg, Oral, Every 6 Hours PRN         Changes to Medications      Instructions Start Date   Praluent 75 MG/ML solution auto-injector  Generic drug: Alirocumab  What changed: additional instructions   75 mg, Subcutaneous, Every 14 Days, Every other Tuesday - HOLD UNTIL CLEARED TO RESUME BY YOUR SURGEON         Continue These Medications      Instructions Start Date   amitriptyline 10 MG tablet  Commonly known as: ELAVIL   10 mg, Oral, Nightly      CLARITIN-D 24 HOUR PO   Oral, Daily      FLUoxetine 40 MG capsule  Commonly known as: PROzac   40 mg, Oral, Daily      levothyroxine 150 MCG tablet  Commonly known as: SYNTHROID, LEVOTHROID   150 mcg, Oral, Daily      metoprolol tartrate 25 MG tablet  Commonly known as: LOPRESSOR   25 mg, Oral, Every Morning      ondansetron ODT 4 MG disintegrating tablet  Commonly known as: ZOFRAN-ODT   4 mg, Translingual, Every 6 Hours PRN      TiZANidine 4 MG capsule  Commonly known as: ZANAFLEX   4 mg, Oral, Nightly      Vitamin B-12 5000 MCG tablet dispersible   5,000 mcg, Oral, Daily         Stop These Medications    furosemide 20 MG tablet  Commonly known as: LASIX     losartan-hydrochlorothiazide 100-25 MG per tablet  Commonly known as: HYZAAR  Replaced by:  losartan 100 MG tablet     potassium chloride 20 MEQ tablet controlled-release ER tablet  Commonly known as: K-DUR,KLOR-CON          Allergies   Allergen Reactions   • Gastrografin [Diatrizoate] Anaphylaxis   • Green Dyes Diarrhea   • Guaifenesin & Derivatives    • Lactose Intolerance (Gi) Unknown - High Severity   • Gabapentin Hives and Rash   • Pregabalin Hives and Rash     Discharge Disposition: Discharge home     Diet:  Diet Order   Procedures   • Diet: Gastrointestinal Diets; Fiber-Restricted; Texture: Regular Texture (IDDSI 7); Fluid Consistency: Thin (IDDSI 0)     Activity:  Activity Instructions     Other Activity Instructions      No lifting more than 5-10 lbs until cleared by your surgeon (usually 4-6 weeks)   You may shower normally. Do not scrub or soak your incisions   Cover your incisions as needed for drainage. Do not leave covered at all times - leave open to air when able   Contact your surgeon if you develop increased pain, redness, swelling or purulent/foul-smelling discharge        CODE STATUS:    Code Status and Medical Interventions:   Ordered at: 03/29/23 2036     Level Of Support Discussed With:    Patient     Code Status (Patient has no pulse and is not breathing):    CPR (Attempt to Resuscitate)     Medical Interventions (Patient has pulse or is breathing):    Full Support     No future appointments.    Additional Instructions for the Follow-ups that You Need to Schedule     Discharge Follow-up with PCP   As directed       Currently Documented PCP:    Itzel Long MD    PCP Phone Number:    [phone]     Follow Up Details: 1 week         Discharge Follow-up with Specified Provider: Lizeth in 2 weeks   As directed      To: Lizeth in 2 weeks             Phoebe Cardoso PA-C  04/03/23    Time Spent on Discharge:  I spent 35 minutes on this discharge activity which included: face-to-face encounter with the patient, reviewing the data in the system, coordination of the care  with the nursing staff as well as consultants, documentation, and entering orders.

## 2023-04-03 NOTE — CASE MANAGEMENT/SOCIAL WORK
Case Management Discharge Note      Final Note: Patient to discharge today. Home with spouse         Selected Continued Care - Admitted Since 3/29/2023     Destination    No services have been selected for the patient.              Durable Medical Equipment    No services have been selected for the patient.              Dialysis/Infusion    No services have been selected for the patient.              Home Medical Care    No services have been selected for the patient.              Therapy    No services have been selected for the patient.              Community Resources    No services have been selected for the patient.              Community & DME    No services have been selected for the patient.                Selected Continued Care - Prior Encounters Includes continued care and service providers with selected services from prior encounters from 12/29/2022 to 4/3/2023    Discharged on 2/21/2023 Admission date: 2/16/2023 - Discharge disposition: Home or Self Care    Dialysis/Infusion     Service Provider Selected Services Address Phone Fax Patient Preferred    [facility] [address] [place] [address] [address], [facility] [address] [phone] --                    Transportation Services  Private: Car    Final Discharge Disposition Code: 01 - home or self-care

## 2023-04-03 NOTE — PROGRESS NOTES
Patient is doing well overnight, had another bowel movement, no more bleeding.  Pain is well controlled.    Afebrile, vital signs within normal limits  Soft, appropriately tender, incisions are clean dry and intact    Plan  At this point, I think she is clear to discharge from my perspective  I will call in pain medication for her  I gave her warning signs and symptoms of when she should return to the hospital  I will see her in 2 weeks in the office

## 2023-04-04 ENCOUNTER — LAB (OUTPATIENT)
Dept: LAB | Facility: HOSPITAL | Age: 53
End: 2023-04-04
Payer: COMMERCIAL

## 2023-04-04 ENCOUNTER — TRANSCRIBE ORDERS (OUTPATIENT)
Dept: LAB | Facility: HOSPITAL | Age: 53
End: 2023-04-04
Payer: COMMERCIAL

## 2023-04-04 DIAGNOSIS — R74.01 ELEVATED LEVELS OF TRANSAMINASE & LACTIC ACID DEHYDROGENASE: ICD-10-CM

## 2023-04-04 DIAGNOSIS — R19.7 DIARRHEA OF PRESUMED INFECTIOUS ORIGIN: ICD-10-CM

## 2023-04-04 DIAGNOSIS — K57.32 DIVERTICULITIS OF SIGMOID COLON: ICD-10-CM

## 2023-04-04 DIAGNOSIS — M05.79 RHEUMATOID ARTHRITIS INVOLVING MULTIPLE SITES WITH POSITIVE RHEUMATOID FACTOR: ICD-10-CM

## 2023-04-04 DIAGNOSIS — R74.02 ELEVATED LEVELS OF TRANSAMINASE & LACTIC ACID DEHYDROGENASE: ICD-10-CM

## 2023-04-04 DIAGNOSIS — R19.7 DIARRHEA OF PRESUMED INFECTIOUS ORIGIN: Primary | ICD-10-CM

## 2023-04-04 LAB
ALBUMIN SERPL-MCNC: 3.6 G/DL (ref 3.5–5.2)
ALBUMIN/GLOB SERPL: 1.3 G/DL
ALP SERPL-CCNC: 74 U/L (ref 39–117)
ALT SERPL W P-5'-P-CCNC: 29 U/L (ref 1–33)
ANION GAP SERPL CALCULATED.3IONS-SCNC: 10 MMOL/L (ref 5–15)
AST SERPL-CCNC: 36 U/L (ref 1–32)
BASOPHILS # BLD AUTO: 0.03 10*3/MM3 (ref 0–0.2)
BASOPHILS NFR BLD AUTO: 0.6 % (ref 0–1.5)
BILIRUB SERPL-MCNC: 0.4 MG/DL (ref 0–1.2)
BUN SERPL-MCNC: 8 MG/DL (ref 6–20)
BUN/CREAT SERPL: 11 (ref 7–25)
CALCIUM SPEC-SCNC: 8.9 MG/DL (ref 8.6–10.5)
CHLORIDE SERPL-SCNC: 102 MMOL/L (ref 98–107)
CO2 SERPL-SCNC: 26 MMOL/L (ref 22–29)
CREAT SERPL-MCNC: 0.73 MG/DL (ref 0.57–1)
CRP SERPL-MCNC: 2.36 MG/DL (ref 0–0.5)
DEPRECATED RDW RBC AUTO: 40.6 FL (ref 37–54)
EGFRCR SERPLBLD CKD-EPI 2021: 100.3 ML/MIN/1.73
EOSINOPHIL # BLD AUTO: 0.24 10*3/MM3 (ref 0–0.4)
EOSINOPHIL NFR BLD AUTO: 4.5 % (ref 0.3–6.2)
ERYTHROCYTE [DISTWIDTH] IN BLOOD BY AUTOMATED COUNT: 12.5 % (ref 12.3–15.4)
ERYTHROCYTE [SEDIMENTATION RATE] IN BLOOD: 41 MM/HR (ref 0–30)
GLOBULIN UR ELPH-MCNC: 2.8 GM/DL
GLUCOSE SERPL-MCNC: 83 MG/DL (ref 65–99)
HCT VFR BLD AUTO: 38.6 % (ref 34–46.6)
HGB BLD-MCNC: 12.8 G/DL (ref 12–15.9)
IMM GRANULOCYTES # BLD AUTO: 0.02 10*3/MM3 (ref 0–0.05)
IMM GRANULOCYTES NFR BLD AUTO: 0.4 % (ref 0–0.5)
LYMPHOCYTES # BLD AUTO: 0.85 10*3/MM3 (ref 0.7–3.1)
LYMPHOCYTES NFR BLD AUTO: 15.9 % (ref 19.6–45.3)
MCH RBC QN AUTO: 29.9 PG (ref 26.6–33)
MCHC RBC AUTO-ENTMCNC: 33.2 G/DL (ref 31.5–35.7)
MCV RBC AUTO: 90.2 FL (ref 79–97)
MONOCYTES # BLD AUTO: 0.56 10*3/MM3 (ref 0.1–0.9)
MONOCYTES NFR BLD AUTO: 10.5 % (ref 5–12)
NEUTROPHILS NFR BLD AUTO: 3.64 10*3/MM3 (ref 1.7–7)
NEUTROPHILS NFR BLD AUTO: 68.1 % (ref 42.7–76)
NRBC BLD AUTO-RTO: 0 /100 WBC (ref 0–0.2)
PLATELET # BLD AUTO: 298 10*3/MM3 (ref 140–450)
PMV BLD AUTO: 9.9 FL (ref 6–12)
POTASSIUM SERPL-SCNC: 3.9 MMOL/L (ref 3.5–5.2)
PROT SERPL-MCNC: 6.4 G/DL (ref 6–8.5)
RBC # BLD AUTO: 4.28 10*6/MM3 (ref 3.77–5.28)
SODIUM SERPL-SCNC: 138 MMOL/L (ref 136–145)
WBC NRBC COR # BLD: 5.34 10*3/MM3 (ref 3.4–10.8)

## 2023-04-04 PROCEDURE — 36415 COLL VENOUS BLD VENIPUNCTURE: CPT

## 2023-04-04 PROCEDURE — 86140 C-REACTIVE PROTEIN: CPT

## 2023-04-04 PROCEDURE — 80053 COMPREHEN METABOLIC PANEL: CPT

## 2023-04-04 PROCEDURE — 85025 COMPLETE CBC W/AUTO DIFF WBC: CPT

## 2023-04-04 PROCEDURE — 85652 RBC SED RATE AUTOMATED: CPT

## 2023-04-04 NOTE — OUTREACH NOTE
Prep Survey    Flowsheet Row Responses   [facility] patient discharged from? [place]   Is LACE score < 7 ? No   Eligibility Readm Mgmt   Discharge diagnosis Lap resection    Does the patient have one of the following disease processes/diagnoses(primary or secondary)? General Surgery   Does the patient have Home health ordered? No   Is there a DME ordered? No   Prep survey completed? Yes          ANDREA EMERY - Registered Nurse

## 2023-04-11 ENCOUNTER — READMISSION MANAGEMENT (OUTPATIENT)
Dept: CALL CENTER | Facility: HOSPITAL | Age: 53
End: 2023-04-11
Payer: COMMERCIAL

## 2023-04-11 NOTE — OUTREACH NOTE
General Surgery Week 2 Survey    Flowsheet Row Responses   [facility] patient discharged from? [place]   Does the patient have one of the following disease processes/diagnoses(primary or secondary)? General Surgery   Week 2 attempt successful? Yes   Call start time 1128   Call end time 1139   Discharge diagnosis Lap resection    Person spoke with today (if not patient) and relationship pt   Medication alerts for this patient IV antibiotics d/c.   Meds reviewed with patient/caregiver? Yes   Is the patient having any side effects they believe may be caused by any medication additions or changes? No   Does the patient have all medications related to this admission filled (includes all antibiotics, pain medications, etc.) Yes   Is the patient taking all medications as directed (includes completed medication regime)? Yes   Does the patient have a follow up appointment scheduled with their surgeon? Yes   Has the patient kept scheduled appointments due by today? N/A   Comments Pt has appt with infecious dx today, 4/11/23   Psychosocial issues? No   What is the patient's perception of their health status since discharge? Improving   Nursing interventions Nurse provided patient education   Is the patient /caregiver able to teach back basic post-op care? Take showers only when approved by MD-sponge bathe until then, No tub bath, swimming, or hot tub until instructed by MD, Keep incision areas clean,dry and protected, Lifting as instructed by MD in discharge instructions   Is the patient/caregiver able to teach back signs and symptoms of incisional infection? Increased redness, swelling or pain at the incisonal site, Increased drainage or bleeding, Incisional warmth, Pus or odor from incision, Fever   Is the patient/caregiver able to teach back steps to recovery at home? Rest and rebuild strength, gradually increase activity, Eat a well-balance diet   If the patient is a current smoker, are they able to teach back  resources for cessation? Not a smoker   Is the patient/caregiver able to teach back the hierarchy of who to call/visit for symptoms/problems? PCP, Specialist, Home health nurse, Urgent Care, ED, 911 Yes   Week 2 call completed? Yes   Is the patient interested in additional calls from an ambulatory ?  NOTE:  applies to high risk patients requiring additional follow-up. No   Wrap up additional comments Pt states she is doing better, and reports soft BMs. Reviewed AVS/medications with pt. Pt has appt today with infectious dx, and has an upcoming post-op appt.          Ciara VALLECILLO - Registered Nurse

## 2023-04-19 ENCOUNTER — READMISSION MANAGEMENT (OUTPATIENT)
Dept: CALL CENTER | Facility: HOSPITAL | Age: 53
End: 2023-04-19
Payer: COMMERCIAL

## 2023-04-19 NOTE — OUTREACH NOTE
General Surgery Week 3 Survey    Flowsheet Row Responses   [facility] patient discharged from? [place]   Does the patient have one of the following disease processes/diagnoses(primary or secondary)? General Surgery   Week 3 attempt successful? Yes   Call start time 1043   Call end time 1046   Discharge diagnosis Lap resection    Is the patient taking all medications as directed (includes completed medication regime)? Yes   Does the patient have a follow up appointment scheduled with their surgeon? Yes   Has the patient kept scheduled appointments due by today? Yes   Psychosocial issues? No   What is the patient's perception of their health status since discharge? Improving   Is the patient/caregiver able to teach back signs and symptoms of incisional infection? Increased redness, swelling or pain at the incisonal site, Increased drainage or bleeding, Incisional warmth, Pus or odor from incision, Fever   If the patient is a current smoker, are they able to teach back resources for cessation? Not a smoker   Is the patient/caregiver able to teach back the hierarchy of who to call/visit for symptoms/problems? PCP, Specialist, Home health nurse, Urgent Care, ED, 911 Yes   Additional teach back comments States she is doing well.  Still having some pain but surgeon told her this is normal.  She saw him yesterday and everything is looking good and happy with her progress.  Dr. Sun release her from his care.     Week 3 call completed? Yes   Graduated/Revoked comments Denies questions or needs at this time.          Gregoria MEJIA - Licensed Nurse

## 2023-10-06 ENCOUNTER — TRANSCRIBE ORDERS (OUTPATIENT)
Dept: ADMINISTRATIVE | Facility: HOSPITAL | Age: 53
End: 2023-10-06
Payer: COMMERCIAL

## 2023-10-06 DIAGNOSIS — Z12.31 SCREENING MAMMOGRAM FOR BREAST CANCER: Primary | ICD-10-CM

## 2023-11-11 ENCOUNTER — HOSPITAL ENCOUNTER (OUTPATIENT)
Dept: MAMMOGRAPHY | Facility: HOSPITAL | Age: 53
Discharge: HOME | End: 2023-11-11
Admitting: FAMILY MEDICINE
Payer: COMMERCIAL

## 2023-11-11 DIAGNOSIS — Z12.31 SCREENING MAMMOGRAM FOR BREAST CANCER: ICD-10-CM

## 2023-11-11 PROCEDURE — 77067 SCR MAMMO BI INCL CAD: CPT

## 2023-11-11 PROCEDURE — 77063 BREAST TOMOSYNTHESIS BI: CPT

## 2024-01-31 ENCOUNTER — HOSPITAL ENCOUNTER (OUTPATIENT)
Dept: GENERAL RADIOLOGY | Facility: HOSPITAL | Age: 54
Discharge: HOME | End: 2024-01-31
Admitting: NURSE PRACTITIONER
Payer: COMMERCIAL

## 2024-01-31 ENCOUNTER — TRANSCRIBE ORDERS (OUTPATIENT)
Dept: GENERAL RADIOLOGY | Facility: HOSPITAL | Age: 54
End: 2024-01-31
Payer: COMMERCIAL

## 2024-01-31 DIAGNOSIS — R60.9 PERIPHERAL EDEMA: Primary | ICD-10-CM

## 2024-01-31 DIAGNOSIS — R60.9 PERIPHERAL EDEMA: ICD-10-CM

## 2024-01-31 PROCEDURE — 71046 X-RAY EXAM CHEST 2 VIEWS: CPT

## 2024-09-03 ENCOUNTER — TRANSCRIBE ORDERS (OUTPATIENT)
Dept: ADMINISTRATIVE | Facility: HOSPITAL | Age: 54
End: 2024-09-03
Payer: COMMERCIAL

## 2024-09-03 DIAGNOSIS — N28.9 RENAL LESION: Primary | ICD-10-CM

## 2024-09-08 ENCOUNTER — HOSPITAL ENCOUNTER (OUTPATIENT)
Facility: HOSPITAL | Age: 54
Discharge: HOME | End: 2024-09-08
Admitting: INTERNAL MEDICINE
Payer: COMMERCIAL

## 2024-09-08 DIAGNOSIS — N28.9 RENAL LESION: ICD-10-CM

## 2024-09-08 LAB — CREAT BLDA-MCNC: 0.9 MG/DL (ref 0.6–1.3)

## 2024-09-08 PROCEDURE — 74160 CT ABDOMEN W/CONTRAST: CPT

## 2024-09-08 PROCEDURE — 25510000001 IOPAMIDOL PER 1 ML: Performed by: INTERNAL MEDICINE

## 2024-09-08 PROCEDURE — 82565 ASSAY OF CREATININE: CPT

## 2024-09-08 RX ORDER — IOPAMIDOL 755 MG/ML
100 INJECTION, SOLUTION INTRAVASCULAR
Status: COMPLETED | OUTPATIENT
Start: 2024-09-08 | End: 2024-09-08

## 2024-09-08 RX ADMIN — IOPAMIDOL 100 ML: 755 INJECTION, SOLUTION INTRAVENOUS at 08:18

## 2024-10-18 ENCOUNTER — PATIENT ROUNDING (BHMG ONLY) (OUTPATIENT)
Dept: URGENT CARE | Facility: CLINIC | Age: 54
End: 2024-10-18
Payer: COMMERCIAL

## 2024-10-18 NOTE — ED NOTES
Thank you for letting us care for you in your recent visit to our urgent care center. We would love to hear about your experience with us. Was this the first time you have visited our location?    We’re always looking for ways to make our patients’ experiences even better. Do you have any recommendations on ways we may improve?     I appreciate you taking the time to respond. Please be on the lookout for a survey about your recent visit from [employer] via text or email. We would greatly appreciate if you could fill that out and turn it back in. We want your voice to be heard and we value your feedback.   Thank you for choosing [facility] for your healthcare needs.

## 2025-01-20 ENCOUNTER — TRANSCRIBE ORDERS (OUTPATIENT)
Dept: ADMINISTRATIVE | Facility: HOSPITAL | Age: 55
End: 2025-01-20
Payer: COMMERCIAL

## 2025-01-20 DIAGNOSIS — Z12.31 VISIT FOR SCREENING MAMMOGRAM: Primary | ICD-10-CM

## 2025-01-31 LAB
NCCN CRITERIA FLAG: ABNORMAL
TYRER CUZICK SCORE: 7.1

## 2025-01-31 NOTE — PROGRESS NOTES
This patient recently completed the CARE risk assessment for a mammogram appointment. Based on the patient's responses, NCCN criteria for genetic testing was met.     Navigator follow-up:   The patient would like to proceed with genetic testing.  I will submit an order for approval from her provider and coordinate care.

## 2025-02-27 DIAGNOSIS — Z13.79 GENETIC TESTING: Primary | ICD-10-CM

## 2025-04-04 ENCOUNTER — PATIENT ROUNDING (BHMG ONLY) (OUTPATIENT)
Dept: URGENT CARE | Facility: CLINIC | Age: 55
End: 2025-04-04
Payer: COMMERCIAL

## 2025-04-04 NOTE — ED NOTES
Thank you for letting us care for you in your recent visit to our urgent care center. We would love to hear about your experience with us. Was this the first time you have visited our location?    We’re always looking for ways to make our patients’ experiences even better. Do you have any recommendations on ways we may improve?     I appreciate you taking the time to respond. Please be on the lookout for a survey about your recent visit from [employer] via text or email. We would greatly appreciate if you could fill that out and turn it back in. We want your voice to be heard and we value your [email or web address]   Thank you for choosing [facility] for your healthcare needs.

## 2025-04-23 ENCOUNTER — HOSPITAL ENCOUNTER (OUTPATIENT)
Dept: GENERAL RADIOLOGY | Facility: HOSPITAL | Age: 55
Discharge: HOME | End: 2025-04-23
Admitting: NURSE PRACTITIONER
Payer: COMMERCIAL

## 2025-04-23 ENCOUNTER — TRANSCRIBE ORDERS (OUTPATIENT)
Dept: GENERAL RADIOLOGY | Facility: HOSPITAL | Age: 55
End: 2025-04-23
Payer: COMMERCIAL

## 2025-04-23 DIAGNOSIS — J20.9 ACUTE BRONCHITIS, UNSPECIFIED ORGANISM: ICD-10-CM

## 2025-04-23 DIAGNOSIS — J20.9 ACUTE BRONCHITIS, UNSPECIFIED ORGANISM: Primary | ICD-10-CM

## 2025-04-23 PROCEDURE — 71046 X-RAY EXAM CHEST 2 VIEWS: CPT

## 2025-07-29 ENCOUNTER — HOSPITAL ENCOUNTER (OUTPATIENT)
Dept: MAMMOGRAPHY | Facility: HOSPITAL | Age: 55
Discharge: HOME | End: 2025-07-29
Admitting: FAMILY MEDICINE
Payer: COMMERCIAL

## 2025-07-29 DIAGNOSIS — Z12.31 VISIT FOR SCREENING MAMMOGRAM: ICD-10-CM

## 2025-07-29 PROCEDURE — 77063 BREAST TOMOSYNTHESIS BI: CPT

## 2025-07-29 PROCEDURE — 77067 SCR MAMMO BI INCL CAD: CPT

## (undated) DEVICE — PK LAP LASR CHOLE 10

## (undated) DEVICE — ENDOPATH XCEL BLADELESS TROCARS WITH STABILITY SLEEVES: Brand: ENDOPATH XCEL

## (undated) DEVICE — SUT PDS 1 CTX 36IN VIO PDP371T

## (undated) DEVICE — SUT PROLN 2/0 PC3 8833H

## (undated) DEVICE — ENDOPATH XCEL UNIVERSAL TROCAR STABLILITY SLEEVES: Brand: ENDOPATH XCEL

## (undated) DEVICE — PREMIUM DRY TRAY LF: Brand: MEDLINE INDUSTRIES, INC.

## (undated) DEVICE — SUT PDS 3/0 SH 27IN CLR PDP416H

## (undated) DEVICE — DRAPE,UNDERBUTTOCKS,PCH,STERILE: Brand: MEDLINE

## (undated) DEVICE — MARYLAND JAW LAPAROSCOPIC SEALER/DIVIDER COATED: Brand: LIGASURE

## (undated) DEVICE — GLV SURG BIOGEL LTX PF 7 1/2

## (undated) DEVICE — SUT VIC 4/0 PC3 18IN UD VCP845G

## (undated) DEVICE — LAPAROSCOPY WOUND CLOSURE SYSTEM KIT CONSISTS OF: 05391529640002; NEOCLOSE ANCHORGUIDE 5/12 & 8/15 US; MODEL NUMBER NCA5/15-U; QTY 10 AND 05391529640057; NEOCLOSE AUTOANCHOR X4 PACK US; MODEL NUMBER NCA4-U; QTY 10.: Brand: NEOCLOSE ANCHORGUIDE REGULAR PORT CLOSURE KIT X2 US

## (undated) DEVICE — ADHS SKIN PREMIERPRO EXOFIN TOPICAL HI/VISC .5ML

## (undated) DEVICE — LEGGINGS, PAIR, 29X43, STERILE: Brand: MEDLINE

## (undated) DEVICE — GLV SURG SENSICARE PI MIC PF SZ7.5 LF STRL

## (undated) DEVICE — ACCESS PLATFORM FOR MINIMALLY INVASIVE SURGERY.: Brand: GELPORT® LAPAROSCOPIC  SYSTEM

## (undated) DEVICE — ECHELON FLEX POWERED PLUS LONG ARTICULATING ENDOSCOPIC LINEAR CUTTER, 60MM: Brand: ECHELON FLEX

## (undated) DEVICE — APPL CHLORAPREP 26ML CLR

## (undated) DEVICE — GW ZIPWIRE STD/SHFT STR JB/8CM .035X150CM

## (undated) DEVICE — ENDOCUT SCISSOR TIP, DISPOSABLE: Brand: RENEW

## (undated) DEVICE — PK CYSTO-TUR BASIC 10

## (undated) DEVICE — BLANKT WARM UPPR/BDY ARM/OUT 57X196CM

## (undated) DEVICE — CATH URETRL FLXITP POLLACK STD 5F 70CM

## (undated) DEVICE — TBG PENCL TELESCP MEGADYNE SMOKE EVAC 10FT

## (undated) DEVICE — UNDYED BRAIDED (POLYGLACTIN 910), SYNTHETIC ABSORBABLE SUTURE: Brand: COATED VICRYL

## (undated) DEVICE — Y-TYPE TUR/BLADDER IRRIGATION SET, REGULATING CLAMP

## (undated) DEVICE — SUT VIC 2/0 SH CR8 27IN VCP785D

## (undated) DEVICE — ANTIBACTERIAL VIOLET BRAIDED (POLYGLACTIN 910), SYNTHETIC ABSORBABLE SUTURE: Brand: COATED VICRYL

## (undated) DEVICE — PCH INST SURG INVISISHIELD 2PCKT

## (undated) DEVICE — SPNG LAP PREWSH SFTPK 18X18IN STRL PK/5

## (undated) DEVICE — LAPAROVUE VISIBILITY SYSTEM LAPAROSCOPIC SOLUTIONS: Brand: LAPAROVUE